# Patient Record
Sex: FEMALE | Race: WHITE | NOT HISPANIC OR LATINO | Employment: FULL TIME | URBAN - METROPOLITAN AREA
[De-identification: names, ages, dates, MRNs, and addresses within clinical notes are randomized per-mention and may not be internally consistent; named-entity substitution may affect disease eponyms.]

---

## 2017-04-12 ENCOUNTER — ALLSCRIPTS OFFICE VISIT (OUTPATIENT)
Dept: OTHER | Facility: OTHER | Age: 37
End: 2017-04-12

## 2017-04-12 DIAGNOSIS — Z20.828 CONTACT WITH AND (SUSPECTED) EXPOSURE TO OTHER VIRAL COMMUNICABLE DISEASES: ICD-10-CM

## 2017-04-13 ENCOUNTER — LAB CONVERSION - ENCOUNTER (OUTPATIENT)
Dept: OTHER | Facility: OTHER | Age: 37
End: 2017-04-13

## 2017-04-13 LAB
HEPATITIS B CORE TOTAL ANTIBODY (HISTORICAL): NORMAL
HEPATITIS B SURFACE ANTIGEN (HISTORICAL): NORMAL
HEPATITIS C ANTIBODY (HISTORICAL): NORMAL
HERPES SIMPLEX VIRUS 1 IGG (HISTORICAL): <0.9 INDEX
HERPES SIMPLEX VIRUS 2 IGG (HISTORICAL): <0.9 INDEX
HIV AG/AB, 4TH GEN (HISTORICAL): NORMAL
RPR SCREEN (HISTORICAL): NORMAL
SIGNAL TO CUT-OFF (HISTORICAL): 0.01

## 2017-04-17 LAB — CLINICAL COMMENT (HISTORICAL): NORMAL

## 2017-04-18 ENCOUNTER — GENERIC CONVERSION - ENCOUNTER (OUTPATIENT)
Dept: OTHER | Facility: OTHER | Age: 37
End: 2017-04-18

## 2017-05-01 LAB
ADDITIONAL INFORMATION (HISTORICAL): NORMAL
INTERPRETATION (HISTORICAL): NORMAL
INTERPRETATION (HISTORICAL): NORMAL
Lab: NEGATIVE
Lab: NORMAL

## 2018-01-13 VITALS
SYSTOLIC BLOOD PRESSURE: 114 MMHG | BODY MASS INDEX: 30.66 KG/M2 | WEIGHT: 184 LBS | HEIGHT: 65 IN | DIASTOLIC BLOOD PRESSURE: 76 MMHG

## 2018-01-15 NOTE — RESULT NOTES
Verified Results  (Q) THINPREP TIS PAP AND HR HPV DNA, C  TRACHOMATIS AND N  GONORRHOEAE [id number] 12:00AM Arlena Aschoff     Test Name Result Flag Reference   CLINICAL INFORMATION:      P3 FOR SCREENING   LMP:      [id number]   PREV  PAP:      6 YRS AGO WNL PER PT   PREV  BX:      NONE GIVEN   SOURCE:      Cervix, Endocervix   STATEMENT OF ADEQUACY:      Satisfactory for evaluation  Endocervical/transformation zone component  present  INTERPRETATION/RESULT:      Negative for intraepithelial lesion or malignancy  COMMENT:      This Pap test has been evaluated with computer  assisted technology  CYTOTECHNOLOGIST:      YAMILE SEAY(ASCP)  CT screening location: [address] [place] [address]   HPV mRNA E6/E7 Not Detected  Not Detected   This test was performed using the APTIMA HPV Assay ([employer] )  This assay detects E6/E7 viral messenger RNA (mRNA) from 14  high-risk HPV types (16,18,31,33,35,39,45,51,52,56,58,59,66,68)  CHLAMYDIA TRACHOMATIS$RNA, TMA NOT DETECTED  NOT DETECTED   NEISSERIA GONORRHOEAE$RNA, TMA NOT DETECTED  NOT DETECTED   This test was performed using the APTIMA COMBO2 Assay  ([employer] )  The analytical performance characteristics of this   assay, when used to test [facility] specimens have  been determined by [facility]

## 2018-10-25 ENCOUNTER — OFFICE VISIT (OUTPATIENT)
Dept: FAMILY MEDICINE CLINIC | Facility: CLINIC | Age: 38
End: 2018-10-25
Payer: COMMERCIAL

## 2018-10-25 ENCOUNTER — TELEPHONE (OUTPATIENT)
Dept: NEUROLOGY | Facility: CLINIC | Age: 38
End: 2018-10-25

## 2018-10-25 ENCOUNTER — APPOINTMENT (OUTPATIENT)
Dept: LAB | Facility: CLINIC | Age: 38
End: 2018-10-25
Payer: COMMERCIAL

## 2018-10-25 VITALS
HEIGHT: 66 IN | WEIGHT: 204.4 LBS | DIASTOLIC BLOOD PRESSURE: 62 MMHG | HEART RATE: 68 BPM | BODY MASS INDEX: 32.85 KG/M2 | SYSTOLIC BLOOD PRESSURE: 118 MMHG

## 2018-10-25 DIAGNOSIS — F41.9 ANXIETY AND DEPRESSION: ICD-10-CM

## 2018-10-25 DIAGNOSIS — E55.9 VITAMIN D DEFICIENCY: ICD-10-CM

## 2018-10-25 DIAGNOSIS — G43.011 INTRACTABLE MIGRAINE WITHOUT AURA AND WITH STATUS MIGRAINOSUS: Primary | ICD-10-CM

## 2018-10-25 DIAGNOSIS — F32.A ANXIETY AND DEPRESSION: ICD-10-CM

## 2018-10-25 PROBLEM — E58 DIETARY CALCIUM DEFICIENCY: Status: ACTIVE | Noted: 2017-04-12

## 2018-10-25 LAB
25(OH)D3 SERPL-MCNC: 18.3 NG/ML (ref 30–100)
MAGNESIUM SERPL-MCNC: 2.4 MG/DL (ref 1.6–2.6)

## 2018-10-25 PROCEDURE — 83735 ASSAY OF MAGNESIUM: CPT

## 2018-10-25 PROCEDURE — 82306 VITAMIN D 25 HYDROXY: CPT

## 2018-10-25 PROCEDURE — 36415 COLL VENOUS BLD VENIPUNCTURE: CPT

## 2018-10-25 PROCEDURE — 99204 OFFICE O/P NEW MOD 45 MIN: CPT | Performed by: PHYSICIAN ASSISTANT

## 2018-10-25 RX ORDER — DULOXETIN HYDROCHLORIDE 20 MG/1
40 CAPSULE, DELAYED RELEASE ORAL DAILY
COMMUNITY
Start: 2018-10-04 | End: 2019-07-03

## 2018-10-25 RX ORDER — LORAZEPAM 1 MG/1
TABLET ORAL
COMMUNITY
End: 2019-12-30

## 2018-10-25 RX ORDER — CHOLECALCIFEROL (VITAMIN D3) 125 MCG
CAPSULE ORAL
COMMUNITY

## 2018-10-25 RX ORDER — ONDANSETRON 4 MG/1
TABLET, ORALLY DISINTEGRATING ORAL
COMMUNITY
Start: 2018-10-20 | End: 2019-06-25

## 2018-10-25 RX ORDER — SUMATRIPTAN 50 MG/1
TABLET, FILM COATED ORAL
COMMUNITY
Start: 2018-10-20 | End: 2018-11-12

## 2018-10-25 NOTE — PATIENT INSTRUCTIONS
Problem List Items Addressed This Visit        Cardiovascular and Mediastinum    Intractable migraine without aura and with status migrainosus - Primary     Check CT head, refer to  neuro  Pt instructed to try 100 mg Imetrex at h/a onset instead of 50 and may repeat in 2 hours if needed  May be a candidate for Topamax for headache prevention  Relevant Medications    DULoxetine (CYMBALTA) 20 mg capsule    SUMAtriptan (IMITREX) 50 mg tablet    Other Relevant Orders    Ambulatory referral to Neurology    CT head wo contrast       Other    Anxiety and depression    Relevant Orders    Magnesium    Vitamin D deficiency     Check D level           Relevant Orders    Vitamin D 25 hydroxy

## 2018-10-25 NOTE — PROGRESS NOTES
Assessment/Plan:    Intractable migraine without aura and with status migrainosus  Check CT head, refer to  neuro  Pt instructed to try 100 mg Imetrex at h/a onset instead of 50 and may repeat in 2 hours if needed  May be a candidate for Topamax for headache prevention  Vitamin D deficiency  Check D level  Diagnoses and all orders for this visit:    Intractable migraine without aura and with status migrainosus  -     Ambulatory referral to Neurology; Future  -     CT head wo contrast; Future    Anxiety and depression  -     Magnesium; Future    Vitamin D deficiency  -     Vitamin D 25 hydroxy; Future    Other orders  -     LORazepam (ATIVAN) 1 mg tablet; Take by mouth  -     DULoxetine (CYMBALTA) 20 mg capsule; 40 mg daily  -     Magnesium Oxide -Mg Supplement 400 MG CAPS; Take by mouth  -     ondansetron (ZOFRAN-ODT) 4 mg disintegrating tablet;   -     SUMAtriptan (IMITREX) 50 mg tablet;   -     Cholecalciferol (VITAMIN D3) 2000 units TABS; Take by mouth          Subjective:     CC: Pt  Here to establish care with practice  C/o ongoing headaches and migraines  Pt  saw urgent care last week for migraine that went on for several days, Pt  Was told to f/u with a pcp and neurologist     Patient ID: Arely Hensley is a 44 y o  female  New patient to the practice here to get established  In the past 3 years has devolped headaches and migraines  In May was given Rx for imetrex 50 mg which were working fine  This past Tues developed typical migraine with pain of the R side of head with nausea and photophobia and sound sensitivity which lasted until the following Monday  Went to [facility] and was given Toradol and refill of Imitrex  Did not help much  Told she needs to see neuro  Never had imaging  No FH of headaches  Also getting h/a of some kind almost daily  No meds since Monday  Menses worse around menses  All getting worse since getting older  Has zofran for the nausea  Has a 15 y/o daughter   Lost a son age 10 y/o 10 years ago from neuroblastoma  Patient states that she often gets over anxiety about a usually benign physical finding and tends to use her provider for this to make sure that everything is okay  The following portions of the patient's history were reviewed and updated as appropriate: allergies, current medications, past family history, past medical history, past social history, past surgical history and problem list     Review of Systems   Constitutional: Negative  HENT: Negative  Eyes: Negative  Respiratory: Negative  Cardiovascular: Negative  Gastrointestinal: Positive for nausea  Endocrine: Negative  Genitourinary: Negative  Musculoskeletal: Negative  Skin: Negative  Allergic/Immunologic: Negative  Neurological: Positive for headaches  Hematological: Negative  Psychiatric/Behavioral: The patient is nervous/anxious  Objective:      Vitals:    10/25/18 1003   BP: 118/62   BP Location: Left arm   Patient Position: Sitting   Pulse: 68   Weight: 92 7 kg (204 lb 6 4 oz)   Height: 5' 6" (1 676 m)            Physical Exam   Constitutional: She is oriented to person, place, and time  She appears well-developed and well-nourished  No distress  HENT:   Head: Normocephalic and atraumatic  Eyes: Conjunctivae are normal  Right eye exhibits no discharge  Left eye exhibits no discharge  Neck: Neck supple  Carotid bruit is not present  Cardiovascular: Normal rate, regular rhythm and normal heart sounds  Exam reveals no gallop and no friction rub  No murmur heard  Pulmonary/Chest: Effort normal and breath sounds normal  No respiratory distress  She has no wheezes  She has no rales  Neurological: She is alert and oriented to person, place, and time  Skin: Skin is warm and dry  She is not diaphoretic  Psychiatric: She has a normal mood and affect  Judgment normal    Nursing note and vitals reviewed

## 2018-10-25 NOTE — ASSESSMENT & PLAN NOTE
Check CT head, refer to [facility]  Pt instructed to try 100 mg Imetrex at h/a onset instead of 50 and may repeat in 2 hours if needed  May be a candidate for Topamax for headache prevention

## 2018-10-28 DIAGNOSIS — E55.9 VITAMIN D DEFICIENCY: Primary | ICD-10-CM

## 2018-10-29 ENCOUNTER — HOSPITAL ENCOUNTER (OUTPATIENT)
Dept: CT IMAGING | Facility: HOSPITAL | Age: 38
Discharge: HOME | End: 2018-10-29
Payer: COMMERCIAL

## 2018-10-29 DIAGNOSIS — G43.011 INTRACTABLE MIGRAINE WITHOUT AURA AND WITH STATUS MIGRAINOSUS: ICD-10-CM

## 2018-10-29 PROCEDURE — 70450 CT HEAD/BRAIN W/O DYE: CPT

## 2018-11-12 ENCOUNTER — OFFICE VISIT (OUTPATIENT)
Dept: NEUROLOGY | Facility: CLINIC | Age: 38
End: 2018-11-12
Payer: COMMERCIAL

## 2018-11-12 VITALS
SYSTOLIC BLOOD PRESSURE: 100 MMHG | HEART RATE: 64 BPM | WEIGHT: 203 LBS | HEIGHT: 65 IN | BODY MASS INDEX: 33.82 KG/M2 | DIASTOLIC BLOOD PRESSURE: 80 MMHG

## 2018-11-12 DIAGNOSIS — R51.9 CHRONIC DAILY HEADACHE: ICD-10-CM

## 2018-11-12 DIAGNOSIS — E55.9 VITAMIN D DEFICIENCY: ICD-10-CM

## 2018-11-12 DIAGNOSIS — F32.A ANXIETY AND DEPRESSION: ICD-10-CM

## 2018-11-12 DIAGNOSIS — G43.709 CHRONIC MIGRAINE WITHOUT AURA WITHOUT STATUS MIGRAINOSUS, NOT INTRACTABLE: Primary | ICD-10-CM

## 2018-11-12 DIAGNOSIS — F41.9 ANXIETY AND DEPRESSION: ICD-10-CM

## 2018-11-12 PROCEDURE — 99244 OFF/OP CNSLTJ NEW/EST MOD 40: CPT | Performed by: PSYCHIATRY & NEUROLOGY

## 2018-11-12 RX ORDER — SUCRALFATE 1 G/1
TABLET ORAL
Qty: 9 TABLET | Refills: 0 | Status: SHIPPED | OUTPATIENT
Start: 2018-11-12 | End: 2018-12-03

## 2018-11-12 RX ORDER — TOPIRAMATE 25 MG/1
TABLET ORAL
Qty: 120 TABLET | Refills: 1 | Status: SHIPPED | OUTPATIENT
Start: 2018-11-12 | End: 2019-01-21 | Stop reason: SDUPTHER

## 2018-11-12 RX ORDER — RIZATRIPTAN BENZOATE 10 MG/1
10 TABLET ORAL ONCE AS NEEDED
Qty: 12 TABLET | Refills: 0 | Status: SHIPPED | OUTPATIENT
Start: 2018-11-12 | End: 2019-01-21 | Stop reason: SDUPTHER

## 2018-11-12 RX ORDER — INDOMETHACIN 50 MG/1
50 CAPSULE ORAL
Qty: 9 CAPSULE | Refills: 0 | Status: SHIPPED | OUTPATIENT
Start: 2018-11-12 | End: 2018-12-03

## 2018-11-12 NOTE — PATIENT INSTRUCTIONS
Diagnoses and all orders for this visit:  Chronic daily headache  Chronic migraine without aura without status migrainosus, not intractable    Additional Testing:   -    Labs:  Vitamin B12; TSH, Comprehensive metabolic panel; Future  -     MRI brain w wo contrast; Future    Headache/migraine treatment:   Abortive medications (for immediate treatment of a headache): It is ok to take ibuprofen, acetaminophen or naproxen (Advil, Tylenol,  Aleve, Excedrin) if they help your headaches you should limit these to No more than 3 times a week to avoid medication overuse/rebound headaches  To try and stop this current daily headache:   -     indomethacin (INDOCIN) 50 mg capsule; Take 1 capsule (50 mg total) by mouth 3 (three) times a day with meals for 3 days  -     sucralfate (CARAFATE) 1 g tablet; 1 tab PO TID 30 mins prior to the indomethacin to protect your stomach    For your more moderate to severe migraines take this medication early   Maxalt (rizatriptan) 10mg tabs - take one at the onset of headache  May repeat one time after 1-2 hours if pain has not resolved  Over the counter preventive supplements for headaches/migraines   (to take every day to help prevent headaches - not to take at the time of headache):  [x] Magnesium 400- 500mg daily - Does not have to be all at once    [x] Riboflavin (Vitamin B2)  400mg daily (adults)     (FYI B2 may make your urine bright/neon yellow)    Prescription preventive medications for headaches/migraines   (to take every day to help prevent headaches - not to take at the time of headache):  [x] topiramate (TOPAMAX) 25 mg tablet; 1 tab PO QHS for 1 week, increase as tolerated to 1 tab BID for 1 week, then 1 tab QAM and 2 tabs QHS for 1 week and finish at 2 tabs BID    *Typically these types of medications take time untill you see the benefit, although some may see improvement in days, often it may take weeks, especially if the medication is being titrated up to a beneficial level  Please contact us if there are any concerns or questions regarding the medication  Sleep:   [x] Melatonin - you may take 3 mg nightly for sleep  You should take this 1 hour prior to bedtime consistently every night for it to work  It works by gradually helping to adjust your sleep time over days to weeks, rather than immediately making you feel sleepy  Self-Monitoring:  [x] Headache calendar  Each day ray a number from 0-10 indicating if there was a headache and how bad it was  This can be used to monitor gradual improvement and is helpful to make medication adjustments  You can do this on paper or there is an WES for a smart phone called "Migraine e Diary"  Lifestyle Recommendations:  [x] SLEEP - Maintain a regular sleep schedule: Adults need at least 7-8 hours of uninterrupted a night  Maintain good sleep hygiene:  Going to bed and waking up at consistent times, avoiding excessive daytime naps, avoiding caffeinated beverages in the evening, avoid excessive stimulation in the evening and generally using bed primarily for sleeping  One hour before bedtime would recommend turning lights down lower, decreasing your activity (may read quietly, listen to music at a low volume)  When you get into bed, should eliminate all technology (no texting, emailing, playing with your phone, iPad or tablet in bed)  [x] HYDRATION - Maintain good hydration  Drink  2L of fluid a day (4 typical small water bottles)  [x] DIET - Maintain good nutrition  In particular don't skip meals and try and eat healthy balanced meals regularly  _Myfitnesspal is a great help for weight loss - try it for a week  [x] TRIGGERS - Look for other triggers and avoid them: Limit caffeine to 1-2 cups a day or less  Avoid dietary triggers that you have noticed bring on your headaches (this could include aged cheese, peanuts, MSG, aspartame and nitrates)  Education and Follow-up  [x] Please call with any questions or concerns     [x] 4-5 weeks, sometime after MRI Brain

## 2018-11-12 NOTE — PROGRESS NOTES
Patient ID: Marilynn Hardwick is a 44 y o  female  Assessment/Plan:    No problem-specific Assessment & Plan notes found for this encounter  {Assess/PlanSmartLinks:98423}       Subjective:    HPI    {[facility] HPI texts:68946}    {Common ambulatory SmartLinks:09236}         Objective:    Blood pressure 100/80, pulse 64, height 5' 5" (1 651 m), weight 92 1 kg (203 lb)  Physical Exam    Neurological Exam      ROS:    Review of Systems   Constitutional: Negative  HENT: Negative  Eyes: Negative  Respiratory: Negative  Gastrointestinal: Positive for nausea  Endocrine: Negative  Genitourinary: Negative  Allergic/Immunologic: Negative  Neurological: Positive for headaches (dull)  Psychiatric/Behavioral: The patient is nervous/anxious

## 2018-11-12 NOTE — LETTER
2018     Lucita Tesfaye Jonnathanruss [address]  [address] [employer]    Patient: Marc Gorman   YOB: 1980   Date of Visit: 2018       Dear Dr Carbajal Unafinance: Thank you for referring Marc Gorman to me for evaluation  Below are my notes for this consultation  In short, MRI brain and labs ordered for further evaluation  She was started on topiramate for headache prevention and trial of rizatriptan instead of Imitrex for her migraine abortive  Three days of indomethacin to see if we can abort this chronic daily headache  If you have questions, please do not hesitate to call me  I look forward to following your patient along with you  Sincerely,        Ariana Myrick MD        CC: No Recipients  Ariana Myrick MD  2018  4:53 PM  Sign at close encounter  [facility] Consult  PATIENT:  Marc Gorman  MRN:  [id number]  :  1980  DATE OF SERVICE:  2018  REFERRED BY: Alan Velázquez PA-C  PMD: Dell Redmond PA-C    Assessment/Plan:     Marc Gorman is a 44 y o  female referred here for evaluation of headache  She reports throughout her life she really only had headaches about once a month until 3 years ago at age 27 she started developing daily headaches and migraines  Over the past few months migraines have been more frequent prior to this there typically for a few days prior to menses now shows 69 migraine days a month  Otherwise chronic daily headache that is all day every day  Typically her headaches and migraines are right frontal/temporal with associated symptoms of nausea, phonophobia, Osmophobia, lightheadedness and problems with concentration, movement makes them worse  She has tried over-the-counter pain medications in the past however these did not work and she has not used these regularly in a while    Sumatriptan is worked a few times however most the time this makes her too sleepy to continue with her day   She reports she has never been on preventative for headache specifically  She has been on other antidepressants for history of anxiety depression as listed below  Neurologic assessment reveals normal neurological exam     We have discussed her chronic daily headaches and her chronic migraines without aura and we will work up further with MRI brain with and without contrast as well as laboratory workup low  Her primary care doctor is already replete her vitamin-D which was recently found to be low at 18 5  She already is taking magnesium 125 mg, we recommended she increase this to 400-500 mg a day for headache prevention along with vitamin B2 and melatonin which may help with sleep and headache prevention  We discussed headache hygiene and lifestyle factors which may include improve her headaches including hydration, exercise, diet and weight loss  We discussed starting an antidepressant which could help with her mood and headache prevention however she has been on multiple antidepressants in the past with side effects therefore we have decided to start topiramate with titration up to 50 mg b i d  For headache prevention  She will discuss this with her psychiatrist tomorrow to make sure there are no interactions with his plans  We will try rizatriptan as an abortive to see if this works better than amitriptyline for her  We also trial of indomethacin 50 mg t i d  For 3 days to see if we can abort this chronic daily headache        Other options to consider in the future for abortive include Toradol, steroid Dosepak, alternative Triptan,   For prevention could consider alternative medications such as amitriptyline, Depakote, propranolol, Botox, or other newer migraine injectables    Diagnoses and all orders for this visit:  Chronic daily headache  Chronic migraine without aura without status migrainosus, not intractable    Additional Testing:   -    Labs:  Vitamin B12; TSH, Comprehensive metabolic panel; Future  -     MRI brain w wo contrast; Future    Headache/migraine treatment:   Abortive medications (for immediate treatment of a headache): It is ok to take ibuprofen, acetaminophen or naproxen (Advil, Tylenol,  Aleve, Excedrin) if they help your headaches you should limit these to No more than 3 times a week to avoid medication overuse/rebound headaches  To try and stop this current daily headache:   -     indomethacin (INDOCIN) 50 mg capsule; Take 1 capsule (50 mg total) by mouth 3 (three) times a day with meals for 3 days  -     sucralfate (CARAFATE) 1 g tablet; 1 tab PO TID 30 mins prior to the indomethacin to protect your stomach    For your more moderate to severe migraines take this medication early   Maxalt (rizatriptan) 10mg tabs - take one at the onset of headache  May repeat one time after 1-2 hours if pain has not resolved  Over the counter preventive supplements for headaches/migraines   (to take every day to help prevent headaches - not to take at the time of headache):  [x] Magnesium 500mg daily   [x] Riboflavin (Vitamin B2) 200 mg (kids) to 400mg daily (adults)   (FYI B2 may make your urine bright/neon yellow)    Prescription preventive medications for headaches/migraines   (to take every day to help prevent headaches - not to take at the time of headache):  [x] topiramate (TOPAMAX) 25 mg tablet; 1 tab PO QHS for 1 week, increase as tolerated to 1 tab BID for 1 week, then 1 tab QAM and 2 tabs QHS for 1 week and finish at 2 tabs BID  This may help with:  [x] Headache prevention   []   *Typically these types of medications take time untill you see the benefit, although some may see improvement in days, often it may take weeks, especially if the medication is being titrated up to a beneficial level  Please contact us if there are any concerns or questions regarding the medication  Sleep:   [x] Melatonin - you may take 3 mg nightly for sleep   You should take this 1 hour prior to bedtime consistently every night for it to work  It works by gradually helping to adjust your sleep time over days to weeks, rather than immediately making you feel sleepy  Self-Monitoring:  [x] Headache calendar  Each day ray a number from 0-10 indicating if there was a headache and how bad it was  This can be used to monitor gradual improvement and is helpful to make medication adjustments  You can do this on paper or there is an WES for a smart phone called "Migraine e Diary"  Lifestyle Recommendations:  [x] SLEEP - Maintain a regular sleep schedule: Adults need at least 7-8 hours of uninterrupted a night  Maintain good sleep hygiene:  Going to bed and waking up at consistent times, avoiding excessive daytime naps, avoiding caffeinated beverages in the evening, avoid excessive stimulation in the evening and generally using bed primarily for sleeping  One hour before bedtime would recommend turning lights down lower, decreasing your activity (may read quietly, listen to music at a low volume)  When you get into bed, should eliminate all technology (no texting, emailing, playing with your phone, iPad or tablet in bed)  [x] HYDRATION - Maintain good hydration  Drink  2L of fluid a day (4 typical small water bottles)  [x] DIET - Maintain good nutrition  In particular don't skip meals and try and eat healthy balanced meals regularly  _Myfitnesspal is a great help for weight loss - try it for a week  [x] TRIGGERS - Look for other triggers and avoid them: Limit caffeine to 1-2 cups a day or less  Avoid dietary triggers that you have noticed bring on your headaches (this could include aged cheese, peanuts, MSG, aspartame and nitrates)  Education and Follow-up  [x] Please call with any questions or concerns  [x] 4-5 weeks, sometime after MRI Brain         CC:   We had the pleasure of evaluating Baifidencio Funk in neurological consultation today   she is a 44 y o    right handed female who presents today for evaluation of headaches  History of Present Illness:   What is your current pain level - 3    Headaches started at what age? Started regularly at 27years old  Before that once a month   Past 3 years daily headache and migraines  How often do the headaches occur? Chronic daily headaches, 6-9 migraine days a month  What time of the day do the headaches start? Typically wakes up with headache, migraines can happen any time of day   How long do the headaches last? Migraines 3-4 days a week   Are you ever headache free? No     Aura? without aura  Has a prodrome where she can tell it is going to be a migraine by the intensity of which it comes on     Describe your usual headache pain quality? dull, stabbing, throbbing and pressure - pounding the most  Where is your headache located? Usually on the right frontal/temporal, when starting to go away goes to left frontal - these are every day headaches  Migraines - usually just on the right frontal/temporal, sometimes right occipital     - for the past 3 years, was getting migraines the week before her period for two days of the week  rececently in the past several months - been getting them 3-4 days before period but also any time of the month    Does the pain Radiate? no radiation of pain  What is the intensity of pain?  Normal 5, migraines 7   Associated symptoms:   [x] Nausea       [] Vomiting        [] Diarrhea         [] Insomnia           [] Stiff or sore neck          [x] Problems with concentration  [] Photophobia     [x]Phonophobia      [x] Osmophobia  [] Blurred vision   [x] Prefer quiet, dark room        [x] Light-headed or dizzy    [] Tinnitus     [] Red ear      [] Ptosis      [] Facial droop  [] Lacrimation  [] Nasal congestion/rhinorrhea   [] Flushing of face         [] Change in pupil size  [] Hands or feet tingle or feel numb/paresthesias      Number of days missed per month because of headaches:  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 Movement - with migraine, coughing, sneezing, bending over,     Headache triggers:  stress, change in eating pattern, menses, weather changes   What time of the year do headaches occur more frequently?   do not seem to be related to any time of the year    Have you seen someone else for headaches or pain? No - pain doctor in the past for herniated disc in back - had cortisone injection and did not work, surgery helped   Have you had trigger point injection performed and how often? No  Have you had Botox injection performed and how often? No   Have you had epidural injections or transforaminal injections performed? No  Have you used CBD or THC for your headaches and how often? No  Are you current pregnant or planning on getting pregnant? No, husbands is fixed   Have you ever had any Brain imaging? yes [facility] normal     What medications do you take or have you taken for your headaches? ABORTIVE:   - OTC pain meds did not help - last used once last week   - sumatriptan 50 - prescribed in May - used for migraines - put her to sleep - filled 8 tabs once   - took it away completely, last episode did not     PREVENTIVE:   - no    Magnesium - 125 mg - will increase   repleting Vit D     Alternative therapies used in the past for headaches? no other headache interventions have been tried    Neck pain?: No    LIFESTYLE  Sleep - averages 7 hours    Is your sleep restful? Yes, 50/50  How often do you get up at night? Wakes up about once half the nights   Do you wake up with headaches? Yes  Do you snore while asleep? No  Have you been told that you stop breathing during sleeping? No  Do you wake up tired in the morning? Yes  Do you take frequent naps during the day?  Yes, used to every day, now cant because of work schedule     Physical activity: none -      Mood: stable  History of Bad anxiety and depression  - psychiatrist [facility] tomorrow   Takes:   Ativan 0 5 mg once in the am   cymbalta 40 mg daily - started about a year ago     In the past:  - lexapro, buspar for the longest - helped depression  prazosin the past for PTSD/nightmares  Vistaril 25 mg in past  Gabapentin in past   lyrica in past made her have SI   wellbutrin made her anxiety worse    The following portions of the patient's history were reviewed and updated as appropriate: allergies, current medications, past family history, past medical history, past social history, past surgical history and problem list     Past Medical History:     Past Medical History:   Diagnosis Date    Chickenpox        Patient Active Problem List   Diagnosis    Anxiety and depression    Dietary calcium deficiency    Lumbar herniated disc    Vitamin D deficiency    Intractable migraine without aura and with status migrainosus       Medications:      Current Outpatient Prescriptions   Medication Sig Dispense Refill    Cholecalciferol (VITAMIN D3) 2000 units TABS Take by mouth      DULoxetine (CYMBALTA) 20 mg capsule 40 mg daily      LORazepam (ATIVAN) 1 mg tablet Take by mouth      Magnesium Oxide -Mg Supplement 400 MG CAPS Take by mouth      ondansetron (ZOFRAN-ODT) 4 mg disintegrating tablet       SUMAtriptan (IMITREX) 50 mg tablet        No current facility-administered medications for this visit  Allergies:       Allergies   Allergen Reactions    Pregabalin      Other reaction(s): Suicidal ideation       Family History:   Family history of headaches:  no known family members with significant headaches  Any family history of aneurysms  No    21 years brother passed away and she was put on an antidepressant - first time    Father alive with hypertension  Mother alive and well  Paternal grandmother  in her 62s from breast cancer  Paternal grandfather  in his 47R  from complications of car accident    Maternal grandmother  in her 76s stroke  Maternal grandfather  in his 76s heart attacks next I and sibling 44 and well  Sibling 27 with alcoholism, former drug addict  Sibling  age 16 from a car accident    Child 13 with ADHD  Child  age 9 with neuroblastoma    Social History:   Work: Education:  Instructional school assistant  Education: Associates degree  Instructional school assistant  Lives with  marlon, son Brianda Galicia, daughter [de-identified]   13year old boy and 15year old daughter   Lost other son to cancer 10 years ago - neuroblastoma    Illicit Drugs: denies  Alcohol/tobacco: Denies alcohol use, Denies tobacco use      Objective:   Physical Exam:                                                                 Vitals:            Constitutional:    /80 (BP Location: Right arm, Patient Position: Sitting, Cuff Size: Large)   Pulse 64   Ht 5' 5" (1 651 m)   Wt 92 1 kg (203 lb)   BMI 33 78 kg/m²    BP Readings from Last 3 Encounters:   18 100/80   10/25/18 118/62   17 114/76     Pulse Readings from Last 3 Encounters:   18 64   10/25/18 68         Well developed, well nourished, well groomed  No dysmorphic features  HEENT:  Normocephalic atraumatic  No meningismus  Oropharynx is clear and moist  No oral mucosal lesions  Chest:  Respirations regular and unlabored  Cardiovascular:  Regular rate, intact distal pulses  Distal extremities warm without palpable edema or tenderness, no observed significant swelling  Musculoskeletal:  Full range of motion  (see below under neurologic exam for evaluation of motor function and gait)   Skin:  warm and dry, not diaphoretic  No apparent birthmarks or stigmata of neurocutaneous disease  Psychiatric:  Normal behavior and appropriate affect       Neurological Examination:     Mental status/cognitive function:   Orientated to time, place and person  Recent and remote memory intact  Attention span and concentration as well as fund of knowledge are appropriate for age  Normal language and spontaneous speech  Cranial Nerves:  II-visual fields full  Fundi appear normal bilaterally  III, IV, VI-Pupils were equal, round, and reactive to light and accomodation  Extraocular movements were full and conjugate without nystagmus  conjugate gaze, normal smooth pursuits, normal saccades   V-facial sensation symmetric  VII-facial expression symmetric, intact forehead wrinkle, strong eye closure, symmetric smile    VIII-hearing grossly intact bilaterally   IX, X-palate elevation symmetric, no dysarthria  XI-shoulder shrug strength intact    XII-tongue protrusion midline  Motor Exam: symmetric bulk and tone throughout, no pronator drift  Power/strength 5/5 bilateral upper and lower extremities, no atrophy, fasciculations or abnormal movements noted  Sensory: grossly intact light touch in all extremities  Reflexes: brachioradialis 2+, biceps 2+, knee 2+, ankle 2+ bilaterally  No ankle clonus  Coordination: Finger nose finger intact bilaterally, no apparent dysmetria, ataxia or tremor noted  Gait: steady casual and tandem gait  Romberg Negative  Pertinent lab results:   10/2018 Vit D 18 3 - replacement started     Imaging:   NCT 10/29/18:  Personally reviewed and unremarkable agree with radiology read of normal          Review of Systems:   ROS Personally reviewed  Review of Systems   Constitutional: Negative  HENT: Negative  Eyes: Negative  Respiratory: Negative  Gastrointestinal: Positive for nausea  Endocrine: Negative  Genitourinary: Negative  Allergic/Immunologic: Negative  Neurological: Positive for headaches (dull)     Psychiatric/Behavioral: The patient is nervous/anxious            I have spent 60 minutes with Patient  today in which greater than 50% of this time was spent in counseling/coordination of care regarding Diagnostic results, Prognosis, Risks and benefits of tx options, Intructions for management, Importance of tx compliance, Risk factor reductions and Impressions        Author:  Kristyn Ledesma MD 11/12/2018 3:22 PM

## 2018-11-12 NOTE — PROGRESS NOTES
[address] [facility] Consult  PATIENT:  Memo Dunlap  MRN:  [id number]  :  1980  DATE OF SERVICE:  2018  REFERRED BY: Pastor Vashti PA-C  PMD: Celeste Epley, PA-C    Assessment/Plan:     Memo Dunlap is a 44 y o  female referred here for evaluation of headache  She reports throughout her life she really only had headaches about once a month until 3 years ago at age 27 she started developing daily headaches and migraines  Over the past few months migraines have been more frequent prior to this there typically for a few days prior to menses now shows 6-9 migraine days a month  Otherwise chronic daily headache that is all day every day  Typically her headaches and migraines are right frontal/temporal with associated symptoms of nausea, phonophobia, Osmophobia, lightheadedness and problems with concentration, movement makes them worse  She has tried over-the-counter pain medications in the past however these did not work and she has not used these regularly in a while  Sumatriptan has worked a few times however most the time this makes her too sleepy to continue with her day  She reports she has never been on preventative for headache specifically  She has been on other antidepressants for history of anxiety depression as listed below  Neurologic assessment reveals normal neurological exam     We have discussed her chronic daily headaches and her chronic migraines without aura and we will work up further with MRI brain with and without contrast as well as laboratory workup low  Her primary care doctor is already repleting her vitamin-D which was recently found to be low at 18 5  She already is taking magnesium 125 mg, we recommended she increase this to 400-500 mg a day for headache prevention along with vitamin B2 and melatonin which may help with sleep and headache prevention    We discussed headache hygiene and lifestyle factors which may include improve her headaches including hydration, exercise, diet and weight loss  We discussed starting an antidepressant which could help with her mood and headache prevention however she has been on multiple antidepressants in the past with side effects therefore we have decided to start topiramate with titration up to 50 mg b i d  For headache prevention  She will discuss this with her psychiatrist tomorrow to make sure there are no interactions with his plans  We will try rizatriptan as an abortive to see if this works better than sumatriptan for her  We also trial of indomethacin 50 mg t i d  For 3 days to see if we can abort this chronic daily headache  Other options to consider in the future for abortive include Toradol, steroid Dosepak, alternative Triptan,   For prevention could consider alternative medications such as amitriptyline, Depakote, propranolol, Botox, or other newer migraine injectables    Diagnoses and all orders for this visit:  Chronic daily headache  Chronic migraine without aura without status migrainosus, not intractable    Additional Testing:   -    Labs:  Vitamin B12; TSH, Comprehensive metabolic panel; Future  -     MRI brain w wo contrast; Future    Headache/migraine treatment:   Abortive medications (for immediate treatment of a headache): It is ok to take ibuprofen, acetaminophen or naproxen (Advil, Tylenol,  Aleve, Excedrin) if they help your headaches you should limit these to No more than 3 times a week to avoid medication overuse/rebound headaches  To try and stop this current daily headache:   -     indomethacin (INDOCIN) 50 mg capsule; Take 1 capsule (50 mg total) by mouth 3 (three) times a day with meals for 3 days  -     sucralfate (CARAFATE) 1 g tablet; 1 tab PO TID 30 mins prior to the indomethacin to protect your stomach    For your more moderate to severe migraines take this medication early   Maxalt (rizatriptan) 10mg tabs - take one at the onset of headache   May repeat one time after 1-2 hours if pain has not resolved  Over the counter preventive supplements for headaches/migraines   (to take every day to help prevent headaches - not to take at the time of headache):  [x] Magnesium 500mg daily   [x] Riboflavin (Vitamin B2) 200 mg (kids) to 400mg daily (adults)   (FYI B2 may make your urine bright/neon yellow)    Prescription preventive medications for headaches/migraines   (to take every day to help prevent headaches - not to take at the time of headache):  [x] topiramate (TOPAMAX) 25 mg tablet; 1 tab PO QHS for 1 week, increase as tolerated to 1 tab BID for 1 week, then 1 tab QAM and 2 tabs QHS for 1 week and finish at 2 tabs BID  This may help with:  [x] Headache prevention   []   *Typically these types of medications take time untill you see the benefit, although some may see improvement in days, often it may take weeks, especially if the medication is being titrated up to a beneficial level  Please contact us if there are any concerns or questions regarding the medication  Sleep:   [x] Melatonin - you may take 3 mg nightly for sleep  You should take this 1 hour prior to bedtime consistently every night for it to work  It works by gradually helping to adjust your sleep time over days to weeks, rather than immediately making you feel sleepy  Self-Monitoring:  [x] Headache calendar  Each day ray a number from 0-10 indicating if there was a headache and how bad it was  This can be used to monitor gradual improvement and is helpful to make medication adjustments  You can do this on paper or there is an WES for a smart phone called "Migraine e Diary"  Lifestyle Recommendations:  [x] SLEEP - Maintain a regular sleep schedule: Adults need at least 7-8 hours of uninterrupted a night   Maintain good sleep hygiene:  Going to bed and waking up at consistent times, avoiding excessive daytime naps, avoiding caffeinated beverages in the evening, avoid excessive stimulation in the evening and generally using bed primarily for sleeping  One hour before bedtime would recommend turning lights down lower, decreasing your activity (may read quietly, listen to music at a low volume)  When you get into bed, should eliminate all technology (no texting, emailing, playing with your phone, iPad or tablet in bed)  [x] HYDRATION - Maintain good hydration  Drink  2L of fluid a day (4 typical small water bottles)  [x] DIET - Maintain good nutrition  In particular don't skip meals and try and eat healthy balanced meals regularly  _Myfitnesspal is a great help for weight loss - try it for a week  [x] TRIGGERS - Look for other triggers and avoid them: Limit caffeine to 1-2 cups a day or less  Avoid dietary triggers that you have noticed bring on your headaches (this could include aged cheese, peanuts, MSG, aspartame and nitrates)  Education and Follow-up  [x] Please call with any questions or concerns  [x] 4-5 weeks, sometime after MRI Brain         CC:   We had the pleasure of evaluating Jennine Favre in neurological consultation today  she is a 44 y o    right handed female who presents today for evaluation of headaches  History of Present Illness:   What is your current pain level - 3    Headaches started at what age? Started regularly at 27years old  Before that once a month   Past 3 years daily headache and migraines  How often do the headaches occur? Chronic daily headaches, 6-9 migraine days a month  What time of the day do the headaches start? Typically wakes up with headache, migraines can happen any time of day   How long do the headaches last? Migraines 3-4 days a week   Are you ever headache free? No     Aura? without aura  Has a prodrome where she can tell it is going to be a migraine by the intensity of which it comes on     Describe your usual headache pain quality? dull, stabbing, throbbing and pressure - pounding the most  Where is your headache located?  Usually on the right frontal/temporal, when starting to go away goes to left frontal - these are every day headaches  Migraines - usually just on the right frontal/temporal, sometimes right occipital     - for the past 3 years, was getting migraines the week before her period for two days of the week  rececently in the past several months - been getting them 3-4 days before period but also any time of the month    Does the pain Radiate? no radiation of pain  What is the intensity of pain? Normal 5, migraines 7   Associated symptoms:   [x] Nausea       [] Vomiting        [] Diarrhea         [] Insomnia           [] Stiff or sore neck          [x] Problems with concentration  [] Photophobia     [x]Phonophobia      [x] Osmophobia  [] Blurred vision   [x] Prefer quiet, dark room        [x] Light-headed or dizzy    [] Tinnitus     [] Red ear      [] Ptosis      [] Facial droop  [] Lacrimation  [] Nasal congestion/rhinorrhea   [] Flushing of face         [] Change in pupil size  [] Hands or feet tingle or feel numb/paresthesias      Number of days missed per month because of headaches:  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 Movement - with migraine, coughing, sneezing, bending over,     Headache triggers:  stress, change in eating pattern, menses, weather changes   What time of the year do headaches occur more frequently?   do not seem to be related to any time of the year    Have you seen someone else for headaches or pain? No - pain doctor in the past for herniated disc in back - had cortisone injection and did not work, surgery helped   Have you had trigger point injection performed and how often? No  Have you had Botox injection performed and how often? No   Have you had epidural injections or transforaminal injections performed? No  Have you used CBD or THC for your headaches and how often? No  Are you current pregnant or planning on getting pregnant?  No, husbands is fixed   Have you ever had any Brain imaging? yes [facility] normal     What medications do you take or have you taken for your headaches? ABORTIVE:   - OTC pain meds did not help - last used once last week   - sumatriptan 50 - prescribed in May - used for migraines - put her to sleep - filled 8 tabs once   - took it away completely, last episode did not     PREVENTIVE:   - no    Magnesium - 125 mg - will increase   repleting Vit D     Alternative therapies used in the past for headaches? no other headache interventions have been tried    Neck pain?: No    LIFESTYLE  Sleep - averages 7 hours    Is your sleep restful? Yes, 50/50  How often do you get up at night? Wakes up about once half the nights   Do you wake up with headaches? Yes  Do you snore while asleep? No  Have you been told that you stop breathing during sleeping? No  Do you wake up tired in the morning? Yes  Do you take frequent naps during the day?  Yes, used to every day, now cant because of work schedule     Physical activity: none -      Mood: stable  History of Bad anxiety and depression  - psychiatrist [facility] tomorrow   Takes:   Ativan 0 5 mg once in the am   cymbalta 40 mg daily - started about a year ago     In the past:  - lexapro, buspar for the longest - helped depression  prazosin the past for PTSD/nightmares  Vistaril 25 mg in past  Gabapentin in past   lyrica in past made her have SI   wellbutrin made her anxiety worse    The following portions of the patient's history were reviewed and updated as appropriate: allergies, current medications, past family history, past medical history, past social history, past surgical history and problem list     Past Medical History:     Past Medical History:   Diagnosis Date    Chickenpox        Patient Active Problem List   Diagnosis    Anxiety and depression    Dietary calcium deficiency    Lumbar herniated disc    Vitamin D deficiency    Intractable migraine without aura and with status migrainosus Medications:      Current Outpatient Prescriptions   Medication Sig Dispense Refill    Cholecalciferol (VITAMIN D3) 2000 units TABS Take by mouth      DULoxetine (CYMBALTA) 20 mg capsule 40 mg daily      LORazepam (ATIVAN) 1 mg tablet Take by mouth      Magnesium Oxide -Mg Supplement 400 MG CAPS Take by mouth      ondansetron (ZOFRAN-ODT) 4 mg disintegrating tablet       SUMAtriptan (IMITREX) 50 mg tablet        No current facility-administered medications for this visit  Allergies: Allergies   Allergen Reactions    Pregabalin      Other reaction(s): Suicidal ideation       Family History:   Family history of headaches:  no known family members with significant headaches  Any family history of aneurysms  No    21 years brother passed away and she was put on an antidepressant - first time    Father alive with hypertension  Mother alive and well  Paternal grandmother  in her 62s from breast cancer  Paternal grandfather  in his 84C  from complications of car accident    Maternal grandmother  in her 76s stroke  Maternal grandfather  in his 76s heart attacks next I and sibling 44 and well  Sibling 27 with alcoholism, former drug addict  Sibling  age 16 from a car accident    Child 13 with ADHD  Child  age 9 with neuroblastoma    Social History:   Work: Education:  Instructional school assistant  Education: Associates degree   Instructional school assistant  Lives with  marlon, son Rossana Zuniga, daughter [de-identified]   13year old boy and 15year old daughter   Lost other son to cancer 10 years ago - neuroblastoma    Illicit Drugs: denies  Alcohol/tobacco: Denies alcohol use, Denies tobacco use      Objective:   Physical Exam:                                                                 Vitals:            Constitutional:    /80 (BP Location: Right arm, Patient Position: Sitting, Cuff Size: Large)   Pulse 64   Ht 5' 5" (1 651 m)   Wt 92 1 kg (203 lb) BMI 33 78 kg/m²   BP Readings from Last 3 Encounters:   11/12/18 100/80   10/25/18 118/62   04/12/17 114/76     Pulse Readings from Last 3 Encounters:   11/12/18 64   10/25/18 68         Well developed, well nourished, well groomed  No dysmorphic features  HEENT:  Normocephalic atraumatic  No meningismus  Oropharynx is clear and moist  No oral mucosal lesions  Chest:  Respirations regular and unlabored  Cardiovascular:  Regular rate, intact distal pulses  Distal extremities warm without palpable edema or tenderness, no observed significant swelling  Musculoskeletal:  Full range of motion  (see below under neurologic exam for evaluation of motor function and gait)   Skin:  warm and dry, not diaphoretic  No apparent birthmarks or stigmata of neurocutaneous disease  Psychiatric:  Normal behavior and appropriate affect       Neurological Examination:     Mental status/cognitive function:   Orientated to time, place and person  Recent and remote memory intact  Attention span and concentration as well as fund of knowledge are appropriate for age  Normal language and spontaneous speech  Cranial Nerves:  II-visual fields full  Fundi appear normal bilaterally  III, IV, VI-Pupils were equal, round, and reactive to light and accomodation  Extraocular movements were full and conjugate without nystagmus  conjugate gaze, normal smooth pursuits, normal saccades   V-facial sensation symmetric  VII-facial expression symmetric, intact forehead wrinkle, strong eye closure, symmetric smile    VIII-hearing grossly intact bilaterally   IX, X-palate elevation symmetric, no dysarthria  XI-shoulder shrug strength intact    XII-tongue protrusion midline  Motor Exam: symmetric bulk and tone throughout, no pronator drift  Power/strength 5/5 bilateral upper and lower extremities, no atrophy, fasciculations or abnormal movements noted  Sensory: grossly intact light touch in all extremities     Reflexes: brachioradialis 2+, biceps 2+, knee 2+, ankle 2+ bilaterally  No ankle clonus  Coordination: Finger nose finger intact bilaterally, no apparent dysmetria, ataxia or tremor noted  Gait: steady casual and tandem gait  Romberg Negative  Pertinent lab results:   10/2018 Vit D 18 3 - replacement started     Imaging:   [facility] 10/29/18:  Personally reviewed and unremarkable agree with radiology read of normal          Review of Systems:   ROS Personally reviewed  Review of Systems   Constitutional: Negative  HENT: Negative  Eyes: Negative  Respiratory: Negative  Gastrointestinal: Positive for nausea  Endocrine: Negative  Genitourinary: Negative  Allergic/Immunologic: Negative  Neurological: Positive for headaches (dull)  Psychiatric/Behavioral: The patient is nervous/anxious            I have spent 60 minutes with Patient  today in which greater than 50% of this time was spent in counseling/coordination of care regarding Diagnostic results, Prognosis, Risks and benefits of tx options, Intructions for management, Importance of tx compliance, Risk factor reductions and Impressions        Author:  Joan Lua MD 11/12/2018 3:22 PM

## 2018-11-13 ENCOUNTER — TRANSCRIBE ORDERS (OUTPATIENT)
Dept: LAB | Facility: CLINIC | Age: 38
End: 2018-11-13

## 2018-11-13 ENCOUNTER — APPOINTMENT (OUTPATIENT)
Dept: LAB | Facility: CLINIC | Age: 38
End: 2018-11-13
Payer: COMMERCIAL

## 2018-11-13 DIAGNOSIS — G43.709 CHRONIC MIGRAINE WITHOUT AURA WITHOUT STATUS MIGRAINOSUS, NOT INTRACTABLE: ICD-10-CM

## 2018-11-13 DIAGNOSIS — F41.9 ANXIETY AND DEPRESSION: ICD-10-CM

## 2018-11-13 DIAGNOSIS — F32.A ANXIETY AND DEPRESSION: ICD-10-CM

## 2018-11-13 LAB
ALBUMIN SERPL BCP-MCNC: 3.8 G/DL (ref 3.5–5)
ALP SERPL-CCNC: 55 U/L (ref 46–116)
ALT SERPL W P-5'-P-CCNC: 19 U/L (ref 12–78)
ANION GAP SERPL CALCULATED.3IONS-SCNC: 6 MMOL/L (ref 4–13)
AST SERPL W P-5'-P-CCNC: 12 U/L (ref 5–45)
BILIRUB SERPL-MCNC: 0.37 MG/DL (ref 0.2–1)
BUN SERPL-MCNC: 11 MG/DL (ref 5–25)
CALCIUM SERPL-MCNC: 8.8 MG/DL (ref 8.3–10.1)
CHLORIDE SERPL-SCNC: 105 MMOL/L (ref 100–108)
CO2 SERPL-SCNC: 28 MMOL/L (ref 21–32)
CREAT SERPL-MCNC: 0.7 MG/DL (ref 0.6–1.3)
GFR SERPL CREATININE-BSD FRML MDRD: 110 ML/MIN/1.73SQ M
GLUCOSE P FAST SERPL-MCNC: 90 MG/DL (ref 65–99)
POTASSIUM SERPL-SCNC: 4.2 MMOL/L (ref 3.5–5.3)
PROT SERPL-MCNC: 6.9 G/DL (ref 6.4–8.2)
SODIUM SERPL-SCNC: 139 MMOL/L (ref 136–145)
TSH SERPL DL<=0.05 MIU/L-ACNC: 0.95 UIU/ML (ref 0.36–3.74)
VIT B12 SERPL-MCNC: 393 PG/ML (ref 100–900)

## 2018-11-13 PROCEDURE — 36415 COLL VENOUS BLD VENIPUNCTURE: CPT

## 2018-11-13 PROCEDURE — 84443 ASSAY THYROID STIM HORMONE: CPT

## 2018-11-13 PROCEDURE — 80053 COMPREHEN METABOLIC PANEL: CPT

## 2018-11-13 PROCEDURE — 82607 VITAMIN B-12: CPT

## 2018-11-14 DIAGNOSIS — G43.709 CHRONIC MIGRAINE WITHOUT AURA WITHOUT STATUS MIGRAINOSUS, NOT INTRACTABLE: Primary | ICD-10-CM

## 2018-11-19 ENCOUNTER — TELEPHONE (OUTPATIENT)
Dept: NEUROLOGY | Facility: CLINIC | Age: 38
End: 2018-11-19

## 2018-11-19 NOTE — TELEPHONE ENCOUNTER
Pt called in for lab results I made her aware of results, she was questioning if you felt her labs (sodium specifically) indicated why she may be having headaches? She was made aware Na wnl, but she still felt maybe because it was on the lower end of normal that this could indicate something  Please advise

## 2018-11-24 ENCOUNTER — HOSPITAL ENCOUNTER (OUTPATIENT)
Dept: MRI IMAGING | Facility: HOSPITAL | Age: 38
Discharge: HOME | End: 2018-11-24
Attending: PSYCHIATRY & NEUROLOGY
Payer: COMMERCIAL

## 2018-11-24 DIAGNOSIS — G43.709 CHRONIC MIGRAINE WITHOUT AURA WITHOUT STATUS MIGRAINOSUS, NOT INTRACTABLE: ICD-10-CM

## 2018-11-24 PROCEDURE — 70553 MRI BRAIN STEM W/O & W/DYE: CPT

## 2018-11-24 PROCEDURE — A9585 GADOBUTROL INJECTION: HCPCS | Performed by: PSYCHIATRY & NEUROLOGY

## 2018-11-24 RX ADMIN — GADOBUTROL 9 ML: 604.72 INJECTION INTRAVENOUS at 09:15

## 2018-12-03 ENCOUNTER — TELEPHONE (OUTPATIENT)
Dept: NEUROLOGY | Facility: CLINIC | Age: 38
End: 2018-12-03

## 2018-12-03 DIAGNOSIS — G43.011 INTRACTABLE MIGRAINE WITHOUT AURA AND WITH STATUS MIGRAINOSUS: Primary | ICD-10-CM

## 2018-12-03 RX ORDER — DEXAMETHASONE 1 MG
1 TABLET ORAL
Qty: 3 TABLET | Refills: 0 | Status: SHIPPED | OUTPATIENT
Start: 2018-12-03 | End: 2018-12-06

## 2018-12-03 NOTE — TELEPHONE ENCOUNTER
I recommend Dexamethasone 1 mg in am with breakfast for 3 days to try and abort this migraine  Prescription sent in  Please discuss the main possible side effects

## 2018-12-03 NOTE — TELEPHONE ENCOUNTER
pt called and states that she has had a migraine for the last 3 days and feels that rizatriptan is ineffective  she states that rizatriptan does take it away for about 10 hrs but then comes back  she states that it also makes her drowsy so she is not able to take during the day while she is working  she does   feels topamax  is helping her everyday headaches but feels migraines are getting worse and more frequent  currently 8/10   +nausea/sound sensitivity  has never taken depakote or decadron in the past     mag 800mg daily  b2 1 tab daily  topiramate 25mg 2 tabs bid-starting today  indomethacin was effective that you prescribed at last office visit   took rizatriptan once for the last 3 days  i did review max dose of 3 times per week and over use headaches     [phone]

## 2018-12-04 NOTE — TELEPHONE ENCOUNTER
Pt did  script yesterday, started last night  Feels so much better  Will finish out script, she is not 100% better yet  thanks

## 2018-12-16 NOTE — PROGRESS NOTES
[facility] Consult - Follow up   PATIENT:  Melecio Johnston  MRN:  [id number]  :  1980  DATE OF SERVICE:  2018  REFERRED BY: No ref  provider found  PMD: Hansel Bustillos PA-C    Assessment/Plan:    Melecio Johnston is a 43 y o  female referred here for evaluation of headache  She reports throughout her life she really only had headaches about once a month until 3 years ago at age 27 she started developing daily headaches and migraines  Chronic daily headache  Chronic migraine without aura without status migrainosus, not intractable  - Over the past few months migraines have been more frequent prior to this there typically for a few days prior to menses now has 6-9 migraine days a month  Eight migraines in the past 5 weeks    - chronic daily headaches have now been cut in half  - Typically her headaches and migraines are right frontal/temporal with associated symptoms of nausea, phonophobia, Osmophobia, lightheadedness and problems with concentration, movement makes them worse  Preventative:  - continue magnesium 400 mg a day, vitamin B2 400 daily and melatonin   - We discussed headache hygiene and lifestyle factors which may include improve her headaches including hydration, exercise, diet and weight loss  - We discussed starting an antidepressant which could help with her mood and headache prevention however she has been on multiple antidepressants in the past with side effects   - started topiramate 50 mg b i d  With improvement in chronic daily headaches    Abortive:  - trial of rizatriptan 10 mg - too sleepy to work, helped headache though  - indomethacin 50 mg t i d  For 3 days - she thinks this knocked out/decreased in half daily headaches   - dexamethasone 1 mg for three days that helped get rid of a migraine, could use again in future  - Sumatriptan has worked a few times however most the time this makes her too sleepy to continue with her day       - She has tried over-the-counter pain medications in the past however these did not work and she has not used these regularly in a while  Other options to consider in the future for abortive include Toradol, alternative Triptan,   For prevention could consider alternative medications such as amitriptyline, Depakote, propranolol, Botox, or other newer migraine injectables       Vitamin-D deficiency  - Her primary care doctor is already repleting her vitamin-D which was recently found to be low at 18  5              Patient instructions:    Migraine without aura and without status migrainosus, not intractable  Headache/migraine treatment:   Abortive medications (for immediate treatment of a headache): It is ok to take ibuprofen, acetaminophen or naproxen (Advil, Tylenol,  Aleve, Excedrin) if they help your headaches you should limit these to No more than 3 times a week to avoid medication overuse/rebound headaches  OK to take this as needed for the more moderate to severe migraines, do not take it with any ibuprofen or naproxen as the combination would be hard on your stomach  -     ketorolac (TORADOL) 10 mg tablet; Take 1 tablet (10 mg total) by mouth daily as needed for severe pain Max 1/day, 3/week, 10/month  Do not use with other OTC pain meds    For your more moderate to severe migraines take this medication early   - Maxalt (rizatriptan) 10mg tabs - take one at the onset of headache   May repeat one time after 1-2 hours if pain has not resolved      Over the counter preventive supplements for headaches/migraines   (to take every day to help prevent headaches - not to take at the time of headache):  [x] Magnesium 400mg daily   [x] Riboflavin (Vitamin B2)  400mg daily   (FYI B2 may make your urine bright/neon yellow)     Prescription preventive medications for headaches/migraines   (to take every day to help prevent headaches - not to take at the time of headache):  [x] topiramate (TOPAMAX) continue 50 mg twice a day  This may help with:  [x] Headache prevention     *Typically these types of medications take time untill you see the benefit, although some may see improvement in days, often it may take weeks, especially if the medication is being titrated up to a beneficial level  Please contact us if there are any concerns or questions regarding the medication       Sleep/headache prevention:   [x] Melatonin - you may take 3 mg nightly for sleep  You should take this 1 hour prior to bedtime consistently every night for it to work  It works by gradually helping to adjust your sleep time over days to weeks, rather than immediately making you feel sleepy        Self-Monitoring:  [x] Headache calendar  Each day ray a number from 0-10 indicating if there was a headache and how bad it was  This can be used to monitor gradual improvement and is helpful to make medication adjustments  You can do this on paper or there is an WES for a smart phone called "Migraine e Diary"       Lifestyle Recommendations:  [x] SLEEP - Maintain a regular sleep schedule: Adults need at least 7-8 hours of uninterrupted a night  Maintain good sleep hygiene:  Going to bed and waking up at consistent times, avoiding excessive daytime naps, avoiding caffeinated beverages in the evening, avoid excessive stimulation in the evening and generally using bed primarily for sleeping  One hour before bedtime would recommend turning lights down lower, decreasing your activity (may read quietly, listen to music at a low volume)  When you get into bed, should eliminate all technology (no texting, emailing, playing with your phone, iPad or tablet in bed)  [x] HYDRATION - Maintain good hydration  Drink  2L of fluid a day (4 typical small water bottles)  [x] DIET - Maintain good nutrition  In particular don't skip meals and try and eat healthy balanced meals regularly    _Myfitnesspal is a great help for weight loss - try it for a week  [x] TRIGGERS - Look for other triggers and avoid them: Limit caffeine to 1-2 cups a day or less  Avoid dietary triggers that you have noticed bring on your headaches (this could include aged cheese, peanuts, MSG, aspartame and nitrates)      Education and Follow-up  [x] Please call with any questions or concerns  [x] Follow up 3 months             CC: We had the pleasure of evaluating Chqiui Alfaro in neurological consultation today  she is a 43 y o    right handed female who presents today for evaluation of headaches       History of Present Illness:   Interval events:   - has decreased daily OTC pain meds use, to once or twice in past 5 weeks   - topiramate has cut daily headaches in half  12/3/18: migraine treated with dexamethasone 1 mg daily for 3 days  - bad news yesterday, they get insurance through the state, topiramate will not be covered     What is your current pain level - 2      Headaches started at what age? Started regularly at 27years old  Before that once a month   Past 3 years daily headache and migraines  How often do the headaches occur? Chronic daily headaches - have been cut in half  6-9 migraine days a month - in past 5 weeks has had 8 (11/20, 26, 12/1,2,3,4,15,16)  What time of the day do the headaches start? Typically wakes up with headache, migraines can happen any time of day   How long do the headaches last?   Migraines 3-4 days a week   Are you ever headache free? No     Aura?  without aura    Has a prodrome where she can tell it is going to be a migraine by the intensity of which it comes on      Describe your usual headache pain quality?   dull, stabbing, throbbing and pressure - pounding the most  Where is your headache located?   Usually on the right frontal/temporal, when starting to go away goes to left frontal - these are every day headaches    Migraines - usually just on the right frontal/temporal, sometimes right occipital      - for the past 3 years, was getting migraines the week before her period for two days of the week  rececently in the past several months - been getting them 3-4 days before period but also any time of the month     Does the pain Radiate?  no radiation of pain  What is the intensity of pain? Normal 5, migraines 7   Associated symptoms:   [x] Nausea       [] Vomiting        [] Diarrhea         [] Insomnia           [] Stiff or sore neck          [x] Problems with concentration  [] Photophobia      [x]Phonophobia      [x] Osmophobia  [] Blurred vision   [x] Prefer quiet, dark room        [x] Light-headed or dizzy    [] Tinnitus      [] Red ear      [] Ptosis      [] Facial droop  [] Lacrimation  [] Nasal congestion/rhinorrhea   [] Flushing of face         [] Change in pupil size  [] Hands or feet tingle or feel numb/paresthesias       Number of days missed per month because of headaches:  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 Movement - with migraine, coughing, sneezing, bending over,      Headache triggers:   stress, change in eating pattern, menses, weather changes   What time of the year do headaches occur more frequently?   do not seem to be related to any time of the year      Have you seen someone else for headaches or pain? No - pain doctor in the past for herniated disc in back - had cortisone injection and did not work, surgery helped   Have you had trigger point injection performed and how often? No  Have you had Botox injection performed and how often? No   Have you had epidural injections or transforaminal injections performed? No  Have you used CBD or THC for your headaches and how often? No  Are you current pregnant or planning on getting pregnant? No, husbands is fixed   Have you ever had any Brain imaging? Yes, MRI Brain      What medications do you take or have you taken for your headaches?    ABORTIVE:   - OTC pain meds did not help - last used once last week     - rizatriptan too tired   - toradol started 12/17/18      Decadron 1 mg for 3 days helped migraine  Indomethacin 50 mg t i d  For 3 days decreased chronic daily headache     PREVENTIVE:   - started topiramate      Mg, b2  repleting Vit D     Past meds:  - amitriptyline - does not remember clearly, went off when pregnant   - Gabapentin in past  - increased anxiety      Alternative therapies used in the past for headaches? no other headache interventions have been tried     Neck pain?: No     LIFESTYLE  Sleep - averages 7-8 hours     Is your sleep restful? Yes, 50/50  How often do you get up at night? Wakes up about once half the nights   Do you wake up with headaches? Yes  Do you snore while asleep? No  Have you been told that you stop breathing during sleeping? No  Do you wake up tired in the morning? Yes  Do you take frequent naps during the day?  Yes, used to every day, now cant because of work schedule      Physical activity: none -    Water: 100 ounzes        Mood: stable  History of Bad anxiety and depression  - following with psychiatrist   Takes:   Ativan 0 5 mg once in the am   cymbalta 40 mg daily - started about a year ago, weaning down to 30 mg      In the past:  - lexapro, buspar for the longest - helped depression  prazosin the past for PTSD/nightmares  Vistaril 25 mg in past  Gabapentin in past   lyrica in past made her have SI   wellbutrin made her anxiety worse     The following portions of the patient's history were reviewed and updated as appropriate: allergies, current medications, past family history, past medical history, past social history, past surgical history and problem list                Past Medical History:     Past Medical History:   Diagnosis Date    Chickenpox        Patient Active Problem List   Diagnosis    Anxiety and depression    Dietary calcium deficiency    Lumbar herniated disc    Vitamin D deficiency    Intractable migraine without aura and with status migrainosus    Chronic daily headache    Chronic migraine without aura without status migrainosus, not intractable       Medications:      Current Outpatient Prescriptions   Medication Sig Dispense Refill    Cholecalciferol (VITAMIN D3) 2000 units TABS Take by mouth      DULoxetine (CYMBALTA) 20 mg capsule 40 mg daily      LORazepam (ATIVAN) 1 mg tablet Take by mouth      Magnesium Oxide -Mg Supplement 400 MG CAPS Take by mouth      Melatonin 3 MG CAPS Take 3 mg by mouth      ondansetron (ZOFRAN-ODT) 4 mg disintegrating tablet       Riboflavin (B2 PO) Take by mouth      rizatriptan (MAXALT) 10 MG tablet Take 1 tablet (10 mg total) by mouth once as needed for migraine for up to 1 dose May repeat in 2 hours if needed 12 tablet 0    topiramate (TOPAMAX) 25 mg tablet 1 tab PO QHS for 1 week, increase as tolerated to 1 tab BID for 1 week, then 1 tab QAM and 2 tabs QHS for 1 week and finish at 2 tabs BID  120 tablet 1     No current facility-administered medications for this visit  Allergies: Allergies   Allergen Reactions    Pregabalin      Other reaction(s): Suicidal ideation       Family History:     Family History   Problem Relation Age of Onset    Hypertension Father     Breast cancer Maternal Grandmother     Stroke Maternal Grandmother     Cancer Maternal Grandfather         with unknown primary sit     Breast cancer Paternal Grandmother     Other Son         Neuroblastoma of abdomen    Breast cancer Maternal Aunt     Ovarian cancer Maternal Aunt         at age 28    Breast cancer Paternal Aunt         dx at age 52       Social History:   Work: Education:  Instructional school assistant  Education: Associates degree   Instructional school assistant  Lives with  marlon, son Princess Carter, daughter [de-identified]   13year old boy and 15year old daughter   Lost other son to cancer 10 years ago - neuroblastoma    Social History     Social History    Marital status: /Civil Union     Spouse name: N/A    Number of children: N/A    Years of education: associates degree     Occupational History    Patent processor      Social History Main Topics    Smoking status: Never Smoker    Smokeless tobacco: Never Used    Alcohol use No    Drug use: No    Sexual activity: Not on file     Other Topics Concern    Not on file     Social History Narrative    Lack of exercise     Rastafari affiliation - non Religion Latter day    Hobbies: reading, bike rides, volunteering    Employed:              Objective:   Physical Exam:                                                                   Vitals:            Constitutional:    /72 (BP Location: Right arm, Patient Position: Sitting, Cuff Size: Large)   Pulse 73   Ht 5' 4" (1 626 m)   Wt 90 7 kg (200 lb)   BMI 34 33 kg/m²   BP Readings from Last 3 Encounters:   12/17/18 104/72   11/12/18 100/80   10/25/18 118/62     Pulse Readings from Last 3 Encounters:   12/17/18 73   11/12/18 64   10/25/18 68         Well developed, well nourished, well groomed  No dysmorphic features  HEENT:  Normocephalic atraumatic  No meningismus  Oropharynx is clear and moist  No oral mucosal lesions  Chest:  Respirations regular and unlabored  Cardiovascular:  Regular rate, intact distal pulses  Distal extremities warm without palpable edema or tenderness, no observed significant swelling  Musculoskeletal:  Full range of motion  (see below under neurologic exam for evaluation of motor function and gait)   Skin:  warm and dry, not diaphoretic  No apparent birthmarks or stigmata of neurocutaneous disease  Psychiatric:  Normal behavior and appropriate affect        Neurological Examination:     Mental status/cognitive function:   Orientated to time, place and person  Recent and remote memory intact  Attention span and concentration as well as fund of knowledge are appropriate for age  Normal language and spontaneous speech    Cranial Nerves:  III, IV, VI-Pupils were equal, round, and reactive to light  Extraocular movements were full and conjugate without nystagmus  conjugate gaze, normal smooth pursuits, normal saccades   VII-facial expression symmetric, intact forehead wrinkle, strong eye closure, symmetric smile    VIII-hearing grossly intact bilaterally   Motor Exam: symmetric bulk throughout  no atrophy, fasciculations or abnormal movements noted  Coordination:  no apparent dysmetria, ataxia or tremor noted  Gait: steady casual gait     Pertinent lab results:   10/2018 Vit D 18 3 - replacement started    11/13/2018:  CMP within normal limits  B12 393  TSH 0 954     Imaging:   [facility] 10/29/18:  Personally reviewed and unremarkable agree with radiology read of normal      MRI Brain with and without contrast 11/24/2018  Personally reviewed and showed to patient    Several small white matter hyperintensities are seen on T2 and FLAIR imaging within the frontal lobes without mass effect, diffusion abnormality or abnormal enhancement  These are nonspecific in a patient of this age and white matter lesions have been described within the frontal lobes in patients with chronic migraine headaches  Review of Systems:   ZEUS Personally reviewed         Review of Systems   Constitutional: Negative  HENT: Negative  Eyes: Negative  Respiratory: Negative  Cardiovascular: Negative  Gastrointestinal: Negative  Endocrine: Negative  Genitourinary: Negative  Musculoskeletal: Negative  Skin: Negative  Allergic/Immunologic: Negative  Neurological: Positive for headaches (not as often)  Hematological: Negative  Psychiatric/Behavioral: Negative  I have spent 45 minutes with Patient  today in which greater than 50% of this time was spent in counseling/coordination of care regarding Diagnostic results, Prognosis, Risks and benefits of tx options, Intructions for management, Importance of tx compliance, Risk factor reductions and Impressions        Author: Lesli Ortiz MD 12/17/2018 4:12 PM

## 2018-12-17 ENCOUNTER — OFFICE VISIT (OUTPATIENT)
Dept: NEUROLOGY | Facility: CLINIC | Age: 38
End: 2018-12-17
Payer: COMMERCIAL

## 2018-12-17 VITALS
WEIGHT: 200 LBS | HEART RATE: 73 BPM | SYSTOLIC BLOOD PRESSURE: 104 MMHG | DIASTOLIC BLOOD PRESSURE: 72 MMHG | BODY MASS INDEX: 34.15 KG/M2 | HEIGHT: 64 IN

## 2018-12-17 DIAGNOSIS — F32.A ANXIETY AND DEPRESSION: ICD-10-CM

## 2018-12-17 DIAGNOSIS — G43.009 MIGRAINE WITHOUT AURA AND WITHOUT STATUS MIGRAINOSUS, NOT INTRACTABLE: Primary | ICD-10-CM

## 2018-12-17 DIAGNOSIS — R51.9 CHRONIC DAILY HEADACHE: ICD-10-CM

## 2018-12-17 DIAGNOSIS — E55.9 VITAMIN D DEFICIENCY: ICD-10-CM

## 2018-12-17 DIAGNOSIS — F41.9 ANXIETY AND DEPRESSION: ICD-10-CM

## 2018-12-17 PROCEDURE — 99215 OFFICE O/P EST HI 40 MIN: CPT | Performed by: PSYCHIATRY & NEUROLOGY

## 2018-12-17 RX ORDER — KETOROLAC TROMETHAMINE 10 MG/1
10 TABLET, FILM COATED ORAL DAILY PRN
Qty: 10 TABLET | Refills: 0 | Status: SHIPPED | OUTPATIENT
Start: 2018-12-17 | End: 2019-01-21 | Stop reason: SDUPTHER

## 2018-12-17 NOTE — PROGRESS NOTES
Patient ID: Wendy Noe is a 43 y o  female  Assessment/Plan:    No problem-specific Assessment & Plan notes found for this encounter  {Assess/PlanSmartLinks:51057}       Subjective:    HPI    {[facility] HPI texts:82521}    {Common ambulatory SmartLinks:99099}         Objective:    Blood pressure 104/72, pulse 73, height 5' 4" (1 626 m), weight 90 7 kg (200 lb)  Physical Exam    Neurological Exam      ROS:    Review of Systems   Constitutional: Negative  HENT: Negative  Eyes: Negative  Respiratory: Negative  Cardiovascular: Negative  Gastrointestinal: Negative  Endocrine: Negative  Genitourinary: Negative  Musculoskeletal: Negative  Skin: Negative  Allergic/Immunologic: Negative  Neurological: Positive for headaches (not as often)  Hematological: Negative  Psychiatric/Behavioral: Negative

## 2018-12-17 NOTE — PATIENT INSTRUCTIONS
Migraine without aura and without status migrainosus, not intractable    Headache/migraine treatment:   Abortive medications (for immediate treatment of a headache): It is ok to take ibuprofen, acetaminophen or naproxen (Advil, Tylenol,  Aleve, Excedrin) if they help your headaches you should limit these to No more than 3 times a week to avoid medication overuse/rebound headaches  OK to take this as needed for the more moderate to severe migraines, do not take it with any ibuprofen or naproxen as the combination would be hard on your stomach  -     ketorolac (TORADOL) 10 mg tablet; Take 1 tablet (10 mg total) by mouth daily as needed for severe pain Max 1/day, 3/week, 10/month  Do not use with other OTC pain meds    For your more moderate to severe migraines take this medication early   - Maxalt (rizatriptan) 10mg tabs - take one at the onset of headache  May repeat one time after 1-2 hours if pain has not resolved      Over the counter preventive supplements for headaches/migraines   (to take every day to help prevent headaches - not to take at the time of headache):  [x] Magnesium 400mg daily   [x] Riboflavin (Vitamin B2)  400mg daily   (FYI B2 may make your urine bright/neon yellow)     Prescription preventive medications for headaches/migraines   (to take every day to help prevent headaches - not to take at the time of headache):  [x] topiramate (TOPAMAX) continue 50 mg twice a day  This may help with:  [x] Headache prevention     *Typically these types of medications take time untill you see the benefit, although some may see improvement in days, often it may take weeks, especially if the medication is being titrated up to a beneficial level  Please contact us if there are any concerns or questions regarding the medication       Sleep/headache prevention:   [x] Melatonin - you may take 3 mg nightly for sleep  You should take this 1 hour prior to bedtime consistently every night for it to work   It works by gradually helping to adjust your sleep time over days to weeks, rather than immediately making you feel sleepy        Self-Monitoring:  [x] Headache calendar  Each day ray a number from 0-10 indicating if there was a headache and how bad it was  This can be used to monitor gradual improvement and is helpful to make medication adjustments  You can do this on paper or there is an WES for a smart phone called "Migraine e Diary"       Lifestyle Recommendations:  [x] SLEEP - Maintain a regular sleep schedule: Adults need at least 7-8 hours of uninterrupted a night  Maintain good sleep hygiene:  Going to bed and waking up at consistent times, avoiding excessive daytime naps, avoiding caffeinated beverages in the evening, avoid excessive stimulation in the evening and generally using bed primarily for sleeping  One hour before bedtime would recommend turning lights down lower, decreasing your activity (may read quietly, listen to music at a low volume)  When you get into bed, should eliminate all technology (no texting, emailing, playing with your phone, iPad or tablet in bed)  [x] HYDRATION - Maintain good hydration  Drink  2L of fluid a day (4 typical small water bottles)  [x] DIET - Maintain good nutrition  In particular don't skip meals and try and eat healthy balanced meals regularly  _Myfitnesspal is a great help for weight loss - try it for a week  [x] TRIGGERS - Look for other triggers and avoid them: Limit caffeine to 1-2 cups a day or less  Avoid dietary triggers that you have noticed bring on your headaches (this could include aged cheese, peanuts, MSG, aspartame and nitrates)      Education and Follow-up  [x] Please call with any questions or concerns     [x] Follow up 3 months

## 2018-12-19 ENCOUNTER — TELEPHONE (OUTPATIENT)
Dept: NEUROLOGY | Facility: CLINIC | Age: 38
End: 2018-12-19

## 2018-12-19 NOTE — TELEPHONE ENCOUNTER
LMOM for pt to return call  I wanted to see how much medication she had left and advise her that we can attempt a PA after 1/1/19 as long as she has enough medication, if we submit it early it will come back as product already covered

## 2018-12-19 NOTE — TELEPHONE ENCOUNTER
----- Message from [facility], RN sent at 12/18/2018 11:25 AM EST -----      ----- Message -----  From: Radha Tyler  Sent: 12/17/2018   4:33 PM  To: Dionne Anderson RN        ----- Message -----  From: Vincent Nathan MD  Sent: 12/17/2018   4:06 PM  To: Rosy Miller reports that she is switching insurances and her new insurance company in January declared they are not going to cover topiramate, which seems odd to me and is upsetting since it is helping with her headaches/migraines  I asked Kaye Hoffmann and she suggested contacting you to see if you had any further information or advice about what we should do next

## 2018-12-20 NOTE — TELEPHONE ENCOUNTER
Patient states she has one refill left and will get it filled, and it should last her until the second week in January  Informed her we can initiate a PA once her new insurance is active after Jan 1 2019

## 2018-12-29 ENCOUNTER — OFFICE VISIT (OUTPATIENT)
Dept: URGENT CARE | Facility: CLINIC | Age: 38
End: 2018-12-29
Payer: COMMERCIAL

## 2018-12-29 VITALS
DIASTOLIC BLOOD PRESSURE: 79 MMHG | RESPIRATION RATE: 16 BRPM | TEMPERATURE: 97.1 F | HEIGHT: 65 IN | HEART RATE: 67 BPM | BODY MASS INDEX: 34.16 KG/M2 | SYSTOLIC BLOOD PRESSURE: 115 MMHG | OXYGEN SATURATION: 98 % | WEIGHT: 205 LBS

## 2018-12-29 DIAGNOSIS — J01.90 ACUTE NON-RECURRENT SINUSITIS, UNSPECIFIED LOCATION: Primary | ICD-10-CM

## 2018-12-29 PROCEDURE — S9088 SERVICES PROVIDED IN URGENT: HCPCS | Performed by: PHYSICIAN ASSISTANT

## 2018-12-29 PROCEDURE — 99213 OFFICE O/P EST LOW 20 MIN: CPT | Performed by: PHYSICIAN ASSISTANT

## 2018-12-29 RX ORDER — METHYLPREDNISOLONE 4 MG/1
TABLET ORAL
Qty: 21 TABLET | Refills: 0 | Status: SHIPPED | OUTPATIENT
Start: 2018-12-29 | End: 2019-01-13

## 2018-12-29 RX ORDER — FLUTICASONE PROPIONATE 50 MCG
1 SPRAY, SUSPENSION (ML) NASAL DAILY
Qty: 1 BOTTLE | Refills: 0 | Status: SHIPPED | OUTPATIENT
Start: 2018-12-29 | End: 2019-12-30

## 2018-12-29 RX ORDER — AMOXICILLIN AND CLAVULANATE POTASSIUM 875; 125 MG/1; MG/1
1 TABLET, FILM COATED ORAL EVERY 12 HOURS SCHEDULED
Qty: 14 TABLET | Refills: 0 | Status: SHIPPED | OUTPATIENT
Start: 2018-12-29 | End: 2019-01-05

## 2018-12-29 NOTE — PROGRESS NOTES
[facility]        NAME: Wendy Noe is a 43 y o  female  : 1980    MRN: [id number]  DATE: 2018  TIME: 10:33 AM    Assessment and Plan   Acute non-recurrent sinusitis, unspecified location [J01 90]  1  Acute non-recurrent sinusitis, unspecified location  amoxicillin-clavulanate (AUGMENTIN) 875-125 mg per tablet    fluticasone (FLONASE) 50 mcg/act nasal spray    Methylprednisolone 4 MG TBPK         Patient Instructions     Patient Instructions   You are being treated for a sinus infection  Take medication as directed  Follow up with your family doctor next week if not improved        Proceed to  ER if symptoms worsen  Chief Complaint     Chief Complaint   Patient presents with    Cold Like Symptoms     Pt reports sinus pressure, ear pain, and headaches x 4 days  Reports taking aspirin and doing sinus washes with no relief  History of Present Illness       Pt presents with severe sinus congestion for the past 4 days  She c/o sinus pain and sinus headache  BL ear discomfort, post nasal drainage,fatigue  She has been using a nasal wash and taking sudafed without relief  No fever        Review of Systems   Review of Systems   Constitutional: Negative  HENT: Positive for congestion, ear pain, postnasal drip, sinus pain and sinus pressure  Respiratory: Negative  Skin: Negative  Neurological: Positive for headaches           Current Medications       Current Outpatient Prescriptions:     amoxicillin-clavulanate (AUGMENTIN) 875-125 mg per tablet, Take 1 tablet by mouth every 12 (twelve) hours for 7 days, Disp: 14 tablet, Rfl: 0    Cholecalciferol (VITAMIN D3) 2000 units TABS, Take by mouth, Disp: , Rfl:     DULoxetine (CYMBALTA) 20 mg capsule, 40 mg daily, Disp: , Rfl:     fluticasone (FLONASE) 50 mcg/act nasal spray, 1 spray into each nostril daily, Disp: 1 Bottle, Rfl: 0    ketorolac (TORADOL) 10 mg tablet, Take 1 tablet (10 mg total) by mouth daily as needed for severe pain Max 1/day, 3/week, 10/month   Do not use with other OTC pain meds, Disp: 10 tablet, Rfl: 0    LORazepam (ATIVAN) 1 mg tablet, Take by mouth, Disp: , Rfl:     Magnesium Oxide -Mg Supplement 400 MG CAPS, Take by mouth, Disp: , Rfl:     Melatonin 3 MG CAPS, Take 3 mg by mouth, Disp: , Rfl:     Methylprednisolone 4 MG TBPK, Use as directed on package, Disp: 21 tablet, Rfl: 0    ondansetron (ZOFRAN-ODT) 4 mg disintegrating tablet, , Disp: , Rfl:     Riboflavin (B2 PO), Take by mouth, Disp: , Rfl:     rizatriptan (MAXALT) 10 MG tablet, Take 1 tablet (10 mg total) by mouth once as needed for migraine for up to 1 dose May repeat in 2 hours if needed, Disp: 12 tablet, Rfl: 0    topiramate (TOPAMAX) 25 mg tablet, 1 tab PO QHS for 1 week, increase as tolerated to 1 tab BID for 1 week, then 1 tab QAM and 2 tabs QHS for 1 week and finish at 2 tabs BID , Disp: 120 tablet, Rfl: 1    Current Allergies     Allergies as of 12/29/2018 - Reviewed 12/29/2018   Allergen Reaction Noted    Pregabalin  04/12/2017            The following portions of the patient's history were reviewed and updated as appropriate: allergies, current medications, past family history, past medical history, past social history, past surgical history and problem list      Past Medical History:   Diagnosis Date    Chickenpox        Past Surgical History:   Procedure Laterality Date    BACK SURGERY      LOWER Description: LS-S1 disc surgery    ORTHOPEDIC SURGERY      WTE    WISDOM TOOTH EXTRACTION  08/1999       Family History   Problem Relation Age of Onset    Hypertension Father     Breast cancer Maternal Grandmother     Stroke Maternal Grandmother     Cancer Maternal Grandfather         with unknown primary sit     Breast cancer Paternal Grandmother     Other Son         Neuroblastoma of abdomen    Breast cancer Maternal Aunt     Ovarian cancer Maternal Aunt         at age 28    Breast cancer Paternal Aunt         dx at age 52         Medications have been verified  Objective   /79 (BP Location: Left arm)   Pulse 67   Temp (!) 97 1 °F (36 2 °C) (Tympanic)   Resp 16   Ht 5' 5" (1 651 m)   Wt 93 kg (205 lb)   SpO2 98%   BMI 34 11 kg/m²        Physical Exam     Physical Exam   Constitutional: She appears well-developed  No distress  HENT:   Right Ear: Tympanic membrane, external ear and ear canal normal    Left Ear: Tympanic membrane, external ear and ear canal normal    Nose: Mucosal edema and rhinorrhea present  Right sinus exhibits maxillary sinus tenderness  Right sinus exhibits no frontal sinus tenderness  Left sinus exhibits no maxillary sinus tenderness and no frontal sinus tenderness  Mouth/Throat: Mucous membranes are normal  No oropharyngeal exudate, posterior oropharyngeal edema or posterior oropharyngeal erythema  Post nasal drip   Neck: Normal range of motion  Cardiovascular: Normal rate and regular rhythm  Pulmonary/Chest: Effort normal and breath sounds normal    Lymphadenopathy:     She has no cervical adenopathy  Skin: Skin is warm and dry  Nursing note and vitals reviewed

## 2018-12-29 NOTE — PATIENT INSTRUCTIONS
You are being treated for a sinus infection  Take medication as directed  Follow up with your family doctor next week if not improved

## 2019-01-13 ENCOUNTER — TELEPHONE (OUTPATIENT)
Dept: NEUROLOGY | Facility: CLINIC | Age: 39
End: 2019-01-13

## 2019-01-13 DIAGNOSIS — G43.009 MIGRAINE WITHOUT AURA AND WITHOUT STATUS MIGRAINOSUS, NOT INTRACTABLE: Primary | ICD-10-CM

## 2019-01-13 RX ORDER — DEXAMETHASONE 1 MG
TABLET ORAL
Qty: 3 TABLET | Refills: 0 | Status: SHIPPED | OUTPATIENT
Start: 2019-01-13 | End: 2019-03-07 | Stop reason: SDUPTHER

## 2019-01-21 DIAGNOSIS — G43.009 MIGRAINE WITHOUT AURA AND WITHOUT STATUS MIGRAINOSUS, NOT INTRACTABLE: ICD-10-CM

## 2019-01-21 DIAGNOSIS — G43.709 CHRONIC MIGRAINE WITHOUT AURA WITHOUT STATUS MIGRAINOSUS, NOT INTRACTABLE: ICD-10-CM

## 2019-01-21 RX ORDER — TOPIRAMATE 25 MG/1
TABLET ORAL
Qty: 120 TABLET | Refills: 0 | Status: SHIPPED | OUTPATIENT
Start: 2019-01-21 | End: 2019-02-21 | Stop reason: SDUPTHER

## 2019-01-21 RX ORDER — KETOROLAC TROMETHAMINE 10 MG/1
10 TABLET, FILM COATED ORAL DAILY PRN
Qty: 10 TABLET | Refills: 0 | Status: SHIPPED | OUTPATIENT
Start: 2019-01-21 | End: 2019-10-14

## 2019-01-21 RX ORDER — RIZATRIPTAN BENZOATE 10 MG/1
10 TABLET ORAL ONCE AS NEEDED
Qty: 12 TABLET | Refills: 0 | Status: SHIPPED | OUTPATIENT
Start: 2019-01-21 | End: 2019-02-21 | Stop reason: SDUPTHER

## 2019-01-21 NOTE — TELEPHONE ENCOUNTER
From: Jennine Favre  Sent: 1/21/2019 8:05 AM EST  Subject: Medication Renewal Request    Jennine Favre would like a refill of the following medications:     rizatriptan (MAXALT) 10 MG tablet Jami Gutierrez MD]     topiramate (TOPAMAX) 25 mg tablet Jami Gutierrez MD]     ketorolac (TORADOL) 10 mg tablet Jami Gutierrez MD]    Preferred pharmacy: [place] [id number]    Comment:

## 2019-02-21 DIAGNOSIS — G43.709 CHRONIC MIGRAINE WITHOUT AURA WITHOUT STATUS MIGRAINOSUS, NOT INTRACTABLE: ICD-10-CM

## 2019-02-21 RX ORDER — RIZATRIPTAN BENZOATE 10 MG/1
10 TABLET ORAL ONCE AS NEEDED
Qty: 12 TABLET | Refills: 3 | Status: SHIPPED | OUTPATIENT
Start: 2019-02-21 | End: 2019-03-27

## 2019-02-21 RX ORDER — RIZATRIPTAN BENZOATE 10 MG/1
10 TABLET ORAL ONCE AS NEEDED
Qty: 12 TABLET | Refills: 0 | OUTPATIENT
Start: 2019-02-21

## 2019-02-21 RX ORDER — TOPIRAMATE 25 MG/1
TABLET ORAL
Qty: 120 TABLET | Refills: 3 | Status: SHIPPED | OUTPATIENT
Start: 2019-02-21 | End: 2019-03-13 | Stop reason: SDUPTHER

## 2019-02-21 RX ORDER — TOPIRAMATE 25 MG/1
TABLET ORAL
Qty: 120 TABLET | Refills: 0 | OUTPATIENT
Start: 2019-02-21

## 2019-03-07 DIAGNOSIS — G43.009 MIGRAINE WITHOUT AURA AND WITHOUT STATUS MIGRAINOSUS, NOT INTRACTABLE: ICD-10-CM

## 2019-03-07 RX ORDER — DEXAMETHASONE 1 MG
2 TABLET ORAL
Qty: 10 TABLET | Refills: 0 | Status: SHIPPED | OUTPATIENT
Start: 2019-03-07 | End: 2019-03-12

## 2019-03-07 NOTE — TELEPHONE ENCOUNTER
Pt called to report migraine "on and off" for past week  has tried rizatriptan and toradol  States decadron typically aborts migraine  Rx entered for auth

## 2019-03-13 DIAGNOSIS — G43.709 CHRONIC MIGRAINE WITHOUT AURA WITHOUT STATUS MIGRAINOSUS, NOT INTRACTABLE: ICD-10-CM

## 2019-03-18 RX ORDER — TOPIRAMATE 25 MG/1
TABLET ORAL
Qty: 120 TABLET | Refills: 3 | Status: SHIPPED | OUTPATIENT
Start: 2019-03-18 | End: 2019-07-03 | Stop reason: SDUPTHER

## 2019-03-20 ENCOUNTER — TELEPHONE (OUTPATIENT)
Dept: NEUROLOGY | Facility: CLINIC | Age: 39
End: 2019-03-20

## 2019-03-20 NOTE — TELEPHONE ENCOUNTER
Received fax for refill of topiramate 25mg pt just canceled an appt and does not have one schedule  Contacted pt to schedule appt before refilling rx  lmom stating she does have to schedule an appt before refill can be done

## 2019-03-26 ENCOUNTER — TELEPHONE (OUTPATIENT)
Dept: NEUROLOGY | Facility: CLINIC | Age: 39
End: 2019-03-26

## 2019-03-26 NOTE — PROGRESS NOTES
[facility] Consult - Follow up   PATIENT:  Mrac Gorman  MRN:  [id number]  :  1980  DATE OF SERVICE:  3/27/2019  REFERRED BY: No ref  provider found  PMD: Dell Redmond PA-C    Assessment/Plan:      Lisandra Phelps a 43 y o  female with a past medical history of anxiety, depression, vitamin-D deficiency, herniated lumbar disc referred here for evaluation of headache/migraine  Initial visit 2018  Follow-up 2018, 2019    Chronic daily headache  Chronic migraine without aura without status migrainosus, not intractable   She reports throughout her life she really only had headaches about once a month until 3 years ago at age 27 she started developing daily headaches and migraines   Migraines typically a few days prior to menses  Typically her headaches and migraines are right frontal/temporal with associated symptoms of nausea, phonophobia, Osmophobia, lightheadedness and problems with concentration, movement makes them worse     - as of 18: 6-9 migraine days a month, chronic daily headache  - as of 2018:   Topiramate has cut  chronic daily headaches in half  - As of 3/27/19: migraines 3-4 in a month, daily headaches are gone    Workup:  - MRI Brain with and without contrast 2018: Several small white matter hyperintensities are seen on T2 and FLAIR imaging within the frontal lobes without mass effect, diffusion abnormality or abnormal enhancement   These are nonspecific in a patient of this age and white matter lesions have been described within the frontal lobes in patients with chronic migraine headaches       Preventative:  - continue magnesium 400 mg a day, vitamin B2 400 daily and melatonin   - We discussed headache hygiene and lifestyle factors which may include improve her headaches including hydration, exercise, diet and weight loss     - We discussed starting an antidepressant which could help with her mood and headache prevention however she has been on multiple antidepressants in the past with side effects   - continue topiramate 50 mg b i d  With improvement in chronic daily headaches  - For prevention could consider alternative medications such as amitriptyline, Depakote, propranolol, Botox, or other newer migraine injectables      Abortive:  - continue Toradol 10 mg - although sometimes helps, sometimes doesn't  Discussed side effects and risks  - since intolerant to rizatriptan and sumatriptan, will try Axert 6 25 mg for abortive  Discussed side effects and risks  - past: Sumatriptan has worked a few times however most the time this makes her too sleepy to continue with her day   rizatriptan 10 mg - too sleepy to work, helped headache though   - She has tried over-the-counter pain medications in the past however these did not work and she has not used these regularly in a while     -  future options:  indomethacin 50 mg t i d  For 3 days - she thinks this knocked out/decreased in half daily headaches, dexamethasone 3 mg for 3-5 days that helped get rid of a migraine, could use again in future   - Other options to consider in the future for abortive include Depakote       Vitamin-D deficiency  - Her primary care doctor is already repleting her vitamin-D which was recently found to be low at 17  4          Patient instructions   - consider talking to OB about birth control that can better regulate your periods     Headache/migraine treatment:   Abortive medications (for immediate treatment of a headache): It is ok to take ibuprofen, acetaminophen or naproxen (Advil, Tylenol,  Aleve, Excedrin) if they help your headaches you should limit these to No more than 3 times a week to avoid medication overuse/rebound headaches       OK to take this as needed for the more moderate to severe migraines, do not take it with any ibuprofen or naproxen as the combination would be hard on your stomach  - ketorolac (TORADOL) 10 mg tablet;  Take 1 tablet (10 mg total) by mouth daily as needed for severe pain Max 1/day, 3/week, 10/month  Do not use with other OTC pain meds - take with food      For your more moderate to severe migraines take this medication early   -   almotriptan (AXERT) 6 25 MG tablet; Take 1 tablet (6 25 mg total) by mouth once as needed for migraine for up to 1 dose may repeat in 2 hours if needed  (she was intolerant/had side effects from sumatriptan and rizatriptan, therefore trial of non formulary medication)     Over the counter preventive supplements for headaches/migraines   (to take every day to help prevent headaches - not to take at the time of headache):  [x] Magnesium 400mg daily   [x] Riboflavin (Vitamin B2)  400mg daily   (FYI B2 may make your urine bright/neon yellow)     Prescription preventive medications for headaches/migraines   (to take every day to help prevent headaches - not to take at the time of headache):  [x] topiramate (TOPAMAX) continue 50 mg twice a day  This may help with:  [x] Headache prevention     *Typically these types of medications take time untill you see the benefit, although some may see improvement in days, often it may take weeks, especially if the medication is being titrated up to a beneficial level  Please contact us if there are any concerns or questions regarding the medication       Sleep/headache prevention:   [x] Melatonin - you may take 3 mg nightly for sleep  You should take this 1 hour prior to bedtime consistently every night for it to work  It works by gradually helping to adjust your sleep time over days to weeks, rather than immediately making you feel sleepy        Self-Monitoring:  [x] Headache calendar  Each day ray a number from 0-10 indicating if there was a headache and how bad it was   This can be used to monitor gradual improvement and is helpful to make medication adjustments   You can do this on paper or there is an WES for a smart phone called "Migraine e Diary"       Lifestyle Recommendations:  [x] SLEEP - Maintain a regular sleep schedule: Adults need at least 7-8 hours of uninterrupted a night  Maintain good sleep hygiene:  Going to bed and waking up at consistent times, avoiding excessive daytime naps, avoiding caffeinated beverages in the evening, avoid excessive stimulation in the evening and generally using bed primarily for sleeping   One hour before bedtime would recommend turning lights down lower, decreasing your activity (may read quietly, listen to music at a low volume)  When you get into bed, should eliminate all technology (no texting, emailing, playing with your phone, iPad or tablet in bed)  [x] HYDRATION - Maintain good hydration   Drink  2L of fluid a day (4 typical small water bottles)  [x] DIET - Maintain good nutrition  In particular don't skip meals and try and eat healthy balanced meals regularly  _Myfitnesspal is a great help for weight loss  [x] TRIGGERS - Look for other triggers and avoid them: Limit caffeine to 1-2 cups a day or less  Avoid dietary triggers that you have noticed bring on your headaches (this could include aged cheese, peanuts, MSG, aspartame and nitrates)      Education and Follow-up  [x] Please call with any questions or concerns  [x] Follow up 4 months, but if no issues push back to 6 months            CC: We had the pleasure of evaluating Joss Ruvalcaba neurological consultation today   she is a 38 y o    right handed female who presents today for evaluation of headaches       History of Present Illness:   Interval history as of 03/27/2019  - 01/21/2019 refilled rizatriptan 10 mg, topiramate, Toradol 10 mg  - called in 03/07/2019 reporting migraine not responding to rizatriptan or Toradol - 2 mg dexamethasone worked  - has cut out diet soda which she used to take daily  - Daily headaches are gone  - rizatriptan does not seem to be working most of the time and knocks her out and migraine comes back the next day   - toradol does not help too much, but has been hesitant to take it   - migraines 3-4 times a month  - denies SE     Interval events as of 12/17/2018:   - has decreased daily OTC pain meds use, to once or twice in past 5 weeks   - topiramate has cut daily headaches in half  12/3/18: migraine treated with dexamethasone 1 mg daily for 3 days  - bad news yesterday, they get insurance through the state, topiramate will not be covered      What is your current pain level - 0  Headaches started at what age? Started regularly at 29 years old  How often do the headaches occur?   - as of 11/2018 - Past 3 years daily headache and migraines (Before that once a month)  - As of 12/17/18: Chronic daily headaches - have been cut in half  6-9 migraine days a month - in past 5 weeks has had 8 (11/20, 26, 12/1,2,3,4,15,16)  - As of 3/27/19: migraines 3-4 in a month, daily headaches are gone  What time of the day do the headaches start?   no particular time for migraines, headaches may wake up with them   How long do the headaches last?    3-4 days  Are you ever headache free? No     Aura?  without aura    Has a prodrome where she can tell it is going to be a migraine by the intensity of which it comes on      Describe your usual headache pain quality?   dull, stabbing, throbbing and pressure - pounding the most  Where is your headache located?  Usually on the right frontal/temporal, when starting to go away goes to left frontal - these are every day headaches    Migraines - usually just on the right frontal/temporal, sometimes right occipital      Does the pain Radiate?  no radiation of pain  What is the intensity of pain?   Normal 5, migraines 7   Associated symptoms:   [x] Nausea       [] Vomiting        [] Diarrhea         [] Insomnia           [] Stiff or sore neck          [x] Problems with concentration  [] Photophobia      [x]Phonophobia      [x] Osmophobia  [] Blurred vision   [x] Prefer quiet, dark room        [x] Light-headed or dizzy    [] Tinnitus      [] Red ear      [] Ptosis      [] Facial droop  [] Lacrimation  [] Nasal congestion/rhinorrhea   [] Flushing of face         [] Change in pupil size  [] Hands or feet tingle or feel numb/paresthesias       Number of days missed per month because of headaches:  Work days: has to push through and  work  Social or Family activities:  frequently       Things that make the headache worse?  Movement - with migraine, coughing, sneezing, bending over,      Headache triggers:   stress, change in eating pattern, menses, weather changes   What time of the year do headaches occur more frequently?   do not seem to be related to any time of the year      Have you seen someone else for headaches or pain? No - pain doctor in the past for herniated disc in back - had cortisone injection and did not work, surgery helped   Have you had trigger point injection performed and how often? No  Have you had Botox injection performed and how often? No   Have you had epidural injections or transforaminal injections performed? No  Have you used CBD or THC for your headaches and how often?  No  Are you current pregnant or planning on getting pregnant?  No, husbands is fixed   Have you ever had any Brain imaging? Yes, MRI Brain      What medications do you take or have you taken for your headaches? ABORTIVE:   - OTC pain meds did not help - does not use more than 3 days per week    - rizatriptan too tired   - toradol 10 mg PO  started 12/17/18        Decadron 1 mg for 3 days has helped migraine  Indomethacin 50 mg t i d   For 3 days decreased chronic daily headache     PREVENTIVE:   -topiramate 50 mg b i d    Mg, b2   Vit D      Past meds:  - amitriptyline - does not remember clearly, went off when pregnant   - Gabapentin in past  - increased anxiety      Alternative therapies used in the past for headaches? no other headache interventions have been tried     Neck pain?: No     LIFESTYLE  Sleep - averages 7-8 hours     Is your sleep restful?  Yes, 50/50  How often do you get up at night? Wakes up about once half the nights   Do you wake up with headaches? Yes  Do you snore while asleep? No  Have you been told that you stop breathing during sleeping? No  Do you wake up tired in the morning? Yes  Do you take frequent naps during the day? Yes, used to every day, now cant because of work schedule      Physical activity: none -  Water: 100 ounzes        Mood: stable  History of Bad anxiety and depression  - following with psychiatrist   Takes:   Ativan 0 5 mg once in the am   cymbalta 40 mg daily - started about a year ago, weaning down to 30 mg      In the past:  - lexapro, buspar for the longest - helped depression  prazosin the past for PTSD/nightmares  Vistaril 25 mg in past  Gabapentin in past   lyrica in past made her have SI   wellbutrin made her anxiety worse    The following portions of the patient's history were reviewed and updated as appropriate: allergies, current medications, past family history, past medical history, past social history, past surgical history and problem list     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  Education: Associates degree   Instructional school assistant  Lives with  marlon, son Iraida Fall, daughter [de-identified]   13year old boy and 15year old daughter   Lost other son to cancer 10 years ago - neuroblastoma      Past Medical History:     Past Medical History:   Diagnosis Date    Chickenpox        Patient Active Problem List   Diagnosis    Anxiety and depression    Dietary calcium deficiency    Lumbar herniated disc    Vitamin D deficiency    Intractable migraine without aura and with status migrainosus    Chronic daily headache    Chronic migraine without aura without status migrainosus, not intractable       Medications:      Current Outpatient Medications   Medication Sig Dispense Refill    Cholecalciferol (VITAMIN D3) 2000 units TABS Take by mouth      DULoxetine (CYMBALTA) 20 mg capsule 40 mg daily      fluticasone (FLONASE) 50 mcg/act nasal spray 1 spray into each nostril daily 1 Bottle 0    ketorolac (TORADOL) 10 mg tablet Take 1 tablet (10 mg total) by mouth daily as needed for severe pain Max 1/day, 3/week, 10/month  Do not use with other OTC pain meds 10 tablet 0    LORazepam (ATIVAN) 1 mg tablet Take by mouth      Magnesium Oxide -Mg Supplement 400 MG CAPS Take by mouth      Melatonin 3 MG CAPS Take 3 mg by mouth      Riboflavin (B2 PO) Take by mouth      topiramate (TOPAMAX) 25 mg tablet 1 tab PO QHS for 1 week, increase as tolerated to 1 tab BID for 1 week, then 1 tab QAM and 2 tabs QHS for 1 week and finish at 2 tabs BID  120 tablet 3    almotriptan (AXERT) 6 25 MG tablet Take 1 tablet (6 25 mg total) by mouth once as needed for migraine for up to 1 dose may repeat in 2 hours if needed 6 tablet 3    ondansetron (ZOFRAN-ODT) 4 mg disintegrating tablet        No current facility-administered medications for this visit  Allergies:       Allergies   Allergen Reactions    Pregabalin      Other reaction(s): Suicidal ideation       Family History:     Family History   Problem Relation Age of Onset    Hypertension Father     Breast cancer Maternal Grandmother     Stroke Maternal Grandmother     Cancer Maternal Grandfather         with unknown primary sit     Breast cancer Paternal Grandmother     Other Son         Neuroblastoma of abdomen    Breast cancer Maternal Aunt     Ovarian cancer Maternal Aunt         at age 28    Breast cancer Paternal Aunt         dx at age 52       Social History:     Social History     Socioeconomic History    Marital status: /Civil Union     Spouse name: Not on file    Number of children: Not on file    Years of education: associates degree    Highest education level: Not on file   Occupational History    Occupation: Patent processor   Social Needs    Financial resource strain: Not on file    Food insecurity:     Worry: Not on file     Inability: Not on file    Transportation needs:     Medical: Not on file     Non-medical: Not on file   Tobacco Use    Smoking status: Never Smoker    Smokeless tobacco: Never Used   Substance and Sexual Activity    Alcohol use: No    Drug use: No    Sexual activity: Not on file   Lifestyle    Physical activity:     Days per week: Not on file     Minutes per session: Not on file    Stress: Not on file   Relationships    Social connections:     Talks on phone: Not on file     Gets together: Not on file     Attends Advent service: Not on file     Active member of club or organization: Not on file     Attends meetings of clubs or organizations: Not on file     Relationship status: Not on file    Intimate partner violence:     Fear of current or ex partner: Not on file     Emotionally abused: Not on file     Physically abused: Not on file     Forced sexual activity: Not on file   Other Topics Concern    Not on file   Social History Narrative    Lack of exercise     Yazidism affiliation - non Mormon Buddhist    Hobbies: reading, bike rides, volunteering    Employed:          Objective:     Physical Exam:                                                                 Vitals:            Constitutional:    /72 (BP Location: Right arm, Patient Position: Sitting, Cuff Size: Standard)   Pulse (!) 20   Ht 5' 5" (1 651 m)   Wt 92 5 kg (204 lb)   BMI 33 95 kg/m²   BP Readings from Last 3 Encounters:   03/27/19 108/72   12/29/18 115/79   12/17/18 104/72     Pulse Readings from Last 3 Encounters:   03/27/19 (!) 20   12/29/18 67   12/17/18 73         Well developed, well nourished, well groomed  No dysmorphic features  HEENT:  Normocephalic atraumatic  No meningismus  Oropharynx is clear and moist  No oral mucosal lesions  Chest:  Respirations regular and unlabored  Cardiovascular:  Regular rate, intact distal pulses   Distal extremities warm without palpable edema or tenderness, no observed significant swelling  Musculoskeletal:  Full range of motion  (see below under neurologic exam for evaluation of motor function and gait)   Skin:  warm and dry, not diaphoretic  No apparent birthmarks or stigmata of neurocutaneous disease  Psychiatric:  Normal behavior and appropriate affect        Neurological Examination:     Mental status/cognitive function:   Orientated to time, place and person  Recent and remote memory intact  Attention span and concentration as well as fund of knowledge are appropriate for age  Normal language and spontaneous speech  Cranial Nerves:  III, IV, VI-Pupils were equal, round, and reactive to light  Extraocular movements were full and conjugate without nystagmus  conjugate gaze, normal smooth pursuits, normal saccades   VII-facial expression symmetric, intact forehead wrinkle, strong eye closure, symmetric smile    VIII-hearing grossly intact bilaterally   Motor Exam: symmetric bulk throughout  no atrophy, fasciculations or abnormal movements noted  Coordination:  no apparent dysmetria, ataxia or tremor noted  Gait: steady casual gait        Pertinent lab results:   10/2018 Vit D 18 3 - replacement started     11/13/2018:  CMP within normal limits  B12 393  TSH 0 954     Imaging:   [facility] 10/29/18:  Personally reviewed and unremarkable agree with radiology read of normal      MRI Brain with and without contrast 11/24/2018  Several small white matter hyperintensities are seen on T2 and FLAIR imaging within the frontal lobes without mass effect, diffusion abnormality or abnormal enhancement   These are nonspecific in a patient of this age and white matter lesions have been described within the frontal lobes in patients with chronic migraine headaches  Review of Systems:   ZEUS Personally reviewed          Review of Systems   Constitutional: Negative  HENT: Negative  Eyes: Negative  Respiratory: Negative  Cardiovascular: Negative  Gastrointestinal: Positive for nausea  Endocrine: Negative  Genitourinary: Negative  Musculoskeletal: Negative  Skin: Negative  Allergic/Immunologic: Negative  Neurological: Positive for headaches   Hematological: Negative  Psychiatric/Behavioral: negative   All other systems reviewed and are negative             I have spent 25 minutes with Patient  today in which greater than 50% of this time was spent in counseling/coordination of care regarding Prognosis, Risks and benefits of tx options, Intructions for management, Importance of tx compliance, Risk factor reductions and Impressions        Author:  Luis Haywood MD 3/27/2019 10:25 AM

## 2019-03-27 ENCOUNTER — TELEPHONE (OUTPATIENT)
Dept: NEUROLOGY | Facility: CLINIC | Age: 39
End: 2019-03-27

## 2019-03-27 ENCOUNTER — OFFICE VISIT (OUTPATIENT)
Dept: NEUROLOGY | Facility: CLINIC | Age: 39
End: 2019-03-27
Payer: COMMERCIAL

## 2019-03-27 VITALS
SYSTOLIC BLOOD PRESSURE: 108 MMHG | DIASTOLIC BLOOD PRESSURE: 72 MMHG | BODY MASS INDEX: 33.99 KG/M2 | HEIGHT: 65 IN | WEIGHT: 204 LBS | HEART RATE: 20 BPM

## 2019-03-27 DIAGNOSIS — R51.9 CHRONIC DAILY HEADACHE: ICD-10-CM

## 2019-03-27 DIAGNOSIS — G43.709 CHRONIC MIGRAINE WITHOUT AURA WITHOUT STATUS MIGRAINOSUS, NOT INTRACTABLE: Primary | ICD-10-CM

## 2019-03-27 PROCEDURE — 99214 OFFICE O/P EST MOD 30 MIN: CPT | Performed by: PSYCHIATRY & NEUROLOGY

## 2019-03-27 RX ORDER — ALMOTRIPTAN MALATE 6.25 MG/1
6.25 TABLET, COATED ORAL ONCE AS NEEDED
Qty: 6 TABLET | Refills: 3 | Status: SHIPPED | OUTPATIENT
Start: 2019-03-27 | End: 2019-07-03

## 2019-03-27 NOTE — TELEPHONE ENCOUNTER
Pt called and states that she received a voicemail message from [facility] asking her [employer] contact #  eliceo Kyle see below             [address]   Fax [phone]

## 2019-03-27 NOTE — PATIENT INSTRUCTIONS
- consider talking to OB about birth control that can better regulate your periods     Headache/migraine treatment:   Abortive medications (for immediate treatment of a headache): It is ok to take ibuprofen, acetaminophen or naproxen (Advil, Tylenol,  Aleve, Excedrin) if they help your headaches you should limit these to No more than 3 times a week to avoid medication overuse/rebound headaches       OK to take this as needed for the more moderate to severe migraines, do not take it with any ibuprofen or naproxen as the combination would be hard on your stomach  - ketorolac (TORADOL) 10 mg tablet; Take 1 tablet (10 mg total) by mouth daily as needed for severe pain Max 1/day, 3/week, 10/month  Do not use with other OTC pain meds - take with food      For your more moderate to severe migraines take this medication early   -   almotriptan (AXERT) 6 25 MG tablet; Take 1 tablet (6 25 mg total) by mouth once as needed for migraine for up to 1 dose may repeat in 2 hours if needed  (she was intolerant/had side effects from sumatriptan and rizatriptan, therefore trial of non formulary medication)     Over the counter preventive supplements for headaches/migraines   (to take every day to help prevent headaches - not to take at the time of headache):  [x] Magnesium 400mg daily   [x] Riboflavin (Vitamin B2)  400mg daily   (FYI B2 may make your urine bright/neon yellow)     Prescription preventive medications for headaches/migraines   (to take every day to help prevent headaches - not to take at the time of headache):  [x] topiramate (TOPAMAX) continue 50 mg twice a day  This may help with:  [x] Headache prevention     *Typically these types of medications take time untill you see the benefit, although some may see improvement in days, often it may take weeks, especially if the medication is being titrated up to a beneficial level  Please contact us if there are any concerns or questions regarding the medication     Sleep/headache prevention:   [x] Melatonin - you may take 3 mg nightly for sleep  You should take this 1 hour prior to bedtime consistently every night for it to work  It works by gradually helping to adjust your sleep time over days to weeks, rather than immediately making you feel sleepy        Self-Monitoring:  [x] Headache calendar  Each day ray a number from 0-10 indicating if there was a headache and how bad it was   This can be used to monitor gradual improvement and is helpful to make medication adjustments  You can do this on paper or there is an WES for a smart phone called "Migraine e Diary"       Lifestyle Recommendations:  [x] SLEEP - Maintain a regular sleep schedule: Adults need at least 7-8 hours of uninterrupted a night  Maintain good sleep hygiene:  Going to bed and waking up at consistent times, avoiding excessive daytime naps, avoiding caffeinated beverages in the evening, avoid excessive stimulation in the evening and generally using bed primarily for sleeping   One hour before bedtime would recommend turning lights down lower, decreasing your activity (may read quietly, listen to music at a low volume)  When you get into bed, should eliminate all technology (no texting, emailing, playing with your phone, iPad or tablet in bed)  [x] HYDRATION - Maintain good hydration   Drink  2L of fluid a day (4 typical small water bottles)  [x] DIET - Maintain good nutrition  In particular don't skip meals and try and eat healthy balanced meals regularly  _Myfitnesspal is a great help for weight loss  [x] TRIGGERS - Look for other triggers and avoid them: Limit caffeine to 1-2 cups a day or less  Avoid dietary triggers that you have noticed bring on your headaches (this could include aged cheese, peanuts, MSG, aspartame and nitrates)      Education and Follow-up  [x] Please call with any questions or concerns     [x] Follow up 4 months, but if no issues push back to 6 months

## 2019-03-27 NOTE — PROGRESS NOTES
Patient ID: Marisela Mabry is a 43 y o  female  Assessment/Plan:    No problem-specific Assessment & Plan notes found for this encounter  {Assess/PlanSmartLinks:12499}       Subjective:    HPI    {[facility] HPI texts:16745}    {Common ambulatory SmartLinks:57402}         Objective:    Blood pressure 108/72, pulse (!) 20, height 5' 5" (1 651 m), weight 92 5 kg (204 lb)  Physical Exam    Neurological Exam      ROS:    Review of Systems   Constitutional: Negative  HENT: Negative  Eyes: Negative  Respiratory: Negative  Cardiovascular: Negative  Gastrointestinal: Positive for nausea  Endocrine: Negative  Genitourinary: Negative  Musculoskeletal: Negative  Skin: Negative  Allergic/Immunologic: Negative  Neurological: Positive for headaches (on daily headaches)  Hematological: Negative  Psychiatric/Behavioral: Positive for sleep disturbance (same)  All other systems reviewed and are negative

## 2019-04-24 ENCOUNTER — OFFICE VISIT (OUTPATIENT)
Dept: URGENT CARE | Facility: CLINIC | Age: 39
End: 2019-04-24
Payer: COMMERCIAL

## 2019-04-24 VITALS
BODY MASS INDEX: 34.15 KG/M2 | WEIGHT: 200 LBS | HEART RATE: 62 BPM | RESPIRATION RATE: 16 BRPM | DIASTOLIC BLOOD PRESSURE: 75 MMHG | TEMPERATURE: 98.8 F | SYSTOLIC BLOOD PRESSURE: 119 MMHG | OXYGEN SATURATION: 99 % | HEIGHT: 64 IN

## 2019-04-24 DIAGNOSIS — L03.90 CELLULITIS, UNSPECIFIED CELLULITIS SITE: Primary | ICD-10-CM
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
## 2019-04-24 PROCEDURE — G0382 LEV 3 HOSP TYPE B ED VISIT: HCPCS | Performed by: NURSE PRACTITIONER

## 2019-04-24 PROCEDURE — S9083 URGENT CARE CENTER GLOBAL: HCPCS | Performed by: NURSE PRACTITIONER

## 2019-04-24 RX ORDER — DOXYCYCLINE 100 MG/1
100 CAPSULE ORAL 2 TIMES DAILY
Qty: 42 CAPSULE | Refills: 0 | Status: SHIPPED | OUTPATIENT
Start: 2019-04-24 | End: 2019-05-15

## 2019-05-10 ENCOUNTER — TELEPHONE (OUTPATIENT)
Dept: NEUROLOGY | Facility: CLINIC | Age: 39
End: 2019-05-10

## 2019-05-10 DIAGNOSIS — G43.011 INTRACTABLE MIGRAINE WITHOUT AURA AND WITH STATUS MIGRAINOSUS: Primary | ICD-10-CM

## 2019-05-10 RX ORDER — DIVALPROEX SODIUM 250 MG/1
TABLET, DELAYED RELEASE ORAL
Qty: 8 TABLET | Refills: 0 | Status: SHIPPED | OUTPATIENT
Start: 2019-05-10 | End: 2019-07-03

## 2019-05-12 ENCOUNTER — TELEPHONE (OUTPATIENT)
Dept: NEUROLOGY | Facility: CLINIC | Age: 39
End: 2019-05-12

## 2019-05-13 DIAGNOSIS — G43.011 INTRACTABLE MIGRAINE WITHOUT AURA AND WITH STATUS MIGRAINOSUS: Primary | ICD-10-CM

## 2019-05-13 RX ORDER — DEXAMETHASONE 2 MG/1
2 TABLET ORAL
Qty: 5 TABLET | Refills: 0 | Status: SHIPPED | OUTPATIENT
Start: 2019-05-13 | End: 2019-05-18

## 2019-06-24 ENCOUNTER — TELEPHONE (OUTPATIENT)
Dept: NEUROLOGY | Facility: CLINIC | Age: 39
End: 2019-06-24

## 2019-06-24 DIAGNOSIS — R11.0 NAUSEA: ICD-10-CM

## 2019-06-24 DIAGNOSIS — G43.011 INTRACTABLE MIGRAINE WITHOUT AURA AND WITH STATUS MIGRAINOSUS: Primary | ICD-10-CM

## 2019-06-24 RX ORDER — DEXAMETHASONE 2 MG/1
2 TABLET ORAL
Qty: 5 TABLET | Refills: 0 | Status: SHIPPED | OUTPATIENT
Start: 2019-06-24 | End: 2019-06-29

## 2019-06-24 RX ORDER — PROCHLORPERAZINE MALEATE 10 MG
10 TABLET ORAL EVERY 6 HOURS PRN
Qty: 12 TABLET | Refills: 3 | Status: SHIPPED | OUTPATIENT
Start: 2019-06-24 | End: 2019-07-03

## 2019-06-25 PROBLEM — R11.0 NAUSEA: Status: ACTIVE | Noted: 2019-06-25

## 2019-06-25 RX ORDER — ONDANSETRON 8 MG/1
8 TABLET, ORALLY DISINTEGRATING ORAL EVERY 8 HOURS PRN
Qty: 20 TABLET | Refills: 0 | Status: SHIPPED | OUTPATIENT
Start: 2019-06-25 | End: 2022-06-15 | Stop reason: SDUPTHER

## 2019-07-01 ENCOUNTER — TELEPHONE (OUTPATIENT)
Dept: NEUROLOGY | Facility: CLINIC | Age: 39
End: 2019-07-01

## 2019-07-01 NOTE — TELEPHONE ENCOUNTER
Pt called stated she took her steroid last week and her migraine resolved  Reports that she woke up this morning with her migraine back  States she has bad anxiety and depression, so today she is very anxious that her migraine has returned  She took her zofran as she was also have nausea  She didn't take the compazine because it lowers BP and she already has low BP, states she took it once without side effects, but she would prefer not to take this  She continues on cymbalta 40 mg daily, topamax 50 mg BID, ativan 1 mg daily  She moved up her appt to Wednesday

## 2019-07-01 NOTE — TELEPHONE ENCOUNTER
Sorry to hear  If she wants we could try depakote 500 mg PO qhs for 3 nights, followed by 250 mg p o  Q h s  For 2 nights      - addendum 07/02/2019:   After reviewing her records she was not responsive to Depakote the past therefore will discuss with her tomorrow at follow-up

## 2019-07-02 NOTE — PROGRESS NOTES
[facility] Consult - Follow up   PATIENT:  Adán Ybarra  MRN:  [id number]  :  1980  DATE OF SERVICE:  2019  REFERRED BY: No ref  provider found  PMD: Fara Councilman, PA-C    Assessment/Plan:     Liliya Brown a 38 y  o  female with a past medical history of anxiety, depression, vitamin-D deficiency, herniated lumbar disc referred here for evaluation of headache/migraine  Initial visit 2018  Follow-up 2018, 2019, 2019     Chronic migraine without aura without status migrainosus, not intractable  Indomethacin responsive headache   She reports throughout her life she really only had headaches about once a month until 3 years ago at age 27 she started developing daily headaches and migraines   Migraines typically a few days prior to menses  Typically her headaches and migraines are right frontal/temporal/retro-orbital and sometimes go down the neck with associated symptoms of nausea, phonophobia, Osmophobia, lightheadedness and problems with concentration   - as of 18: 6-9 migraine days a month, chronic daily headache  - as of 2018: Topiramate has cut  chronic daily headaches in half  - As of 3/27/19: migraines 3-4 in a month, daily headaches are gone  - as of 2019:   At least 1 migraine a month, but lasting up to 1-2 weeks     Workup:  - MRI Brain with and without contrast 2018: Several small white matter hyperintensities are seen on T2 and FLAIR imaging within the frontal lobes without mass effect, diffusion abnormality or abnormal enhancement   These are nonspecific in a patient of this age and white matter lesions have been described within the frontal lobes in patients with chronic migraine headaches    - routine labs today to rule out other causes of symptoms and eval for any side effects from medications     Preventative:  - continue magnesium 400 mg a day, vitamin B2 400 daily and melatonin   - We discussed headache hygiene and lifestyle factors which may include improve her headaches including hydration, exercise, diet and weight loss     - We discussed starting an antidepressant which could help with her mood and headache prevention however she has been on multiple antidepressants in the past with side effects   -  increased topiramate gradually to 75 mg b i d  Discussed possible side effects and risks  - past:  Depakote may have made it worse, amitriptyline, gabapentin increased anxiety, Lexapro abuse prior for mood without effect on migraine, prazosin for PTSD, Vistaril, Lyrica caused SI  - For prevention could consider alternative medications such as amitriptyline,  propranolol or verapamil but blood pressure would likely not tolerate, Botox, or other newer migraine injectables CGRP - discussed today 7/3/19     Abortive:  - discussed not taking more than 3 days a week any abortive medication to avoid medication overuse/rebound  - continue Toradol 10 mg - although sometimes helps, sometimes doesn't  Discussed side effects and risks  - continue rizatriptan 10 mg as needed for abortive  Discussed proper use, possible side effects and risks  - past: Sumatriptan has worked a few times however most the time this makes her too sleepy to continue with her day   Depakote for 5 nights reportedly made her migraine wears  -  future options:  indomethacin 50 mg t i d  For 3 days - she thinks this knocked out/decreased in half daily headaches, dexamethasone 2 mg for 3-5 days that helped get rid of a migraine, could use again in future   - Other options to consider in the future for abortive include  Olanzapine      Vitamin-D deficiency  - Her primary care doctor is already repleting her vitamin-D which was recently found to be low at 18  5      - recheck in level today with other labs        Patient instructions     Gets labs downstairs today       For this unrelenting migraine/headache  (with indomethacin do not take any other NSAIDs such as ibuprofen, ketorolac/Toradol, naproxen/Aleve)  First we are going to try indomethacin 50 milligrams 3 times a day with meals with Carafate of approximately 30 minutes prior to protect her stomach  - do this for 3 days and if headache goes away you can stop if headache improved a little bit but not completely do all 5 days  - if needed, if this helps, we can use this as abortive medication - ideally no more than 3 times a day, 3 days a week  - if you need refills of this medication usually I do not fill earlier than a month just protect her stomach- but just contact me    Second option if after 3 days this does not work whatsoever, you can switch to dexamethasone 2 milligrams with breakfast for 3-5 days       Headache/migraine treatment:   Abortive medications (for immediate treatment of a headache): It is ok to take ibuprofen, acetaminophen or naproxen (Advil, Tylenol,  Aleve, Excedrin) if they help your headaches you should limit these to No more than 3 times a week to avoid medication overuse/rebound headaches       OK to take this as needed for the more moderate to severe migraines, do not take it with any ibuprofen or naproxen as the combination would be hard on your stomach  - ketorolac (TORADOL) 10 mg tablet; Take 1 tablet (10 mg total) by mouth daily as needed for severe pain Max 1/day, 3/week, 10/month  Do not use with other OTC pain meds - take with food      For your more moderate to severe migraines take this medication early   -   rizatriptan (AXERT) 10 MG tablet;  Take 1 tablet  by mouth once as needed for migraine for up to 1 dose may repeat in 2 hours if needed     Over the counter preventive supplements for headaches/migraines   (to take every day to help prevent headaches - not to take at the time of headache):  [x] Magnesium 400mg daily   [x] Riboflavin (Vitamin B2)  400mg daily   (FYI B2 may make your urine bright/neon yellow)     Prescription preventive medications for headaches/migraines   (to take every day to help prevent headaches - not to take at the time of headache):  [x] topiramate (TOPAMAX) increase to 50 mg in a m  and 75 mg in p m  For 1 week and then 75 mg in a m  And p m  Sleep/headache prevention:   [x] Melatonin - you may take 3 mg nightly for sleep  You should take this 1 hour prior to bedtime consistently every night for it to work  It works by gradually helping to adjust your sleep time over days to weeks, rather than immediately making you feel sleepy        Self-Monitoring:  [x] Headache calendar  Each day ray a number from 0-10 indicating if there was a headache and how bad it was   This can be used to monitor gradual improvement and is helpful to make medication adjustments  You can do this on paper or there is an WES for a smart phone called "Migraine e Diary"       Lifestyle Recommendations:  [x] SLEEP - Maintain a regular sleep schedule: Adults need at least 7-8 hours of uninterrupted a night  Maintain good sleep hygiene:  Going to bed and waking up at consistent times, avoiding excessive daytime naps, avoiding caffeinated beverages in the evening, avoid excessive stimulation in the evening and generally using bed primarily for sleeping   One hour before bedtime would recommend turning lights down lower, decreasing your activity (may read quietly, listen to music at a low volume)  When you get into bed, should eliminate all technology (no texting, emailing, playing with your phone, iPad or tablet in bed)  [x] HYDRATION - Maintain good hydration   Drink  2L of fluid a day (4 typical small water bottles)  [x] DIET - Maintain good nutrition  In particular don't skip meals and try and eat healthy balanced meals regularly  _Myfitnesspal is a great help for weight loss  [x] TRIGGERS - Look for other triggers and avoid them: Limit caffeine to 1-2 cups a day or less   Avoid dietary triggers that you have noticed bring on your headaches (this could include aged cheese, peanuts, MSG, aspartame and nitrates)      Education and Follow-up  [x] Please call with any questions or concerns  [x] Follow up 2-3 months with Dr Juhi Dobson and with Dr Piper Garces in the future            CC: We had the pleasure of evaluating Christi Sunshine neurological consultation today  she is a 38 y  o    right handed female who presents today for evaluation of headaches       History of Present Illness:   Interval history as of 07/03/2019:  - 05/10/2019:  Patient called in reporting migraines since 05/07  Throbbing dull right temporal, phonophobia  Nonresponsive to naproxen, Triptan x2, ketorolac/Toradol - sent Depakote for 5 to evenings to pharmacy  -05/12/2019 patient called in reporting on day 3 of Depakote without improvement  - 05/13/2019 migraine on relieved by Depakote, possibly worse - therefore sent dexamethasone 2 mg p o  Daily with breakfast for 5 days helped in the past and in this case  (had been on antibiotics then)    -06/24/2019:  Patient called with migraine starting 6/17 - throbbing right temple/retro-orbital and go down neck  nausea-nonresponsive to rizatriptan, ketorolac/Toradol, requesting med for nausea  Prescribe prochlorperazine - which she did not take as she was concerned about it lowering her blood pressure, then prescribed ondansetron  - called 07/01/2019 reporting dexamethasone resolved migraine but came back soon after worsening chronic anxiety and depression    - in the past 3 months migraines worsened,   One major one in may lasting a week, better with steroids  6/17 to now started with period, mild improvement with rizatriptan a little, reports took 1 compazine and did not help much  - indomethacin has worked in the past    - continues topiramate 50 mg b i d   Cymbalta/duloxetine 40 mg daily, Ativan 1 mg daily, taking the melatonin, rb, mg  She is wondering if this stopped     Interval history as of 03/27/2019  - 01/21/2019 refilled rizatriptan 10 mg, topiramate, Toradol 10 mg  - called in 03/07/2019 reporting migraine not responding to rizatriptan or Toradol - 2 mg dexamethasone worked  - has cut out diet soda which she used to take daily  - Daily headaches are gone  - rizatriptan does not seem to be working most of the time and knocks her out and migraine comes back the next day   - toradol does not help too much, but has been hesitant to take it   - migraines 3-4 times a month  - denies SE      Interval events as of 12/17/2018:   - has decreased daily OTC pain meds use, to once or twice in past 5 weeks   - topiramate has cut daily headaches in half  12/3/18: migraine treated with dexamethasone 1 mg daily for 3 days  - bad news yesterday, they get insurance through the state, topiramate will not be covered      What is your current pain level - 0  Headaches started at what age? Started regularly at 29 years old  How often do the headaches occur?   - as of 11/2018 - Past 3 years daily headache and migraines (Before that once a month)  - As of 12/17/18: Chronic daily headaches - have been cut in half  6-9 migraine days a month - in past 5 weeks has had 8 (11/20, 26, 12/1,2,3,4,15,16)  - As of 3/27/19: migraines 3-4 in a month, daily headaches are gone  - as of 07/03/2019:   At least 1 migraine a month, but lasting up to 1-2 weeks   What time of the day do the headaches start?   no particular time for migraines, headaches may wake up with them   How long do the headaches last?    3-4 days, as of 7/3/19 up to 1-2 weeks   Are you ever headache free?   No     Aura?  without aura    Has a prodrome where she can tell it is going to be a migraine by the intensity of which it comes on      Describe your usual headache pain quality?   dull, stabbing, throbbing and pressure - pounding the most  Where is your headache located?  Usually on the right frontal/temporal, when starting to go away goes to left frontal - these are every day headaches    Migraines - usually just on the right frontal/temporal, sometimes right occipital    - throbbing right temple/retro-orbital and go down neck  Does the pain Radiate?  no radiation of pain  What is the intensity of pain?  Normal 5, migraines 7   Associated symptoms:   [x] Nausea       [] Vomiting        [] Diarrhea         [] Insomnia           [] Stiff or sore neck          [x] Problems with concentration  [] Photophobia      [x]Phonophobia      [x] Osmophobia  [] Blurred vision   [x] Prefer quiet, dark room        [x] Light-headed or dizzy    [] Tinnitus      [] Red ear      [] Ptosis      [] Facial droop  [] Lacrimation  [] Nasal congestion/rhinorrhea   [] Flushing of face         [] Change in pupil size  [] Hands or feet tingle or feel numb/paresthesias       Number of days missed per month because of headaches:  Work days: has to push through and  work  Social or Family activities:  frequently       Things that make the headache worse?  Movement - with migraine, coughing, sneezing, bending over,      Headache triggers:   stress, change in eating pattern, menses, weather changes   What time of the year do headaches occur more frequently?   do not seem to be related to any time of the year      Have you seen someone else for headaches or pain? No - pain doctor in the past for herniated disc in back - had cortisone injection and did not work, surgery helped   Have you had trigger point injection performed and how often? No  Have you had Botox injection performed and how often? No   Have you had epidural injections or transforaminal injections performed? No  Have you used CBD or THC for your headaches and how often?  No  Are you current pregnant or planning on getting pregnant?  No, husbands is fixed   Have you ever had any Brain imaging? Yes, MRI Brain      What medications do you take or have you taken for your headaches?    ABORTIVE:   - OTC pain meds did not help - does not use more than 3 days per week  - rizatriptan helps a little, but too tired   - toradol 10 mg PO  started 12/17/18 - not helping much     Decadron 2 mg for 3-5 days has helped migraine  Indomethacin 50 mg t i d  For 3 days decreased chronic daily headache     Past:  Toradol IM did not help  Sumatriptan - put her to sleep    PREVENTIVE:   -topiramate 50 mg b i d  - increasing today to 75 mg BID    Mg, b2   Vit D      Past meds:  - amitriptyline - does not remember clearly, went off when pregnant   - Gabapentin in past  - increased anxiety   - depakote for 5 days worsened migraine she thinks  - lexapro and buspar for mood - no side effects, stopped after a few years  - prazosin the past for PTSD/nightmares  - Vistaril 25 mg in past  - Gabapentin in past   - lyrica in past made her have SI      Alternative therapies used in the past for headaches? no other headache interventions have been tried     Neck pain?: No     LIFESTYLE  Sleep - averages 7-8 hours     Is your sleep restful?  Yes, 50/50  How often do you get up at night?  Wakes up about once half the nights   Do you wake up with headaches? Yes  Do you snore while asleep? No  Have you been told that you stop breathing during sleeping? No  Do you wake up tired in the morning? Yes  Do you take frequent naps during the day? Yes, used to every day, now cant because of work schedule      Physical activity: none   Water: 100 ounzes        Mood: stable  History of Bad anxiety and depression  - following with psychiatrist     Takes:   Ativan 0 5 mg once in the am   cymbalta 20 mg daily - started about a year ago, weaning down     In the past:  - lexapro buspar for the longest - helped depression  prazosin the past for PTSD/nightmares  Vistaril 25 mg in past  Gabapentin in past   lyrica in past made her have SI   wellbutrin made her anxiety worse     The following portions of the patient's history were reviewed and updated as appropriate: allergies, current medications, past family history, past medical history, past social history, past surgical history and problem list      Work: Education:  Instructional school assistant  Education: Associates degree  Instructional school assistant  Lives with  marlon, son Esdras Castellano, daughter [de-identified]   13year old boy and 15year old daughter   Lost other son to cancer 10 years ago - neuroblastoma      Past Medical History:     Past Medical History:   Diagnosis Date    Chickenpox        Patient Active Problem List   Diagnosis    Anxiety and depression    Dietary calcium deficiency    Lumbar herniated disc    Vitamin D deficiency    Intractable migraine without aura and with status migrainosus    Chronic daily headache    Chronic migraine without aura without status migrainosus, not intractable    Nausea       Medications:      Current Outpatient Medications   Medication Sig Dispense Refill    almotriptan (AXERT) 6 25 MG tablet Take 1 tablet (6 25 mg total) by mouth once as needed for migraine for up to 1 dose may repeat in 2 hours if needed 6 tablet 3    Cholecalciferol (VITAMIN D3) 2000 units TABS Take by mouth      divalproex sodium (DEPAKOTE) 250 mg EC tablet Depakote 500 mg PO QHS for 3 days then 250 mg QHS for 2 days then stop  8 tablet 0    DULoxetine (CYMBALTA) 20 mg capsule 40 mg daily      fluticasone (FLONASE) 50 mcg/act nasal spray 1 spray into each nostril daily 1 Bottle 0    ketorolac (TORADOL) 10 mg tablet Take 1 tablet (10 mg total) by mouth daily as needed for severe pain Max 1/day, 3/week, 10/month   Do not use with other OTC pain meds 10 tablet 0    LORazepam (ATIVAN) 1 mg tablet Take by mouth      Magnesium Oxide -Mg Supplement 400 MG CAPS Take by mouth      Melatonin 3 MG CAPS Take 3 mg by mouth      ondansetron (ZOFRAN-ODT) 8 mg disintegrating tablet Take 1 tablet (8 mg total) by mouth every 8 (eight) hours as needed for nausea or vomiting 20 tablet 0    prochlorperazine (COMPAZINE) 10 mg tablet Take 1 tablet (10 mg total) by mouth every 6 (six) hours as needed (migraine with or without nausea) 12 tablet 3    Riboflavin (B2 PO) Take by mouth      topiramate (TOPAMAX) 25 mg tablet 1 tab PO QHS for 1 week, increase as tolerated to 1 tab BID for 1 week, then 1 tab QAM and 2 tabs QHS for 1 week and finish at 2 tabs BID  120 tablet 3     No current facility-administered medications for this visit  Allergies:       Allergies   Allergen Reactions    Lyrica [Pregabalin]      Other reaction(s): Suicidal ideation       Family History:     Family History   Problem Relation Age of Onset    Hypertension Father     Breast cancer Maternal Grandmother     Stroke Maternal Grandmother     Cancer Maternal Grandfather         with unknown primary sit     Breast cancer Paternal Grandmother     Other Son         Neuroblastoma of abdomen    Breast cancer Maternal Aunt     Ovarian cancer Maternal Aunt         at age 28    Breast cancer Paternal Aunt         dx at age 52       Social History:     Social History     Socioeconomic History    Marital status: /Civil Union     Spouse name: Not on file    Number of children: Not on file    Years of education: associates degree    Highest education level: Not on file   Occupational History    Occupation: Patent processor   Social Needs    Financial resource strain: Not on file    Food insecurity:     Worry: Not on file     Inability: Not on file    Transportation needs:     Medical: Not on file     Non-medical: Not on file   Tobacco Use    Smoking status: Never Smoker    Smokeless tobacco: Never Used   Substance and Sexual Activity    Alcohol use: No    Drug use: No    Sexual activity: Not on file   Lifestyle    Physical activity:     Days per week: Not on file     Minutes per session: Not on file    Stress: Not on file   Relationships    Social connections:     Talks on phone: Not on file     Gets together: Not on file     Attends Christian service: Not on file     Active member of club or organization: Not on file Attends meetings of clubs or organizations: Not on file     Relationship status: Not on file    Intimate partner violence:     Fear of current or ex partner: Not on file     Emotionally abused: Not on file     Physically abused: Not on file     Forced sexual activity: Not on file   Other Topics Concern    Not on file   Social History Narrative    Lack of exercise     Hinduism affiliation - non Hindu [employer]    Hobbies: reading, bike rides, volunteering    Employed:          Objective:     Physical Exam:                                                                 Vitals:            Constitutional:    /70 (BP Location: Left arm, Patient Position: Sitting, Cuff Size: Adult)   Pulse 70   Resp 14   Ht 5' 4" (1 626 m)   Wt 92 9 kg (204 lb 11 2 oz)   BMI 35 14 kg/m²   BP Readings from Last 3 Encounters:   07/03/19 100/70   04/24/19 119/75   03/27/19 108/72     Pulse Readings from Last 3 Encounters:   07/03/19 70   04/24/19 62   03/27/19 (!) 20         Well developed, well nourished, well groomed  No dysmorphic features  HEENT:  Normocephalic atraumatic  No meningismus  Oropharynx is clear and moist  No oral mucosal lesions  Chest:  Respirations regular and unlabored  Cardiovascular:  Regular rate, intact distal pulses  Distal extremities warm without palpable edema or tenderness, no observed significant swelling  Musculoskeletal:  Full range of motion  (see below under neurologic exam for evaluation of motor function and gait)   Skin:  warm and dry, not diaphoretic  No apparent birthmarks or stigmata of neurocutaneous disease  Psychiatric:  Normal behavior and appropriate affect, a little down due to being in pain       Neurological Examination:     Mental status/cognitive function:   Orientated to time, place and person  Recent and remote memory intact  Attention span and concentration as well as fund of knowledge are appropriate for age   Normal language and spontaneous speech  Cranial Nerves:  III, IV, VI-Pupils were equal, round, and reactive to light  Extraocular movements were full and conjugate without nystagmus  VII-facial expression symmetric, intact forehead wrinkle, strong eye closure, symmetric smile    VIII-hearing grossly intact bilaterally   Motor Exam: symmetric bulk throughout  no atrophy, fasciculations or abnormal movements noted  Coordination:  no apparent dysmetria, ataxia or tremor noted  Gait: steady casual gait         Pertinent lab results:   10/2018 Vit D 18 3 - replacement started     11/13/2018:  CMP within normal limits  B12 393  TSH 0 954     Imaging:   [facility] 10/29/18:  Personally reviewed and unremarkable agree with radiology read of normal      MRI Brain with and without contrast 11/24/2018  Several small white matter hyperintensities are seen on T2 and FLAIR imaging within the frontal lobes without mass effect, diffusion abnormality or abnormal enhancement   These are nonspecific in a patient of this age and white matter lesions have been described within the frontal lobes in patients with chronic migraine headaches            Review of Systems:   ZEUS Personally reviewed  Review of Systems   Constitutional: Negative  Negative for appetite change and fever  HENT: Negative  Negative for hearing loss, tinnitus, trouble swallowing and voice change  Eyes: Negative  Negative for photophobia and pain  Respiratory: Negative  Negative for shortness of breath  Cardiovascular: Negative  Negative for palpitations  Gastrointestinal: Negative  Negative for nausea and vomiting  Endocrine: Negative  Negative for cold intolerance and heat intolerance  Genitourinary: Negative  Negative for dysuria, frequency and urgency  Musculoskeletal: Negative  Negative for myalgias and neck pain  Skin: Negative  Negative for rash  Allergic/Immunologic: Negative  Neurological: Positive for headaches   Negative for dizziness, tremors, seizures, syncope, facial asymmetry, speech difficulty, weakness, light-headedness and numbness  Concentration issues with migraines   Hematological: Negative  Does not bruise/bleed easily  Psychiatric/Behavioral: Negative  Negative for confusion, hallucinations and sleep disturbance  I have spent 40 minutes with Patient  today in which greater than 50% of this time was spent in counseling/coordination of care regarding Prognosis, Risks and benefits of tx options, Intructions for management, Importance of tx compliance, Risk factor reductions and Impressions        Author:  Yojana Jimenez MD 7/2/2019 10:59 AM

## 2019-07-03 ENCOUNTER — OFFICE VISIT (OUTPATIENT)
Dept: NEUROLOGY | Facility: CLINIC | Age: 39
End: 2019-07-03
Payer: COMMERCIAL

## 2019-07-03 VITALS
BODY MASS INDEX: 34.95 KG/M2 | RESPIRATION RATE: 14 BRPM | DIASTOLIC BLOOD PRESSURE: 70 MMHG | SYSTOLIC BLOOD PRESSURE: 100 MMHG | WEIGHT: 204.7 LBS | HEART RATE: 70 BPM | HEIGHT: 64 IN

## 2019-07-03 DIAGNOSIS — E55.9 VITAMIN D DEFICIENCY: ICD-10-CM

## 2019-07-03 DIAGNOSIS — G43.709 CHRONIC MIGRAINE WITHOUT AURA WITHOUT STATUS MIGRAINOSUS, NOT INTRACTABLE: Primary | ICD-10-CM

## 2019-07-03 DIAGNOSIS — G43.011 INTRACTABLE MIGRAINE WITHOUT AURA AND WITH STATUS MIGRAINOSUS: ICD-10-CM

## 2019-07-03 PROCEDURE — 99215 OFFICE O/P EST HI 40 MIN: CPT | Performed by: PSYCHIATRY & NEUROLOGY

## 2019-07-03 RX ORDER — TOPIRAMATE 25 MG/1
TABLET ORAL
Qty: 180 TABLET | Refills: 3 | Status: SHIPPED | OUTPATIENT
Start: 2019-07-03 | End: 2019-10-24 | Stop reason: SDUPTHER

## 2019-07-03 RX ORDER — DEXAMETHASONE 2 MG/1
2 TABLET ORAL
Qty: 5 TABLET | Refills: 0 | Status: SHIPPED | OUTPATIENT
Start: 2019-07-03 | End: 2019-07-08

## 2019-07-03 RX ORDER — PREGABALIN 75 MG/1
CAPSULE ORAL
COMMUNITY
Start: 2015-01-29 | End: 2019-07-03

## 2019-07-03 RX ORDER — RIZATRIPTAN BENZOATE 10 MG/1
TABLET ORAL
COMMUNITY
Start: 2019-06-16 | End: 2022-03-24

## 2019-07-03 RX ORDER — SUCRALFATE 1 G/1
TABLET ORAL
Qty: 20 TABLET | Refills: 0 | Status: SHIPPED | OUTPATIENT
Start: 2019-07-03 | End: 2019-08-16 | Stop reason: SDUPTHER

## 2019-07-03 RX ORDER — INDOMETHACIN 50 MG/1
50 CAPSULE ORAL 3 TIMES DAILY PRN
Qty: 20 CAPSULE | Refills: 0 | Status: SHIPPED | OUTPATIENT
Start: 2019-07-03 | End: 2019-08-16 | Stop reason: SDUPTHER

## 2019-07-03 RX ORDER — DULOXETIN HYDROCHLORIDE 30 MG/1
CAPSULE, DELAYED RELEASE ORAL
COMMUNITY
End: 2019-12-30

## 2019-07-03 RX ORDER — SUMATRIPTAN 50 MG/1
TABLET, FILM COATED ORAL EVERY 12 HOURS
COMMUNITY
End: 2019-07-03

## 2019-07-03 NOTE — PROGRESS NOTES
Review of Systems   Constitutional: Negative  Negative for appetite change and fever  HENT: Negative  Negative for hearing loss, tinnitus, trouble swallowing and voice change  Eyes: Negative  Negative for photophobia and pain  Respiratory: Negative  Negative for shortness of breath  Cardiovascular: Negative  Negative for palpitations  Gastrointestinal: Negative  Negative for nausea and vomiting  Endocrine: Negative  Negative for cold intolerance and heat intolerance  Genitourinary: Negative  Negative for dysuria, frequency and urgency  Musculoskeletal: Negative  Negative for myalgias and neck pain  Skin: Negative  Negative for rash  Allergic/Immunologic: Negative  Neurological: Positive for headaches  Negative for dizziness, tremors, seizures, syncope, facial asymmetry, speech difficulty, weakness, light-headedness and numbness  Concentration issues   Hematological: Negative  Does not bruise/bleed easily  Psychiatric/Behavioral: Negative  Negative for confusion, hallucinations and sleep disturbance

## 2019-07-03 NOTE — PATIENT INSTRUCTIONS
Gets labs downstairs today       For this unrelenting migraine/headache  (with indomethacin do not take any other NSAIDs such as ibuprofen, ketorolac/Toradol, naproxen/Aleve)  First we are going to try indomethacin 50 milligrams 3 times a day with meals with Carafate of approximately 30 minutes prior to protect her stomach  - do this for 3 days and if headache goes away you can stop if headache improved a little bit but not completely do all 5 days  - if needed, if this helps, we can use this as abortive medication - ideally no more than 3 times a day, 3 days a week  - if you need refills of this medication usually I do not fill earlier than a month just protect her stomach- but just contact me    Second option if after 3 days this does not work whatsoever, you can switch to dexamethasone 2 milligrams with breakfast for 3-5 days       Headache/migraine treatment:   Abortive medications (for immediate treatment of a headache): It is ok to take ibuprofen, acetaminophen or naproxen (Advil, Tylenol,  Aleve, Excedrin) if they help your headaches you should limit these to No more than 3 times a week to avoid medication overuse/rebound headaches       OK to take this as needed for the more moderate to severe migraines, do not take it with any ibuprofen or naproxen as the combination would be hard on your stomach  - ketorolac (TORADOL) 10 mg tablet; Take 1 tablet (10 mg total) by mouth daily as needed for severe pain Max 1/day, 3/week, 10/month  Do not use with other OTC pain meds - take with food      For your more moderate to severe migraines take this medication early   -   rizatriptan (AXERT) 10 MG tablet;  Take 1 tablet  by mouth once as needed for migraine for up to 1 dose may repeat in 2 hours if needed     Over the counter preventive supplements for headaches/migraines   (to take every day to help prevent headaches - not to take at the time of headache):  [x] Magnesium 400mg daily   [x] Riboflavin (Vitamin B2)  400mg daily   (FYI B2 may make your urine bright/neon yellow)     Prescription preventive medications for headaches/migraines   (to take every day to help prevent headaches - not to take at the time of headache):  [x] topiramate (TOPAMAX) increase to 50 mg in a m  and 75 mg in p m  For 1 week and then 75 mg in a m  And p m  Sleep/headache prevention:   [x] Melatonin - you may take 3 mg nightly for sleep  You should take this 1 hour prior to bedtime consistently every night for it to work  It works by gradually helping to adjust your sleep time over days to weeks, rather than immediately making you feel sleepy        Self-Monitoring:  [x] Headache calendar  Each day ray a number from 0-10 indicating if there was a headache and how bad it was   This can be used to monitor gradual improvement and is helpful to make medication adjustments  You can do this on paper or there is an WES for a smart phone called "Migraine e Diary"       Lifestyle Recommendations:  [x] SLEEP - Maintain a regular sleep schedule: Adults need at least 7-8 hours of uninterrupted a night  Maintain good sleep hygiene:  Going to bed and waking up at consistent times, avoiding excessive daytime naps, avoiding caffeinated beverages in the evening, avoid excessive stimulation in the evening and generally using bed primarily for sleeping   One hour before bedtime would recommend turning lights down lower, decreasing your activity (may read quietly, listen to music at a low volume)  When you get into bed, should eliminate all technology (no texting, emailing, playing with your phone, iPad or tablet in bed)  [x] HYDRATION - Maintain good hydration   Drink  2L of fluid a day (4 typical small water bottles)  [x] DIET - Maintain good nutrition  In particular don't skip meals and try and eat healthy balanced meals regularly    _Myfitnesspal is a great help for weight loss  [x] TRIGGERS - Look for other triggers and avoid them: Limit caffeine to 1-2 cups a day or less  Avoid dietary triggers that you have noticed bring on your headaches (this could include aged cheese, peanuts, MSG, aspartame and nitrates)      Education and Follow-up  [x] Please call with any questions or concerns     [x] Follow up 2-3 months with Dr Deana Portillo and with Dr Karthikeyan Do in the future

## 2019-07-30 ENCOUNTER — LAB (OUTPATIENT)
Dept: LAB | Facility: HOSPITAL | Age: 39
End: 2019-07-30
Attending: PSYCHIATRY & NEUROLOGY
Payer: COMMERCIAL

## 2019-07-30 DIAGNOSIS — E55.9 VITAMIN D DEFICIENCY: ICD-10-CM

## 2019-07-30 DIAGNOSIS — G43.709 CHRONIC MIGRAINE WITHOUT AURA WITHOUT STATUS MIGRAINOSUS, NOT INTRACTABLE: ICD-10-CM

## 2019-07-30 LAB
25(OH)D3 SERPL-MCNC: 45.5 NG/ML (ref 30–100)
ALBUMIN SERPL BCP-MCNC: 3.9 G/DL (ref 3.5–5)
ALP SERPL-CCNC: 55 U/L (ref 46–116)
ALT SERPL W P-5'-P-CCNC: 25 U/L (ref 12–78)
ANION GAP SERPL CALCULATED.3IONS-SCNC: 7 MMOL/L (ref 4–13)
AST SERPL W P-5'-P-CCNC: 32 U/L (ref 5–45)
BASOPHILS # BLD AUTO: 0.09 THOUSANDS/ΜL (ref 0–0.1)
BASOPHILS NFR BLD AUTO: 2 % (ref 0–1)
BILIRUB SERPL-MCNC: 0.29 MG/DL (ref 0.2–1)
BUN SERPL-MCNC: 8 MG/DL (ref 5–25)
CALCIUM SERPL-MCNC: 8.8 MG/DL (ref 8.3–10.1)
CHLORIDE SERPL-SCNC: 111 MMOL/L (ref 100–108)
CO2 SERPL-SCNC: 21 MMOL/L (ref 21–32)
CREAT SERPL-MCNC: 0.78 MG/DL (ref 0.6–1.3)
EOSINOPHIL # BLD AUTO: 0.21 THOUSAND/ΜL (ref 0–0.61)
EOSINOPHIL NFR BLD AUTO: 5 % (ref 0–6)
ERYTHROCYTE [DISTWIDTH] IN BLOOD BY AUTOMATED COUNT: 12.6 % (ref 11.6–15.1)
GFR SERPL CREATININE-BSD FRML MDRD: 96 ML/MIN/1.73SQ M
GLUCOSE P FAST SERPL-MCNC: 83 MG/DL (ref 65–99)
HCT VFR BLD AUTO: 43.7 % (ref 34.8–46.1)
HGB BLD-MCNC: 14.3 G/DL (ref 11.5–15.4)
IMM GRANULOCYTES # BLD AUTO: 0.01 THOUSAND/UL (ref 0–0.2)
IMM GRANULOCYTES NFR BLD AUTO: 0 % (ref 0–2)
LYMPHOCYTES # BLD AUTO: 1.78 THOUSANDS/ΜL (ref 0.6–4.47)
LYMPHOCYTES NFR BLD AUTO: 38 % (ref 14–44)
MCH RBC QN AUTO: 28.4 PG (ref 26.8–34.3)
MCHC RBC AUTO-ENTMCNC: 32.7 G/DL (ref 31.4–37.4)
MCV RBC AUTO: 87 FL (ref 82–98)
MONOCYTES # BLD AUTO: 0.48 THOUSAND/ΜL (ref 0.17–1.22)
MONOCYTES NFR BLD AUTO: 10 % (ref 4–12)
NEUTROPHILS # BLD AUTO: 2.13 THOUSANDS/ΜL (ref 1.85–7.62)
NEUTS SEG NFR BLD AUTO: 45 % (ref 43–75)
NRBC BLD AUTO-RTO: 0 /100 WBCS
PLATELET # BLD AUTO: 221 THOUSANDS/UL (ref 149–390)
PMV BLD AUTO: 11.6 FL (ref 8.9–12.7)
POTASSIUM SERPL-SCNC: 4.3 MMOL/L (ref 3.5–5.3)
PROT SERPL-MCNC: 7.3 G/DL (ref 6.4–8.2)
RBC # BLD AUTO: 5.04 MILLION/UL (ref 3.81–5.12)
SODIUM SERPL-SCNC: 139 MMOL/L (ref 136–145)
TSH SERPL DL<=0.05 MIU/L-ACNC: 1.44 UIU/ML (ref 0.36–3.74)
VIT B12 SERPL-MCNC: 453 PG/ML (ref 100–900)
WBC # BLD AUTO: 4.7 THOUSAND/UL (ref 4.31–10.16)

## 2019-07-30 PROCEDURE — 82607 VITAMIN B-12: CPT

## 2019-07-30 PROCEDURE — 85025 COMPLETE CBC W/AUTO DIFF WBC: CPT

## 2019-07-30 PROCEDURE — 36415 COLL VENOUS BLD VENIPUNCTURE: CPT

## 2019-07-30 PROCEDURE — 80053 COMPREHEN METABOLIC PANEL: CPT

## 2019-07-30 PROCEDURE — 84443 ASSAY THYROID STIM HORMONE: CPT

## 2019-07-30 PROCEDURE — 82306 VITAMIN D 25 HYDROXY: CPT

## 2019-07-30 NOTE — RESULT ENCOUNTER NOTE
Raheel Faye,     Labs look great    Normal B12, thyroid function, vitamin-D  The slightly high chloride is a common finding likely of no significance, but ok to discuss with your primary provider if you would like

## 2019-08-16 DIAGNOSIS — G43.709 CHRONIC MIGRAINE WITHOUT AURA WITHOUT STATUS MIGRAINOSUS, NOT INTRACTABLE: ICD-10-CM

## 2019-08-16 RX ORDER — SUCRALFATE 1 G/1
TABLET ORAL
Qty: 20 TABLET | Refills: 0 | OUTPATIENT
Start: 2019-08-16

## 2019-08-16 RX ORDER — INDOMETHACIN 50 MG/1
CAPSULE ORAL
Qty: 20 CAPSULE | Refills: 0 | Status: SHIPPED | OUTPATIENT
Start: 2019-08-16 | End: 2019-10-14

## 2019-08-16 RX ORDER — INDOMETHACIN 50 MG/1
50 CAPSULE ORAL 3 TIMES DAILY PRN
Qty: 20 CAPSULE | Refills: 0 | OUTPATIENT
Start: 2019-08-16

## 2019-08-16 RX ORDER — SUCRALFATE 1 G/1
TABLET ORAL
Qty: 20 TABLET | Refills: 0 | Status: SHIPPED | OUTPATIENT
Start: 2019-08-16 | End: 2019-10-14 | Stop reason: SDUPTHER

## 2019-08-21 DIAGNOSIS — G43.709 CHRONIC MIGRAINE WITHOUT AURA WITHOUT STATUS MIGRAINOSUS, NOT INTRACTABLE: ICD-10-CM

## 2019-08-21 RX ORDER — TOPIRAMATE 25 MG/1
TABLET ORAL
Qty: 360 TABLET | Refills: 0 | OUTPATIENT
Start: 2019-08-21

## 2019-09-09 ENCOUNTER — TELEPHONE (OUTPATIENT)
Dept: NEUROLOGY | Facility: CLINIC | Age: 39
End: 2019-09-09

## 2019-10-11 NOTE — PROGRESS NOTES
[address] [facility] Consult - Follow up   PATIENT:  Zoe Loco  MRN:  [id number]  :  1980  DATE OF SERVICE:  10/14/2019  REFERRED BY: No ref  provider found  PMD: Lisette Pandey PA-C    Assessment/Plan:   Marcia Romberg a 38 y  o  female with a past medical history of anxiety, depression, vitamin-D deficiency, herniated lumbar disc referred here for evaluation of headache/migraine    Initial visit 2018  Follow-up 2018, 2019, 2019, 10/14/2019     Chronic migraine without aura without status migrainosus, not intractable  Indomethacin responsive headache   She reports throughout her life she really only had headaches about once a month until 3 years ago at age 27 she started developing daily headaches and migraines  Raúl Adam typically a few days prior to menses  Typically her headaches and migraines are right frontal/temporal/retro-orbital and sometimes go down the neck with associated symptoms of nausea, phonophobia, Osmophobia, lightheadedness and problems with concentration   - as of 18: 6-9 migraine days a month, chronic daily headache  - as of 2018:  Topiramate has cut  chronic daily headaches in half  - As of 3/27/19: migraines 3-4 in a month, daily headaches are gone  - as of 2019:   At least 1 migraine a month, but lasting up to 1-2 weeks  - as of 10/14/2019: headaches 15/30 days, 1 week migraine a month     Workup:  - MRI Brain with and without contrast 2018: Several small white matter hyperintensities are seen on T2 and FLAIR imaging within the frontal lobes without mass effect, diffusion abnormality or abnormal enhancement   These are nonspecific in a patient of this age and white matter lesions have been described within the frontal lobes in patients with chronic migraine headaches       Preventative:  - continue magnesium 400 mg a day, vitamin B2 400 daily and melatonin   - We discussed headache hygiene and lifestyle factors which may include improve her headaches including hydration, exercise, diet and weight loss     - We discussed starting an antidepressant which could help with her mood and headache prevention however she has been on multiple antidepressants in the past with side effects   - decrease topiramate gradually to 50 mg b i d  may decrease further depending on response to CGRP Discussed possible side effects and risks  - trial of emgality  Discussed proper use, possible side effects and risks  - past:  topiramate has not helped sufficiently, Depakote  made it worse, amitriptyline, gabapentin increased anxiety, Lexapro use prior for mood without effect on migraine, prazosin for PTSD, Vistaril, Lyrica caused SI, cymbalta helped mood but caused headaches, SI from lamotrigine, seroquel SE, Buspar, blood pressure is consistently low and would not tolerate BP med     Abortive:  - discussed not taking more than 3 days a week any abortive medication to avoid medication overuse/rebound  - indomethacin 50 mg with crafate prior  Discussed proper use, possible side effects and risks  - continue rizatriptan 10 mg as needed for abortive  (sometimes works)  Discussed proper use, possible side effects and risks  - past: toradol IM and PO did not help, Sumatriptan has worked a few times however most the time this makes her too sleepy to continue with her day   Depakote for 5 nights reportedly made her migraine worse  -  future options:  indomethacin 50 mg t i d   For 3 days - she thinks this knocked out/decreased in half daily headaches, dexamethasone 2 mg for 3-5 days that helped get rid of a migraine, could use again in future   - Other options to consider in the future for abortive include  Olanzapine      Vitamin-D deficiency  - Her primary care doctor is already repleting her vitamin-D         Patient instructions        Headache/migraine treatment:   Abortive medications (for immediate treatment of a headache): It is ok to take ibuprofen, acetaminophen or naproxen (Advil, Tylenol,  Aleve, Excedrin) if they help your headaches you should limit these to No more than 3 times a week to avoid medication overuse/rebound headaches      To try and stop this headache- dexamethasone 2 mg with breakfast for 3-5 days    (with indomethacin do not take any other NSAIDs such as ibuprofen, ketorolac/Toradol, naproxen/Aleve)  #  indomethacin 50 mg up to 3 times a day with meals or Carafate approximately 30 minutes prior to protect her stomach   - no more than 3 days per week   - if you need refills of this medication usually I do not fill earlier than a month just just contact me     For your more moderate to severe migraines take this medication early   -   rizatriptan (AXERT) 10 MG tablet; Take 1 tablet  by mouth once as needed for migraine for up to 1 dose may repeat in 2 hours if needed     Over the counter preventive supplements for headaches/migraines   (to take every day to help prevent headaches - not to take at the time of headache):  [x] Magnesium 400mg daily   [x] Riboflavin (Vitamin B2)  400mg daily   (FYI B2 may make your urine bright/neon yellow)     Prescription preventive medications for headaches/migraines   (to take every day to help prevent headaches - not to take at the time of headache):  [x] topiramate (TOPAMAX) to 50 mg in am and 75 mg in pm for 1 week, then 50 mg twice a day and continue  If you have significant improvement with the injectable medication, may decrease further     [x] trial of emgality - 2 pens the first month, 1 pen every month after     [x] when you discuss with your psychiatrist see if venlafaxine is an option (rather than lexapro)     Sleep/headache prevention:   [x] Melatonin - you may take 3 mg nightly for sleep  You should take this 1 hour prior to bedtime consistently every night for it to work   It works by gradually helping to adjust your sleep time over days to weeks, rather than immediately making you feel sleepy        Self-Monitoring:  [x] Headache calendar  Each day ray a number from 0-10 indicating if there was a headache and how bad it was   This can be used to monitor gradual improvement and is helpful to make medication adjustments  You can do this on paper or there is an WES for a smart phone called "Migraine e Diary"       Lifestyle Recommendations:  [x] SLEEP - Maintain a regular sleep schedule: Adults need at least 7-8 hours of uninterrupted a night  Maintain good sleep hygiene:  Going to bed and waking up at consistent times, avoiding excessive daytime naps, avoiding caffeinated beverages in the evening, avoid excessive stimulation in the evening and generally using bed primarily for sleeping   One hour before bedtime would recommend turning lights down lower, decreasing your activity (may read quietly, listen to music at a low volume)  When you get into bed, should eliminate all technology (no texting, emailing, playing with your phone, iPad or tablet in bed)  [x] HYDRATION - Maintain good hydration   Drink  2L of fluid a day (4 typical small water bottles)  [x] DIET - Maintain good nutrition  In particular don't skip meals and try and eat healthy balanced meals regularly  _Myfitnesspal is a great help for weight loss  [x] TRIGGERS - Look for other triggers and avoid them: Limit caffeine to 1-2 cups a day or less  Avoid dietary triggers that you have noticed bring on your headaches (this could include aged cheese, peanuts, MSG, aspartame and nitrates)      Education and Follow-up  [x] Please call with any questions or concerns  [x] Follow up 2 months with Dr Kriss Miller and with Dr Santiago Villa as scheduled 2/14/19           CC: We had the pleasure of evaluating Denver Adamaris neurological consultation today  she is a 38 y  o    right handed female who presents today for evaluation of headaches       History of Present Illness:   Interval history as of 10/14/2019  - 7/30/2019: CMP and CBC unremarkable  Vitamin-D 45 5, B12 453, TSH 1 4    Mood/Psych  - over the summer was on lamictal for 2 months since last visit through psychiatry  Was ok on 25 mg and then increased to 50 mg - had suicidal thoughts - no longer had those  - seroquel started - unknown dose - woke up in the middle of night and felt very dizzy (seroquel, topiramate, lexapro)  - has discussed with psychiatrist     Headaches/migraines  - last visit increased topiramate 50 mg b i d  Gradually to 75 mg b i d  - feels good on it  - now only getting migraines right before or during period   - indomethacin was working and  last too times had a panic attck  - rizatriptan does not seem to be working most of the time and knocks her out and migraine comes back the next day     - taking lexapro   - taking the melatonin, rb, mg      Interval history as of 07/03/2019:  - 05/10/2019:  Patient called in reporting migraines since 05/07  Throbbing dull right temporal, phonophobia  Nonresponsive to naproxen, Triptan x2, ketorolac/Toradol - sent Depakote for 5 to evenings to pharmacy  -05/12/2019 patient called in reporting on day 3 of Depakote without improvement  - 05/13/2019 migraine not relieved by Depakote, possibly worse - therefore sent dexamethasone 2 mg p o  Daily with breakfast for 5 days helped in the past and in this case  (had been on antibiotics then)     -06/24/2019:  Patient called with migraine starting 6/17 - throbbing right temple/retro-orbital and go down neck  nausea-nonresponsive to rizatriptan, ketorolac/Toradol, requesting med for nausea  Prescribe prochlorperazine - which she did not take as she was concerned about it lowering her blood pressure, then prescribed ondansetron  - called 07/01/2019 reporting dexamethasone resolved migraine but came back soon after worsening chronic anxiety and depression     - in the past 3 months migraines worsened,   One major one in may lasting a week, better with steroids   6/17 to now started with period, mild improvement with rizatriptan a little, reports took 1 compazine and did not help much  - indomethacin has worked in the past     - continues topiramate 50 mg b i d  Cymbalta/duloxetine 40 mg daily, Ativan 1 mg daily, taking the melatonin, rb, mg  She is wondering if this stopped working     Interval history as of 03/27/2019  - 01/21/2019 refilled rizatriptan 10 mg, topiramate, Toradol 10 mg  - called in 03/07/2019 reporting migraine not responding to rizatriptan or Toradol - 2 mg dexamethasone worked  - has cut out diet soda which she used to take daily  - Daily headaches are gone  - rizatriptan does not seem to be working most of the time and knocks her out and migraine comes back the next day   - toradol does not help too much, but has been hesitant to take it   - migraines 3-4 times a month  - denies SE      Interval events as of 12/17/2018:   - has decreased daily OTC pain meds use, to once or twice in past 5 weeks   - topiramate has cut daily headaches in half  12/3/18: migraine treated with dexamethasone 1 mg daily for 3 days  - bad news yesterday, they get insurance through the state, topiramate will not be covered        Headaches started at what age? Started regularly at 29 years old  How often do the headaches occur?   - as of 11/2018 - Past 3 years daily headache and migraines (Before that once a month)  - As of 12/17/18: Chronic daily headaches - have been cut in half  6-9 migraine days a month - in past 5 weeks has had 8 (11/20, 26, 12/1,2,3,4,15,16)  - As of 3/27/19: migraines 3-4 in a month, daily headaches are gone  - as of 07/03/2019: At least 1 migraine a month, but lasting up to 1-2 weeks  - as of 10/14/2019: headaches 15/30 days, migraine 1 week    What time of the day do the headaches start?   no particular time for migraines, headaches may wake up with them   How long do the headaches last?    3-4 days, as of 7/3/19 up to 1-2 weeks   Are you ever headache free?   No     Aura?  without aura    Has a prodrome where she can tell it is going to be a migraine by the intensity of which it comes on      Describe your usual headache pain quality?   dull, stabbing, throbbing and pressure - pounding the most  Where is your headache located?  Usually on the right frontal/temporal, when starting to go away goes to left frontal - these are every day headaches    Migraines - usually just on the right frontal/temporal, sometimes right occipital    - throbbing right temple/retro-orbital and go down neck      Does the pain Radiate?  no radiation of pain  What is the intensity of pain?  Normal 5, migraines 7   Associated symptoms:   [x] Nausea       [] Vomiting        [] Diarrhea         [] Insomnia           [] Stiff or sore neck          [x] Problems with concentration  [] Photophobia      [x]Phonophobia      [x] Osmophobia  [] Blurred vision   [x] Prefer quiet, dark room        [x] Light-headed or dizzy    [] Tinnitus      [] Red ear      [] Ptosis      [] Facial droop  [] Lacrimation  [] Nasal congestion/rhinorrhea   [] Flushing of face         [] Change in pupil size  [] Hands or feet tingle or feel numb/paresthesias       Number of days missed per month because of headaches:  Work days: has to [employer]  Social or Family activities:  frequently       Things that make the headache worse?  Movement - with migraine, coughing, sneezing, bending over,      Headache triggers:   stress, change in eating pattern, menses, weather changes   What time of the year do headaches occur more frequently?   do not seem to be related to any time of the year      Have you seen someone else for headaches or pain? No - pain doctor in the past for herniated disc in back - had cortisone injection and did not work, surgery helped   Have you had trigger point injection performed and how often? No  Have you had Botox injection performed and how often? No   Have you had epidural injections or transforaminal injections performed? No  Have you used CBD or THC for your headaches and how often?  No  Are you current pregnant or planning on getting pregnant?  No, husbands is fixed   Have you ever had any Brain imaging? Yes, MRI Brain      What medications do you take or have you taken for your headaches? ABORTIVE:   - OTC pain meds did not help - does not use more than 3 days per week  - rizatriptan helps a little, but too tired      Decadron 2 mg for 3-5 days has helped migraine  Indomethacin 50 mg t i d  For 3 days decreased chronic daily headache     Past:  Toradol IM and PO  did not help  Sumatriptan - put her to sleep     PREVENTIVE:   -topiramate   Mg, b2   Vit D      Past meds:  - amitriptyline - does not remember clearly, went off when pregnant   - Gabapentin in past  - increased anxiety   - depakote for 5 days worsened migraine she thinks  - lexapro and buspar for mood - no side effects, stopped after a few years  - prazosin the past for PTSD/nightmares  - Vistaril 25 mg in past  - Gabapentin in past   - lyrica in past made her have SI   - seroquel     Alternative therapies used in the past for headaches? no other headache interventions have been tried     Neck pain?: No     LIFESTYLE  Sleep - averages 7-8 hours     Is your sleep restful?  Yes, 50/50  How often do you get up at night?  Wakes up about once half the nights   Do you wake up with headaches? Yes  Do you snore while asleep? No  Have you been told that you stop breathing during sleeping? No  Do you wake up tired in the morning? Yes  Do you take frequent naps during the day? Yes, used to every day, now cant because of work schedule      Physical activity: none   Water: 100 ounzes        Mood: stable  History of Bad anxiety and depression  - following with psychiatrist      Takes:   Ativan 0 5 mg once in the am   cymbalta 20 mg daily - started about a year ago, weaning down     In the past:  - lexapro, buspar for the longest - helped depression  prazosin the past for PTSD/nightmares  Vistaril 25 mg in past  Gabapentin in past   lyrica in past made her have SI   wellbutrin made her anxiety worse     The following portions of the patient's history were reviewed and updated as appropriate: allergies, current medications, past family history, past medical history, past social history, past surgical history and problem list      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  Education: Associates degree   Instructional school assistant  Lives with  marlon, son Leon Carrasquillo, daughter [de-identified]   13year old boy and 15year old daughter   Lost other son to cancer 10 years ago - neuroblastoma         Past Medical History:     Past Medical History:   Diagnosis Date    Chickenpox        Patient Active Problem List   Diagnosis    Anxiety and depression    Dietary calcium deficiency    Lumbar herniated disc    Vitamin D deficiency    Intractable migraine without aura and with status migrainosus    Chronic daily headache    Chronic migraine without aura without status migrainosus, not intractable    Nausea       Medications:      Current Outpatient Medications   Medication Sig Dispense Refill    Cholecalciferol (VITAMIN D3) 2000 units TABS Take by mouth      escitalopram (LEXAPRO) 10 mg tablet Take 10 mg by mouth daily  1    LORazepam (ATIVAN) 1 mg tablet Take by mouth      Magnesium Oxide -Mg Supplement 400 MG CAPS Take by mouth      Melatonin 3 MG CAPS Take 3 mg by mouth      ondansetron (ZOFRAN-ODT) 8 mg disintegrating tablet Take 1 tablet (8 mg total) by mouth every 8 (eight) hours as needed for nausea or vomiting 20 tablet 0    prochlorperazine (COMPAZINE) 10 mg tablet TAKE ONE TABLET BY MOUTH EVERY 6 HOURS AS NEEDED FOR MIGRAINE WITH OR WITHOUT NAUSEA  3    Riboflavin (B2 PO) Take by mouth      rizatriptan (MAXALT) 10 MG tablet       topiramate (TOPAMAX) 25 mg tablet Increase to 50 mg in am and 75 mg in pm for 1 week and then increase to 75 mg bid 180 tablet 3    ARIPiprazole (ABILIFY) 2 mg tablet Take 2 mg by mouth daily  1    dexamethasone (DECADRON) 2 mg tablet Take 1 tablet (2 mg total) by mouth daily with breakfast for 5 days 5 tablet 0    DULoxetine (CYMBALTA) 20 mg capsule Take 20 mg by mouth daily  3    DULoxetine (CYMBALTA) 30 mg delayed release capsule       fluticasone (FLONASE) 50 mcg/act nasal spray 1 spray into each nostril daily (Patient not taking: Reported on 10/14/2019) 1 Bottle 0    fluticasone-salmeterol (ADVAIR DISKUS) 500-50 mcg/dose inhaler inhale 2 puff by inhalation route 2 times every day in the morning and evening approximately 12 hours apart      Galcanezumab-gnlm 120 MG/ML SOAJ Inject 240 mg under the skin once for 1 dose 2 pen 0    Galcanezumab-gnlm 120 MG/ML SOAJ Inject 120 mg under the skin every 30 (thirty) days 1 pen 11    indomethacin (INDOCIN) 25 mg capsule Take one capsule by mouth up to 3 times a day as needed for headache with meals or carafate prior 12 capsule 0    lamoTRIgine (LaMICtal) 25 mg tablet Take 50 mg by mouth daily  1    sucralfate (CARAFATE) 1 g tablet TAKE 1 TABLET BY MOUTH 3 TIMES A DAY 30 MINUTES PRIOR TO INDOMETHACIN TO PROTECT STOMACH 20 tablet 6     No current facility-administered medications for this visit  Allergies:       Allergies   Allergen Reactions    Lyrica [Pregabalin]      Other reaction(s): Suicidal ideation       Family History:     Family History   Problem Relation Age of Onset    Hypertension Father     Breast cancer Maternal Grandmother     Stroke Maternal Grandmother     Cancer Maternal Grandfather         with unknown primary sit     Breast cancer Paternal Grandmother     Other Son         Neuroblastoma of abdomen    Breast cancer Maternal Aunt     Ovarian cancer Maternal Aunt         at age 28    Breast cancer Paternal Aunt         dx at age 52       Social History:     Social History     Socioeconomic History    Marital status: /Civil Union     Spouse name: Not on file    Number of children: Not on file    Years of education: associates degree    Highest education level: Not on file   Occupational History    Occupation: Patent processor   Social Needs    Financial resource strain: Not on file    Food insecurity:     Worry: Not on file     Inability: Not on file    Transportation needs:     Medical: Not on file     Non-medical: Not on file   Tobacco Use    Smoking status: Never Smoker    Smokeless tobacco: Never Used   Substance and Sexual Activity    Alcohol use: No    Drug use: No    Sexual activity: Not on file   Lifestyle    Physical activity:     Days per week: Not on file     Minutes per session: Not on file    Stress: Not on file   Relationships    Social connections:     Talks on phone: Not on file     Gets together: Not on file     Attends Yarsani service: Not on file     Active member of club or organization: Not on file     Attends meetings of clubs or organizations: Not on file     Relationship status: Not on file    Intimate partner violence:     Fear of current or ex partner: Not on file     Emotionally abused: Not on file     Physically abused: Not on file     Forced sexual activity: Not on file   Other Topics Concern    Not on file   Social History Narrative    Lack of exercise     Restorationist affiliation - non Judaism Bahai    Hobbies: reading, bike rides, volunteering    Employed:           Objective:     Physical Exam:                                                                 Vitals:            Constitutional:    /69 (BP Location: Right arm, Patient Position: Sitting, Cuff Size: Adult)   Pulse 60   Resp 16   Ht 5' 4" (1 626 m)   Wt 90 4 kg (199 lb 4 8 oz)   BMI 34 21 kg/m²   BP Readings from Last 3 Encounters:   10/14/19 105/69   07/03/19 100/70   04/24/19 119/75     Pulse Readings from Last 3 Encounters:   10/14/19 60   07/03/19 70   04/24/19 62         Well developed, well nourished, well groomed  No dysmorphic features  HEENT:  Normocephalic atraumatic  No meningismus  Oropharynx is clear and moist  No oral mucosal lesions  Chest:  Respirations regular and unlabored  Cardiovascular:  Regular rate, intact distal pulses  Distal extremities warm without palpable edema or tenderness, no observed significant swelling  Musculoskeletal:  Full range of motion  (see below under neurologic exam for evaluation of motor function and gait)   Skin:  warm and dry, not diaphoretic  No apparent birthmarks or stigmata of neurocutaneous disease  Psychiatric:  Normal behavior and appropriate affect        Neurological Examination:     Mental status/cognitive function:   Orientated to time, place and person  Recent and remote memory intact  Attention span and concentration as well as fund of knowledge are appropriate for age  Normal language and spontaneous speech  Cranial Nerves:  III, IV, VI-Pupils were equal, round, and reactive to light  Extraocular movements were full and conjugate without nystagmus  VII-facial expression symmetric, intact forehead wrinkle, strong eye closure, symmetric smile    VIII-hearing grossly intact bilaterally   Motor Exam: symmetric bulk throughout  no atrophy, fasciculations or abnormal movements noted     Coordination:  no apparent dysmetria, ataxia or tremor noted  Gait: steady casual gait       Pertinent lab results:   07/30/2019:  CMP and CBC unremarkable  Vitamin-D 45 5, B12 453, TSH 1 4    10/2018 Vit D 18 3 - replacement started     11/13/2018:  CMP within normal limits  B12 393  TSH 0 954     [facility]  Personally reviewed and unremarkable agree with radiology read of normal      MRI Brain with and without contrast 11/24/2018  Several small white matter hyperintensities are seen on T2 and FLAIR imaging within the frontal lobes without mass effect, diffusion abnormality or abnormal enhancement   These are nonspecific in a patient of this age and white matter lesions have been described within the frontal lobes in patients with chronic migraine headaches          Review of Systems:   ROS Obtained by MA and Personally reviewed and corrected if needed  Review of Systems   Constitutional: Negative  Negative for appetite change and fever  HENT: Negative  Negative for hearing loss, tinnitus, trouble swallowing and voice change  Eyes: Negative  Negative for photophobia and pain  Respiratory: Negative  Negative for shortness of breath  Cardiovascular: Negative  Negative for palpitations  Gastrointestinal: Positive for nausea  Negative for vomiting  Endocrine: Negative  Negative for cold intolerance and heat intolerance  Genitourinary: Negative  Negative for dysuria, frequency and urgency  Musculoskeletal: Negative  Negative for myalgias and neck pain  Skin: Negative  Negative for rash  Allergic/Immunologic: Negative  Neurological: Positive for headaches  Negative for dizziness, tremors, seizures, syncope, facial asymmetry, speech difficulty, weakness, light-headedness and numbness  Hematological: Negative  Does not bruise/bleed easily  Psychiatric/Behavioral: Negative  Negative for confusion, hallucinations and sleep disturbance  I have spent 41 minutes with Patient  today in which greater than 50% of this time was spent in counseling/coordination of care regarding Diagnostic results, Prognosis, Risks and benefits of tx options, Intructions for management, Importance of tx compliance, Risk factor reductions and Impressions        Author:  Angelique Garces MD 10/14/2019 4:01 PM

## 2019-10-14 ENCOUNTER — OFFICE VISIT (OUTPATIENT)
Dept: NEUROLOGY | Facility: CLINIC | Age: 39
End: 2019-10-14
Payer: COMMERCIAL

## 2019-10-14 VITALS
HEIGHT: 64 IN | WEIGHT: 199.3 LBS | HEART RATE: 60 BPM | SYSTOLIC BLOOD PRESSURE: 105 MMHG | BODY MASS INDEX: 34.02 KG/M2 | RESPIRATION RATE: 16 BRPM | DIASTOLIC BLOOD PRESSURE: 69 MMHG

## 2019-10-14 DIAGNOSIS — G43.709 CHRONIC MIGRAINE WITHOUT AURA WITHOUT STATUS MIGRAINOSUS, NOT INTRACTABLE: Primary | ICD-10-CM

## 2019-10-14 DIAGNOSIS — G43.011 INTRACTABLE MIGRAINE WITHOUT AURA AND WITH STATUS MIGRAINOSUS: ICD-10-CM

## 2019-10-14 PROCEDURE — 99215 OFFICE O/P EST HI 40 MIN: CPT | Performed by: PSYCHIATRY & NEUROLOGY

## 2019-10-14 RX ORDER — SUCRALFATE 1 G/1
TABLET ORAL
Qty: 20 TABLET | Refills: 6 | Status: SHIPPED | OUTPATIENT
Start: 2019-10-14 | End: 2019-10-16 | Stop reason: SDUPTHER

## 2019-10-14 RX ORDER — DULOXETIN HYDROCHLORIDE 20 MG/1
20 CAPSULE, DELAYED RELEASE ORAL DAILY
Refills: 3 | COMMUNITY
Start: 2019-07-14 | End: 2019-12-30

## 2019-10-14 RX ORDER — INDOMETHACIN 25 MG/1
CAPSULE ORAL
Qty: 12 CAPSULE | Refills: 0 | Status: SHIPPED | OUTPATIENT
Start: 2019-10-14 | End: 2019-12-30

## 2019-10-14 RX ORDER — ESCITALOPRAM OXALATE 10 MG/1
10 TABLET ORAL DAILY
Refills: 1 | COMMUNITY
Start: 2019-09-18 | End: 2019-12-30

## 2019-10-14 RX ORDER — PROCHLORPERAZINE MALEATE 10 MG
TABLET ORAL
Refills: 3 | COMMUNITY
Start: 2019-08-20 | End: 2019-12-30

## 2019-10-14 RX ORDER — ARIPIPRAZOLE 2 MG/1
2 TABLET ORAL DAILY
Refills: 1 | COMMUNITY
Start: 2019-09-18 | End: 2019-12-30

## 2019-10-14 RX ORDER — LAMOTRIGINE 25 MG/1
50 TABLET ORAL DAILY
Refills: 1 | COMMUNITY
Start: 2019-08-19 | End: 2019-12-30

## 2019-10-14 RX ORDER — DEXAMETHASONE 2 MG/1
2 TABLET ORAL
Qty: 5 TABLET | Refills: 0 | Status: SHIPPED | OUTPATIENT
Start: 2019-10-14 | End: 2019-10-19

## 2019-10-14 NOTE — PATIENT INSTRUCTIONS
Headache/migraine treatment:   Abortive medications (for immediate treatment of a headache): It is ok to take ibuprofen, acetaminophen or naproxen (Advil, Tylenol,  Aleve, Excedrin) if they help your headaches you should limit these to No more than 3 times a week to avoid medication overuse/rebound headaches      To try and stop this headache- dexamethasone 2 mg with breakfast for 3-5 days    (with indomethacin do not take any other NSAIDs such as ibuprofen, ketorolac/Toradol, naproxen/Aleve)  #  indomethacin 50 mg up to 3 times a day with meals or Carafate approximately 30 minutes prior to protect her stomach   - no more than 3 days per week   - if you need refills of this medication usually I do not fill earlier than a month just just contact me     For your more moderate to severe migraines take this medication early   -   rizatriptan (AXERT) 10 MG tablet; Take 1 tablet  by mouth once as needed for migraine for up to 1 dose may repeat in 2 hours if needed     Over the counter preventive supplements for headaches/migraines   (to take every day to help prevent headaches - not to take at the time of headache):  [x] Magnesium 400mg daily   [x] Riboflavin (Vitamin B2)  400mg daily   (FYI B2 may make your urine bright/neon yellow)     Prescription preventive medications for headaches/migraines   (to take every day to help prevent headaches - not to take at the time of headache):  [x] topiramate (TOPAMAX) to 50 mg in am and 75 mg in pm for 1 week, then 50 mg twice a day and continue  If you have significant improvement with the injectable medication, may decrease further     [x] trial of emgality - 2 pens the first month, 1 pen every month after     [x] when you discuss with your psychiatrist see if venlafaxine is an option (rather than lexapro)     Sleep/headache prevention:   [x] Melatonin - you may take 3 mg nightly for sleep  You should take this 1 hour prior to bedtime consistently every night for it to work   It works by gradually helping to adjust your sleep time over days to weeks, rather than immediately making you feel sleepy        Self-Monitoring:  [x] Headache calendar  Each day ray a number from 0-10 indicating if there was a headache and how bad it was   This can be used to monitor gradual improvement and is helpful to make medication adjustments  You can do this on paper or there is an WES for a smart phone called "Migraine e Diary"       Lifestyle Recommendations:  [x] SLEEP - Maintain a regular sleep schedule: Adults need at least 7-8 hours of uninterrupted a night  Maintain good sleep hygiene:  Going to bed and waking up at consistent times, avoiding excessive daytime naps, avoiding caffeinated beverages in the evening, avoid excessive stimulation in the evening and generally using bed primarily for sleeping   One hour before bedtime would recommend turning lights down lower, decreasing your activity (may read quietly, listen to music at a low volume)  When you get into bed, should eliminate all technology (no texting, emailing, playing with your phone, iPad or tablet in bed)  [x] HYDRATION - Maintain good hydration   Drink  2L of fluid a day (4 typical small water bottles)  [x] DIET - Maintain good nutrition  In particular don't skip meals and try and eat healthy balanced meals regularly  _Myfitnesspal is a great help for weight loss  [x] TRIGGERS - Look for other triggers and avoid them: Limit caffeine to 1-2 cups a day or less  Avoid dietary triggers that you have noticed bring on your headaches (this could include aged cheese, peanuts, MSG, aspartame and nitrates)      Education and Follow-up  [x] Please call with any questions or concerns     [x] Follow up 2-3 months with Dr Ramses Dunne and with Dr Felicity Macdonald as scheduled 2/14/19

## 2019-10-14 NOTE — PROGRESS NOTES
Review of Systems   Constitutional: Negative  Negative for appetite change and fever  HENT: Negative  Negative for hearing loss, tinnitus, trouble swallowing and voice change  Eyes: Negative  Negative for photophobia and pain  Respiratory: Negative  Negative for shortness of breath  Cardiovascular: Negative  Negative for palpitations  Gastrointestinal: Positive for nausea  Negative for vomiting  Endocrine: Negative  Negative for cold intolerance and heat intolerance  Genitourinary: Negative  Negative for dysuria, frequency and urgency  Musculoskeletal: Negative  Negative for myalgias and neck pain  Skin: Negative  Negative for rash  Allergic/Immunologic: Negative  Neurological: Positive for headaches  Negative for dizziness, tremors, seizures, syncope, facial asymmetry, speech difficulty, weakness, light-headedness and numbness  Hematological: Negative  Does not bruise/bleed easily  Psychiatric/Behavioral: Negative  Negative for confusion, hallucinations and sleep disturbance

## 2019-10-15 ENCOUNTER — TELEPHONE (OUTPATIENT)
Dept: NEUROLOGY | Facility: CLINIC | Age: 39
End: 2019-10-15

## 2019-10-15 DIAGNOSIS — G43.709 CHRONIC MIGRAINE WITHOUT AURA WITHOUT STATUS MIGRAINOSUS, NOT INTRACTABLE: ICD-10-CM

## 2019-10-15 NOTE — TELEPHONE ENCOUNTER
Received a fax from [facility] regarding medication sucralfate 1gm tablet  They stated that insurance will only cover 90 day supply  Please review

## 2019-10-16 DIAGNOSIS — G43.709 CHRONIC MIGRAINE WITHOUT AURA WITHOUT STATUS MIGRAINOSUS, NOT INTRACTABLE: ICD-10-CM

## 2019-10-16 RX ORDER — SUCRALFATE 1 G/1
TABLET ORAL
Qty: 60 TABLET | Refills: 1 | Status: SHIPPED | OUTPATIENT
Start: 2019-10-16 | End: 2019-10-16 | Stop reason: SDUPTHER

## 2019-10-16 RX ORDER — SUCRALFATE 1 G/1
TABLET ORAL
Qty: 60 TABLET | Refills: 1 | Status: SHIPPED | OUTPATIENT
Start: 2019-10-16 | End: 2019-12-30

## 2019-10-16 NOTE — TELEPHONE ENCOUNTER
[facility] pharmacy faxed alternative request for patient sucralfate (CARAFATE) 1 g tablet as per [facility] pharmacy pt insurance will only cover a 90 day supply

## 2019-10-16 NOTE — TELEPHONE ENCOUNTER
Got this same message yesterday so I redid the order for 90 day supply this morning, not sure what they want differently, could you ask them?

## 2019-10-16 NOTE — TELEPHONE ENCOUNTER
The quantity on this patient medication is off  The original Rx says a qty of 60 the sig advise 1 tab PO 3x a day therefore it will be a qty of 270  A new Rx will have to be sent

## 2019-10-23 ENCOUNTER — TELEPHONE (OUTPATIENT)
Dept: NEUROLOGY | Facility: CLINIC | Age: 39
End: 2019-10-23

## 2019-10-23 DIAGNOSIS — G43.709 CHRONIC MIGRAINE WITHOUT AURA WITHOUT STATUS MIGRAINOSUS, NOT INTRACTABLE: ICD-10-CM

## 2019-10-23 NOTE — TELEPHONE ENCOUNTER
pt called and states that emgality was prescribed recently and was told it needed a PA   she was checking status of this   i do not see that we received notification that PA was needed  Attempted to call pharm, pharm not open yet  Will try after 9am  [phone] to leave a detailed messge

## 2019-10-23 NOTE — TELEPHONE ENCOUNTER
Called pharm and this needs to be filled at [facility]  Does not give her any info if PA is needed  Can call  [phone]  Called this number and this is [facility]  Both loading and maintenance doses called in  He is not able to tell if this will need PA yet  I gave him pt's insurance info  If PA is needed they will notify our office  Left detailed message for pt making her aware that med needed to be sent to specialty pharm and they will contact us if PA is needed otherwise med will be shipped to her  If PA is needed I told her that we would call her with the determination

## 2019-10-24 DIAGNOSIS — G43.709 CHRONIC MIGRAINE WITHOUT AURA WITHOUT STATUS MIGRAINOSUS, NOT INTRACTABLE: ICD-10-CM

## 2019-10-24 RX ORDER — TOPIRAMATE 25 MG/1
TABLET ORAL
Qty: 180 TABLET | Refills: 0 | Status: CANCELLED | OUTPATIENT
Start: 2019-10-24

## 2019-10-24 RX ORDER — TOPIRAMATE 25 MG/1
TABLET ORAL
Qty: 120 TABLET | Refills: 3 | Status: SHIPPED | OUTPATIENT
Start: 2019-10-24 | End: 2019-11-22 | Stop reason: SDUPTHER

## 2019-10-30 NOTE — TELEPHONE ENCOUNTER
Patient made aware  She is concerned about using the copay card as this expires after 1 year and she may end up paying out of pocket  She reports she has tried and failed both topamax and depkote  She is unable to tolerate propranolol or timolol as she has low blood pressure  This is documented in Dr Zepeda Ice note: "blood pressure is consistently low and would not tolerate BP med "    Discussed with patient and she would like appeal to be filed  Will file appeal tomorrow  Rx will also need to be sent to [facility]

## 2019-10-30 NOTE — TELEPHONE ENCOUNTER
Received fax from [facility] that emgality has been approved  Reviewed the letter and it says [facility] was unable to approve your request      Called [facility] to clarify approval  Spoke w/ Benitez Mcgowan and states that PA has been denied  Pt must try 3 or more formulary alt: topiramate, depakote, propranolol, timolol  And emgality must be filled [facility]  Called and left a message on pt's answering machine for a call back  Clinical team: when pt calls back, pls advise of the above  Ask if she wants to try propranolol or timolol or use emgality savings card

## 2019-10-30 NOTE — TELEPHONE ENCOUNTER
Received fax from [facility] that PA needed  Info submitted, awaiting question set       Kathleen Dominguez

## 2019-10-31 NOTE — TELEPHONE ENCOUNTER
Generated letter with below information for appeal  Faxed this with a copy of initial PA, denial letter, and last office note to number below  Awaiting determination, will follow up

## 2019-10-31 NOTE — TELEPHONE ENCOUNTER
[facility] called and states that they received notification that med eas denied  I made her aware that we were aware of this and that we had sent an appeal letter this am   She is requesting that once we receive determination that we call them

## 2019-10-31 NOTE — TELEPHONE ENCOUNTER
Called [facility] at [phone] and she spoke with Gianni Prasad  She states the she cannot open up a new PA, additional information will need to be faxed to [facility] Department at [phone]  Script has already been called into [facility] on 10/23 by Taqueria Smith

## 2019-11-05 NOTE — TELEPHONE ENCOUNTER
Called [employer] at [phone] and spoke with oJya Layne, she states appeals are handled by another department  Transferred to department, phone number is [phone] and spoke with Jil Rodriguez  She states they did not receive appeal  She states that a form must be completed and sent with patient's appeal information  This form must be signed by the patient  Received form  All information should be faxed to [phone]

## 2019-11-05 NOTE — TELEPHONE ENCOUNTER
Form completed, awaiting patient's signature  Called and Left a message on pt's answering machine for a call back  When patient calls back please see where/when she would like to come in to sign form  Form allows clinical team to appeal Emgality denial on her behalf  Form scanned into chart

## 2019-11-06 ENCOUNTER — TELEPHONE (OUTPATIENT)
Dept: NEUROLOGY | Facility: CLINIC | Age: 39
End: 2019-11-06

## 2019-11-06 DIAGNOSIS — G43.011 INTRACTABLE MIGRAINE WITHOUT AURA AND WITH STATUS MIGRAINOSUS: Primary | ICD-10-CM

## 2019-11-06 RX ORDER — DEXAMETHASONE 2 MG/1
2 TABLET ORAL
Qty: 5 TABLET | Refills: 0 | Status: SHIPPED | OUTPATIENT
Start: 2019-11-06 | End: 2019-11-11

## 2019-11-06 NOTE — TELEPHONE ENCOUNTER
Patient called to report migraine she has had since Friday that she cant seem to break  Migraine pain is located to right side of head/temple area and goes into cheek  Patient describes as throbbing type sensation  Associated symptoms include nausea  Patient actually woke up every 30 minutes due to migraine pain  Patient has tried abortive of indomethacin but did not relieve pain  Patient is requesting steroid  [facility]  Ok to leave detailed message when sent

## 2019-11-06 NOTE — TELEPHONE ENCOUNTER
Patient came in stating she spoke to one of the nurses and needed to come into the office to sign a form  There was no documentation but I did see an appeal form in the media tab, I had patient sign it then scanned into media tab

## 2019-11-06 NOTE — TELEPHONE ENCOUNTER
Patient returned call  Agreeable to coming to office to sign for appeal  Will stop by [facility] office today

## 2019-11-07 NOTE — TELEPHONE ENCOUNTER
Faxed signed ADAR form with letter, copy of PA, copy of denial letter, and last office note successfully to [phone]  Will follow up

## 2019-11-11 NOTE — TELEPHONE ENCOUNTER
Called [employer] at number below and spoke with [place]  She states faxed forms are not uploaded into their system, she is unsure if they received them yet  She states it can take up to 3 days for documentation to be uploaded to their system  Confirmed their fax number is [phone]  Faxed below documents again and will follow up in several days to ensure receipt

## 2019-11-13 NOTE — TELEPHONE ENCOUNTER
Call from Vitaliy at [employer]  States that the appeal needs to be signed with a name in the represenitive line  Please sign and fax back to us  Form signed and faxed by Mayte River RN

## 2019-11-20 NOTE — TELEPHONE ENCOUNTER
Called [employer] at [phone] and spoke with Blanche  She reports updated ADAR has not been received and appeal cannot be processed without this  Made her aware updated ADAR was faxed on 11/13, explained that appeal and ADAR have already been faxed multiple times and this is delaying patient care  She advised that I fax updated ADAR to [phone], attn: Marylu Halsted  Faxed successfully to this number and to [phone]  Awaiting determination

## 2019-11-20 NOTE — TELEPHONE ENCOUNTER
Blanche calling back, she confirms that she received updated ADAR and has forwarded all documents to the appeals department  Awaiting determination

## 2019-11-21 ENCOUNTER — TELEPHONE (OUTPATIENT)
Dept: NEUROLOGY | Facility: CLINIC | Age: 39
End: 2019-11-21

## 2019-11-21 NOTE — TELEPHONE ENCOUNTER
Even though patient does not feel topiramate is helping I would not wean off of it prematurely  If patient is not having any side effects, if anything we can always go up on it until her other medication is approved    Thank you

## 2019-11-21 NOTE — TELEPHONE ENCOUNTER
Patient made aware and is agreeable to increasing while awaiting Emgality start  In addition to instructions, she will also need a new rx

## 2019-11-21 NOTE — TELEPHONE ENCOUNTER
pt called for update on appeal   made her aware of below  Made her aware that once we receive a determination we will call her  [phone] to leave a detailed message

## 2019-11-21 NOTE — TELEPHONE ENCOUNTER
pt called and states that she is currenlty taking topiramate 50mg bid,  she does not feel that topiramate is helping, she is asking if she can wean off even though she hasn't started emgality yet  we are currently appealing emgality       per office note-decrease topiramate gradually to 50 mg b i d  may decrease further depending on response to CGRP   please advise   [phone] to leave a detailed message

## 2019-11-22 DIAGNOSIS — G43.709 CHRONIC MIGRAINE WITHOUT AURA WITHOUT STATUS MIGRAINOSUS, NOT INTRACTABLE: ICD-10-CM

## 2019-11-22 RX ORDER — TOPIRAMATE 50 MG/1
TABLET, FILM COATED ORAL
Qty: 120 TABLET | Refills: 2 | Status: SHIPPED | OUTPATIENT
Start: 2019-11-22 | End: 2022-03-18

## 2019-11-22 NOTE — TELEPHONE ENCOUNTER
Detailed message left for patient informing her of Dr Auguste prior message  Requested a call back if questions

## 2019-11-22 NOTE — TELEPHONE ENCOUNTER
New script sent out  Please have her increase to 50 mg in am and 100mg in pm for 7 days then two in am and two in pm after that

## 2019-11-23 ENCOUNTER — TELEPHONE (OUTPATIENT)
Dept: NEUROLOGY | Facility: CLINIC | Age: 39
End: 2019-11-23

## 2019-11-23 DIAGNOSIS — G43.119 INTRACTABLE MIGRAINE WITH AURA WITHOUT STATUS MIGRAINOSUS: Primary | ICD-10-CM

## 2019-11-23 RX ORDER — METHYLPREDNISOLONE 4 MG/1
TABLET ORAL
Qty: 21 TABLET | Refills: 0 | Status: SHIPPED | OUTPATIENT
Start: 2019-11-23 | End: 2019-12-30

## 2019-11-23 NOTE — TELEPHONE ENCOUNTER
Received a call from the pt, she has ahd a headache for more than a week, worsened last night to a migraine  Hisotorically, she ahs nto responded to any of the abortive treatments, has tried steroids before, which help  Will call in a medrol dose pack  She will follow up with Dr Jeremy Wisdom next week to discuss abortive treatments

## 2019-11-25 NOTE — TELEPHONE ENCOUNTER
Called [employer] at [phone] and spoke with [facility]  She reports that denial was overturned today and pharmacy should have received the determination  She reports they are generating letter that will be faxed to our office  She cannot offer anymore information at this time, states we may need to wait until tomorrow for all of the details  She recommended checking with the pharmacy  Called [facility] and spoke with Nadeem Byrne, she states they did not receive a determination letter  She states they will restart order and they will verify benefits at that time  Awaiting letter

## 2019-11-26 NOTE — TELEPHONE ENCOUNTER
maximus from [facility] department   emgality approved from 11/22/19-5/22/20  She will fax approval letter  States that override is in the system and pt can get her medication  [facility]ed [facility] and made them aware of approval   She states that she will send this info to insurance dept and They will run insurance benefits again, which can take 24 hours  Left detailed message for pt regarding above

## 2019-12-20 ENCOUNTER — TELEPHONE (OUTPATIENT)
Dept: NEUROLOGY | Facility: CLINIC | Age: 39
End: 2019-12-20

## 2019-12-30 ENCOUNTER — OFFICE VISIT (OUTPATIENT)
Dept: NEUROLOGY | Facility: CLINIC | Age: 39
End: 2019-12-30
Payer: COMMERCIAL

## 2019-12-30 ENCOUNTER — TELEPHONE (OUTPATIENT)
Dept: NEUROLOGY | Facility: CLINIC | Age: 39
End: 2019-12-30

## 2019-12-30 VITALS
WEIGHT: 190.3 LBS | DIASTOLIC BLOOD PRESSURE: 74 MMHG | HEIGHT: 64 IN | SYSTOLIC BLOOD PRESSURE: 112 MMHG | RESPIRATION RATE: 16 BRPM | BODY MASS INDEX: 32.49 KG/M2 | HEART RATE: 70 BPM

## 2019-12-30 DIAGNOSIS — G43.119 INTRACTABLE MIGRAINE WITH AURA WITHOUT STATUS MIGRAINOSUS: ICD-10-CM

## 2019-12-30 DIAGNOSIS — F43.12 CHRONIC POST-TRAUMATIC STRESS DISORDER (PTSD): ICD-10-CM

## 2019-12-30 DIAGNOSIS — F32.A ANXIETY AND DEPRESSION: ICD-10-CM

## 2019-12-30 DIAGNOSIS — F41.9 ANXIETY AND DEPRESSION: ICD-10-CM

## 2019-12-30 DIAGNOSIS — G43.709 CHRONIC MIGRAINE WITHOUT AURA WITHOUT STATUS MIGRAINOSUS, NOT INTRACTABLE: Primary | ICD-10-CM

## 2019-12-30 PROCEDURE — 99214 OFFICE O/P EST MOD 30 MIN: CPT | Performed by: PSYCHIATRY & NEUROLOGY

## 2019-12-30 RX ORDER — CLONAZEPAM 0.5 MG/1
1 TABLET ORAL DAILY
Refills: 3 | COMMUNITY
Start: 2019-12-14

## 2019-12-30 RX ORDER — METHYLPREDNISOLONE 4 MG/1
TABLET ORAL
Qty: 21 TABLET | Refills: 0 | Status: SHIPPED | OUTPATIENT
Start: 2019-12-30 | End: 2022-03-18

## 2019-12-30 RX ORDER — PROCHLORPERAZINE MALEATE 10 MG
10 TABLET ORAL EVERY 6 HOURS PRN
Qty: 12 TABLET | Refills: 3 | Status: SHIPPED | OUTPATIENT
Start: 2019-12-30 | End: 2022-04-04

## 2019-12-30 NOTE — PATIENT INSTRUCTIONS
Agree with considering medical marijuana    See if your therapist does cognitive behavioral therapy    Curable - Download this free Michael on your phone (they will offer subscription, but you do not have to do this)  - I recommend listening to the first approximately 5 or so lectures (more if you want) that are about 10-20 minutes long on the neuroscience of pain  - They discuss how pain works in the brain and steps to try and cure chronic pain     Headache/migraine treatment:   Abortive medications (for immediate treatment of a headache): It is ok to take ibuprofen, acetaminophen or naproxen (Advil, Tylenol,  Aleve, Excedrin) if they help your headaches you should limit these to No more than 3 times a week to avoid medication overuse/rebound headaches       For your more moderate to severe migraines take this medication early   - prochlorperazine/Compazine 10 mg as needed-technically this is a nausea medication but can also work for migraines  -   rizatriptan (AXERT) 10 MG tablet; Take 1 tablet  by mouth once as needed for migraine for up to 1 dose may repeat in 2 hours if needed    Steroids if needed      Over the counter preventive supplements for headaches/migraines   (to take every day to help prevent headaches - not to take at the time of headache):  [x] Magnesium 400mg daily   [x] Riboflavin (Vitamin B2)  400mg daily   (FYI B2 may make your urine bright/neon yellow)     Prescription preventive medications for headaches/migraines   (to take every day to help prevent headaches - not to take at the time of headache):  -  topiramate (TOPAMAX) to 75 mg in am and in pm for 1 week, then 50 mg in am and 75 mg in pm for 1 week, then 50 mg twice a day and continue  - can continue to wean if you wish depending on your reaction by taking 25 mg off weekly      - continue trial of emgality        Sleep/headache prevention:   [x] Melatonin - you may take 3 mg nightly for sleep   You should take this 1 hour prior to bedtime consistently every night for it to work  It works by gradually helping to adjust your sleep time over days to weeks, rather than immediately making you feel sleepy        Self-Monitoring:  [x] Headache calendar  Each day ray a number from 0-10 indicating if there was a headache and how bad it was   This can be used to monitor gradual improvement and is helpful to make medication adjustments  You can do this on paper or there is an WES for a smart phone called "Migraine e Diary"       Lifestyle Recommendations:  [x] SLEEP - Maintain a regular sleep schedule: Adults need at least 7-8 hours of uninterrupted a night  Maintain good sleep hygiene:  Going to bed and waking up at consistent times, avoiding excessive daytime naps, avoiding caffeinated beverages in the evening, avoid excessive stimulation in the evening and generally using bed primarily for sleeping   One hour before bedtime would recommend turning lights down lower, decreasing your activity (may read quietly, listen to music at a low volume)  When you get into bed, should eliminate all technology (no texting, emailing, playing with your phone, iPad or tablet in bed)  [x] HYDRATION - Maintain good hydration   Drink  2L of fluid a day (4 typical small water bottles)  [x] DIET - Maintain good nutrition  In particular don't skip meals and try and eat healthy balanced meals regularly  _Myfitnesspal is a great help for weight loss  [x] TRIGGERS - Look for other triggers and avoid them: Limit caffeine to 1-2 cups a day or less  Avoid dietary triggers that you have noticed bring on your headaches (this could include aged cheese, peanuts, MSG, aspartame and nitrates)      Education and Follow-up  [x] Please call with any questions or concerns     [x] Follow up 2 months with Dr Marcos Rodriguez

## 2019-12-30 NOTE — TELEPHONE ENCOUNTER
Patient filled out release of health information while at the office today  Patient requesting records be sent from [facility] to [facility] office  Scanned copy of request into media, and faxed to [facility]  Advised patient of 30 day turn around

## 2019-12-30 NOTE — PROGRESS NOTES
[address] [facility] Consult - Follow up   PATIENT:  Brendon Alvarenga  MRN:  [id number]  :  1980  DATE OF SERVICE:  2019  REFERRED BY: Nilsa Lambert MD  PMD: Prabhakar Kingston PA-C    Assessment/Plan:     Kilpatrick Necessary a 39 Y  o  female with a past medical history of PTSD, anxiety, depression, vitamin-D deficiency, herniated lumbar disc referred here for evaluation of headache/migraine    Initial visit 2018  Follow-up 2018, 2019, 2019, 10/14/2019     Chronic migraine without aura without status migrainosus  PTSD  Anxiety and depression   She reports throughout her life she really only had headaches about once a month until 3 years ago at age 27 she started developing daily headaches and migraines  Evaline Bryan typically a few days prior to menses  Typically her headaches and migraines are right frontal/temporal/retro-orbital and sometimes go down the neck with associated symptoms of nausea, phonophobia, Osmophobia, lightheadedness and problems with concentration   - as of 18: 6-9 migraine days a month, chronic daily headache  - as of 2018:  Topiramate has cut  chronic daily headaches in half  - As of 3/27/19: migraines 3-4 in a month, daily headaches are gone  - as of 2019:  At least 1 migraine a month, but lasting up to 1-2 weeks  - as of 10/14/2019: headaches 15/30 days, 1 week migraine a month  - as of 19:  Chronic headaches and migraines continue and I am not improved since increasing topiramate to 100 mg b i d  And therefore will wean down again  Had 1 emgality shot without improvement will continue for 3 month trial   Her psychiatrist stopped all her psychiatry meds except for Klonopin (Abilify, lamotrigine, Lexapro, Ativan)  Canceled appointment with our headache specialist Dr Brinda Evans despite my recommendation  She plans to look into medical marijuana    Steroids seemed to be the only thing that helps with migraines and felt Medrol Dosepak help more than dexamethasone  Trial of prochlorperazine which has not tried yet      Workup:  -Nyoka Boor and without contrast 11/24/2018: Several small white matter hyperintensities are seen on T2 and FLAIR imaging within the frontal lobes without mass effect, diffusion abnormality or abnormal enhancement   These are nonspecific in a patient of this age and white matter lesions have been described within the frontal lobes in patients with chronic migraine headaches       Preventative:  - continue magnesium 400 mg a day, vitamin B2 400 daily and melatonin   - We discussed headache hygiene and lifestyle factors which may include improve her headaches including hydration, exercise, diet and weight loss     - decrease topiramate gradually to 50 mg b i d  may decrease further depending on response to CGRP Discussed possible side effects and risks  - continue trial of emgality  Discussed proper use, possible side effects and risks  - past:  topiramate has not helped sufficiently, Depakote  made it worse, amitriptyline, gabapentin increased anxiety, Lexapro use prior for mood without effect on migraine, prazosin for PTSD, Vistaril, Lyrica caused SI, cymbalta helped mood but caused headaches, SI from lamotrigine, seroquel SE, Buspar, blood pressure is consistently low and would not tolerate BP med     Abortive:  - discussed not taking more than 3 days a week any abortive medication to avoid medication overuse/rebound  - continue rizatriptan 10 mg as needed for abortive  (sometimes works)  Discussed proper use, possible side effects and risks  - again discussed trial of prochlorperazine/Compazine 10 mg Discussed proper use, possible side effects and risks  - Medrol Dosepak for next unrelenting migraine  Discussed proper use, possible side effects and risks    - past:  Indomethacin caused anxiety attack, rizatriptan made her too sleepy, toradol IM and PO did not help, Sumatriptan has worked a few times however most the time this makes her too sleepy to continue with her day   Depakote for 5 nights reportedly made her migraine worse  -  future options:  indomethacin 50 mg t i d  For 3 days - she thinks this knocked out/decreased in half daily headaches, dexamethasone 2 mg for 3-5 days that helped get rid of a migraine, she feels Medrol Dosepak to help the most could use again in future   - Other options to consider in the future for abortive include  Olanzapine      Vitamin-D deficiency  - Her primary care doctor is already repleting her vitamin-D         Patient instructions   Agree with considering medical marijuana    See if your therapist does cognitive behavioral therapy    [employer] - Download this free Michael on your phone (they will offer subscription, but you do not have to do this)  - I recommend listening to the first approximately 5 or so lectures (more if you want) that are about 10-20 minutes long on the neuroscience of pain  - They discuss how pain works in the brain and steps to try and cure chronic pain     Headache/migraine treatment:   Abortive medications (for immediate treatment of a headache): It is ok to take ibuprofen, acetaminophen or naproxen (Advil, Tylenol,  Aleve, Excedrin) if they help your headaches you should limit these to No more than 3 times a week to avoid medication overuse/rebound headaches       For your more moderate to severe migraines take this medication early   - prochlorperazine/Compazine 10 mg as needed-technically this is a nausea medication but can also work for migraines  -   rizatriptan (AXERT) 10 MG tablet;  Take 1 tablet  by mouth once as needed for migraine for up to 1 dose may repeat in 2 hours if needed    Steroids if needed      Over the counter preventive supplements for headaches/migraines   (to take every day to help prevent headaches - not to take at the time of headache):  [x] Magnesium 400mg daily   [x] Riboflavin (Vitamin B2)  400mg daily   (FYI B2 may make your urine bright/neon yellow)     Prescription preventive medications for headaches/migraines   (to take every day to help prevent headaches - not to take at the time of headache):  -  topiramate (TOPAMAX) to 75 mg in am and in pm for 1 week, then 50 mg in am and 75 mg in pm for 1 week, then 50 mg twice a day and continue  - can continue to wean if you wish depending on your reaction by taking 25 mg off weekly      - continue trial of emgality        Sleep/headache prevention:   [x] Melatonin - you may take 3 mg nightly for sleep  You should take this 1 hour prior to bedtime consistently every night for it to work  It works by gradually helping to adjust your sleep time over days to weeks, rather than immediately making you feel sleepy        Self-Monitoring:  [x] Headache calendar  Each day ray a number from 0-10 indicating if there was a headache and how bad it was   This can be used to monitor gradual improvement and is helpful to make medication adjustments  You can do this on paper or there is an WES for a smart phone called "Migraine e Diary"       Lifestyle Recommendations:  [x] SLEEP - Maintain a regular sleep schedule: Adults need at least 7-8 hours of uninterrupted a night  Maintain good sleep hygiene:  Going to bed and waking up at consistent times, avoiding excessive daytime naps, avoiding caffeinated beverages in the evening, avoid excessive stimulation in the evening and generally using bed primarily for sleeping   One hour before bedtime would recommend turning lights down lower, decreasing your activity (may read quietly, listen to music at a low volume)  When you get into bed, should eliminate all technology (no texting, emailing, playing with your phone, iPad or tablet in bed)  [x] HYDRATION - Maintain good hydration   Drink  2L of fluid a day (4 typical small water bottles)  [x] DIET - Maintain good nutrition   In particular don't skip meals and try and eat healthy balanced meals regularly  _Myfitnesspal is a great help for weight loss  [x] TRIGGERS - Look for other triggers and avoid them: Limit caffeine to 1-2 cups a day or less  Avoid dietary triggers that you have noticed bring on your headaches (this could include aged cheese, peanuts, MSG, aspartame and nitrates)      Education and Follow-up  [x] Please call with any questions or concerns  [x] Follow up 2 months with Dr Annalee Rondon            CC: We had the pleasure of evaluating Moni Anthony neurological consultation today  she is a 38 y  o    right handed female who presents today for evaluation of headaches       History of Present Illness:   Interval history as of 12/30/19  - she cancelled follow up with Dr Anita Moody, because she wants to do medical MJ  - had 1 emgality shot without improvement  - mood has been up and down, overall worse anxiety, following with psychiatry who stopped all her meds  - [place] is a hard time for her, her son that she lost his Amy Escalera is [place]  - started seeing a psychology again   - psych - trying to get off klonopin, ativan which she has been on for years   - stopped  Abilify, lamotrigine, duloxetine, Lexapro  - now only on klonopin, topiramate 100 mg BID and did not help   - dexamethasone 2 mg daily for 5 days worked a little, but medrol dose pack 11/23/19 helped more   - indomethacin is causing panic attacks, lower dose did not work     - rizatriptan does not seem to be working most of the time and knocks her out and migraine comes back the next day   - taking melatonin, magnesium    Interval history as of 10/14/2019  - 7/30/2019:  CMP and CBC unremarkable  Vitamin-D 45 5, B12 453, TSH 1 4     Mood/Psych  - over the summer was on lamictal for 2 months since last visit through psychiatry  Was ok on 25 mg and then increased to 50 mg - had suicidal thoughts - no longer had those  - seroquel started - unknown dose - woke up in the middle of night and felt very dizzy (seroquel, topiramate, lexapro)  - has discussed with psychiatrist      Headaches/migraines  - last visit increased topiramate 50 mg b i d  Gradually to 75 mg b i d  - feels good on it  - now only getting migraines right before or during period   - indomethacin was working and  last too times had a panic attck  - rizatriptan does not seem to be working most of the time and knocks her out and migraine comes back the next day      - taking lexapro   - taking the melatonin, rb, mg        Interval history as of 07/03/2019:  - 05/10/2019: Benitez Barrera called in reporting migraines since 05/07   Throbbing dull right temporal, phonophobia   Nonresponsive to naproxen, Triptan x2, ketorolac/Toradol - sent Depakote for 5 to evenings to pharmacy  -05/12/2019 patient called in reporting on day 3 of Depakote without improvement  - 05/13/2019 migraine not relieved by Depakote, possibly worse - therefore sent dexamethasone 2 mg p o  Daily with breakfast for 5 days helped in the past and in this case  (had been on antibiotics then)     -06/24/2019: Benitezcarlos Barrera called with migraine starting 6/17 - throbbing right temple/retro-orbital and go down neck  nausea-nonresponsive to rizatriptan, ketorolac/Toradol, requesting med for nausea   Prescribe prochlorperazine - which she did not take as she was concerned about it lowering her blood pressure, then prescribed ondansetron  - called 07/01/2019 reporting dexamethasone resolved migraine but came back soon after worsening chronic anxiety and depression     - in the past 3 months migraines worsened,   One major one in may lasting a week, better with steroids  6/17 to now started with period, mild improvement with rizatriptan a little, reports took 1 compazine and did not help much  - indomethacin has worked in the past     - continues topiramate 50 mg b i d   Cymbalta/duloxetine 40 mg daily, Ativan 1 mg daily, taking the melatonin, rb, mg  She is wondering if this stopped working     Interval history as of 03/27/2019  - 01/21/2019 refilled rizatriptan 10 mg, topiramate, Toradol 10 mg  - called in 03/07/2019 reporting migraine not responding to rizatriptan or Toradol - 2 mg dexamethasone worked  - has cut out diet soda which she used to take daily  - Daily headaches are gone  - rizatriptan does not seem to be working most of the time and knocks her out and migraine comes back the next day   - toradol does not help too much, but has been hesitant to take it   - migraines 3-4 times a month  - denies SE      Interval events as of 12/17/2018:   - has decreased daily OTC pain meds use, to once or twice in past 5 weeks   - topiramate has cut daily headaches in half  12/3/18: migraine treated with dexamethasone 1 mg daily for 3 days  - bad news yesterday, they get insurance through the state, topiramate will not be covered         Headaches started at what age? Started regularly at 29 years old  How often do the headaches occur?   - as of 11/2018 - Past 3 years daily headache and migraines (Before that once a month)  - As of 12/17/18: Chronic daily headaches - have been cut in half  6-9 migraine days a month - in past 5 weeks has had 8 (11/20, 26, 12/1,2,3,4,15,16)  - As of 3/27/19: migraines 3-4 in a month, daily headaches are gone  - as of 07/03/2019:  At least 1 migraine a month, but lasting up to 1-2 weeks  - as of 10/14/2019: headaches 15/30 days, migraine 1 week    What time of the day do the headaches start?   no particular time for migraines, headaches may wake up with them   How long do the headaches last?    3-4 days, as of 7/3/19 up to 1-2 weeks   Are you ever headache free?   No     Aura?  without aura    Has a prodrome where she can tell it is going to be a migraine by the intensity of which it comes on      Describe your usual headache pain quality?   dull, stabbing, throbbing and pressure - pounding the most  Where is your headache located?  Usually on the right frontal/temporal, when starting to go away goes to left frontal - these are every day headaches    Migraines - usually just on the right frontal/temporal, sometimes right occipital    - throbbing right temple/retro-orbital and go down neck      Does the pain Radiate?  no radiation of pain  What is the intensity of pain?  Normal 5, migraines 7   Associated symptoms:   [x] Nausea       [] Vomiting        [] Diarrhea         [] Insomnia           [] Stiff or sore neck          [x] Problems with concentration  [] Photophobia      [x]Phonophobia      [x] Osmophobia  [] Blurred vision   [x] Prefer quiet, dark room        [x] Light-headed or dizzy    [] Tinnitus      [] Red ear      [] Ptosis      [] Facial droop  [] Lacrimation  [] Nasal congestion/rhinorrhea   [] Flushing of face         [] Change in pupil size  [] Hands or feet tingle or feel numb/paresthesias       Number of days missed per month because of headaches:  Work days: has to [employer]  Social or Family activities:  frequently       Things that make the headache worse?  Movement - with migraine, coughing, sneezing, bending over,      Headache triggers:   stress, change in eating pattern, menses, weather changes   What time of the year do headaches occur more frequently?   do not seem to be related to any time of the year      Have you seen someone else for headaches or pain? No - pain doctor in the past for herniated disc in back - had cortisone injection and did not work, surgery helped   Have you had trigger point injection performed and how often? No  Have you had Botox injection performed and how often? No   Have you had epidural injections or transforaminal injections performed? No  Have you used CBD or THC for your headaches and how often?  No  Are you current pregnant or planning on getting pregnant?  No, husbands is fixed   Have you ever had any Brain imaging? Yes, MRI Brain      What medications do you take or have you taken for your headaches?    ABORTIVE:   - OTC pain meds did not help - does not use more than 3 days per week  - rizatriptan helps a little, but too tired      Decadron 2 mg for 3-5 days has helped migraine  Indomethacin 50 mg t i d  For 3 days decreased chronic daily headache     Past:  Toradol IM and PO  did not help  Sumatriptan - put her to sleep     PREVENTIVE:   -topiramate   Mg, b2   Vit D      Past meds:  - amitriptyline - does not remember clearly, went off when pregnant   - Gabapentin in past  - increased anxiety   - depakote for 5 days worsened migraine she thinks  - lexapro and buspar for mood - no side effects, stopped after a few years  - prazosin the past for PTSD/nightmares  - Vistaril 25 mg in past  - Gabapentin in past   - lyrica in past made her have SI   - seroquel     Alternative therapies used in the past for headaches? no other headache interventions have been tried     Neck pain?: No     LIFESTYLE  Sleep - averages 7-8 hours     Is your sleep restful?  Yes, 50/50  How often do you get up at night?  Wakes up about once half the nights   Do you wake up with headaches? Yes  Do you snore while asleep? No  Have you been told that you stop breathing during sleeping? No  Do you wake up tired in the morning? Yes  Do you take frequent naps during the day? Yes, used to every day, now cant because of work schedule      Physical activity: none   Water: 100 ounzes        Mood: stable  History of Bad anxiety and depression  - following with psychiatrist      Takes:   Ativan 0 5 mg once in the am   cymbalta 20 mg daily - started about a year ago, weaning down     In the past:  - lexapro buspar for the longest - helped depression  prazosin the past for PTSD/nightmares  Vistaril 25 mg in past  Gabapentin in past   lyrica in past made her have SI   wellbutrin made her anxiety worse     The following portions of the patient's history were reviewed and updated as appropriate: allergies, current medications, past family history, past medical history, past social history, past surgical history and problem list      Work: Education:  Instructional school assistant  Education: Associates degree  Instructional school assistant  Lives with  marlon, son Malu Toure, daughter [de-identified]   13year old boy and 15year old daughter   Lost other son to cancer 10 years ago - neuroblastoma      Past Medical History:     Past Medical History:   Diagnosis Date    Chickenpox        Patient Active Problem List   Diagnosis    Anxiety and depression    Dietary calcium deficiency    Lumbar herniated disc    Vitamin D deficiency    Intractable migraine without aura and with status migrainosus    Chronic daily headache    Chronic migraine without aura without status migrainosus, not intractable    Nausea       Medications:      Current Outpatient Medications   Medication Sig Dispense Refill    Cholecalciferol (VITAMIN D3) 2000 units TABS Take by mouth      clonazePAM (KlonoPIN) 0 5 mg tablet Take 0 5 mg by mouth daily  3    Galcanezumab-gnlm 120 MG/ML SOAJ Inject 120 mg under the skin every 30 (thirty) days 1 pen 11    Magnesium Oxide -Mg Supplement 400 MG CAPS Take by mouth      Melatonin 3 MG CAPS Take 3 mg by mouth      ondansetron (ZOFRAN-ODT) 8 mg disintegrating tablet Take 1 tablet (8 mg total) by mouth every 8 (eight) hours as needed for nausea or vomiting 20 tablet 0    Riboflavin (B2 PO) Take by mouth      topiramate (TOPAMAX) 50 MG tablet 1 in am and two in pm for 7 days then two twice a day after that   120 tablet 2    fluticasone-salmeterol (ADVAIR DISKUS) 500-50 mcg/dose inhaler inhale 2 puff by inhalation route 2 times every day in the morning and evening approximately 12 hours apart      methylPREDNISolone 4 MG tablet therapy pack Use as directed on package 21 tablet 0    prochlorperazine (COMPAZINE) 10 mg tablet Take 1 tablet (10 mg total) by mouth every 6 (six) hours as needed for nausea or vomiting 12 tablet 3    rizatriptan (MAXALT) 10 MG tablet        No current facility-administered medications for this visit  Allergies:       Allergies   Allergen Reactions    Lyrica [Pregabalin]      Other reaction(s): Suicidal ideation       Family History:     Family History   Problem Relation Age of Onset    Hypertension Father     Breast cancer Maternal Grandmother     Stroke Maternal Grandmother     Cancer Maternal Grandfather         with unknown primary sit     Breast cancer Paternal Grandmother     Other Son         Neuroblastoma of abdomen    Breast cancer Maternal Aunt     Ovarian cancer Maternal Aunt         at age 28    Breast cancer Paternal Aunt         dx at age 52       Social History:     Social History     Socioeconomic History    Marital status: /Civil Union     Spouse name: Not on file    Number of children: Not on file    Years of education: associates degree    Highest education level: Not on file   Occupational History    Occupation: Patent processor   Social Needs    Financial resource strain: Not on file    Food insecurity:     Worry: Not on file     Inability: Not on file    Transportation needs:     Medical: Not on file     Non-medical: Not on file   Tobacco Use    Smoking status: Never Smoker    Smokeless tobacco: Never Used   Substance and Sexual Activity    Alcohol use: No    Drug use: No    Sexual activity: Not on file   Lifestyle    Physical activity:     Days per week: Not on file     Minutes per session: Not on file    Stress: Not on file   Relationships    Social connections:     Talks on phone: Not on file     Gets together: Not on file     Attends [employer]an service: Not on file     Active member of club or organization: Not on file     Attends meetings of clubs or organizations: Not on file     Relationship status: Not on file    Intimate partner violence:     Fear of current or ex partner: Not on file     Emotionally abused: Not on file     Physically abused: Not on file     Forced sexual activity: Not on file Other Topics Concern    Not on file   Social History Narrative    Lack of exercise     Sikh affiliation - non [employer]ar[employer] Orthodox    Hobbies: reading, bike rides, volunteering    Employed:          Objective:     Physical Exam:                                                                 Vitals:            Constitutional:    /74 (BP Location: Right arm, Patient Position: Sitting, Cuff Size: Adult)   Pulse 70   Resp 16   Ht 5' 4" (1 626 m)   Wt 86 3 kg (190 lb 4 8 oz)   BMI 32 66 kg/m²   BP Readings from Last 3 Encounters:   12/30/19 112/74   10/14/19 105/69   07/03/19 100/70     Pulse Readings from Last 3 Encounters:   12/30/19 70   10/14/19 60   07/03/19 70         Well developed, well nourished, well groomed  No dysmorphic features  HEENT:  Normocephalic atraumatic  No meningismus  Oropharynx is clear and moist  No oral mucosal lesions  Chest:  Respirations regular and unlabored  Cardiovascular:  Regular rate, intact distal pulses  Distal extremities warm without palpable edema or tenderness, no observed significant swelling  Musculoskeletal:  Full range of motion  (see below under neurologic exam for evaluation of motor function and gait)   Skin:  warm and dry, not diaphoretic  No apparent birthmarks or stigmata of neurocutaneous disease  Psychiatric:  Depressed mood and affect, tearful       Neurological Examination:     Mental status/cognitive function:   Orientated to time, place and person  Recent and remote memory intact  Attention span and concentration as well as fund of knowledge are appropriate for age  Normal language and spontaneous speech  Cranial Nerves:  III, IV, VI-Pupils were equal, round, and reactive to light  Extraocular movements were full and conjugate without nystagmus   conjugate gaze, normal smooth pursuits, normal saccades   VII-facial expression symmetric, intact forehead wrinkle, strong eye closure, symmetric smile VIII-hearing grossly intact bilaterally   Motor Exam: symmetric bulk throughout  no atrophy, fasciculations or abnormal movements noted  Coordination:  no apparent dysmetria, ataxia or tremor noted  Gait: steady casual gait       Pertinent lab results:   07/30/2019:  CMP and CBC unremarkable  Vitamin-D 45 5, B12 453, TSH 1 4     10/2018 Vit D 18 3 - replacement started     11/13/2018:  CMP within normal limits  B12 393  TSH 0 954     [facility]:  Personally reviewed and unremarkable agree with radiology read of normal      MRI Brain with and without contrast 11/24/2018  Several small white matter hyperintensities are seen on T2 and FLAIR imaging within the frontal lobes without mass effect, diffusion abnormality or abnormal enhancement   These are nonspecific in a patient of this age and white matter lesions have been described within the frontal lobes in patients with chronic migraine headaches         Review of Systems:   ROS obtained by medical assistant Personally reviewed and updated if indicated  Review of Systems   Constitutional: Negative  Negative for appetite change and fever  HENT: Negative  Negative for hearing loss, tinnitus, trouble swallowing and voice change  Eyes: Negative  Negative for photophobia and pain  Respiratory: Negative  Negative for shortness of breath  Cardiovascular: Negative  Negative for palpitations  Gastrointestinal: Positive for nausea  Negative for vomiting  Endocrine: Negative  Negative for cold intolerance and heat intolerance  Genitourinary: Negative  Negative for dysuria, frequency and urgency  Musculoskeletal: Negative  Negative for myalgias and neck pain  Skin: Negative  Negative for rash  Allergic/Immunologic: Negative  Neurological: Positive for headaches  Negative for dizziness, tremors, seizures, syncope, facial asymmetry, speech difficulty, weakness, light-headedness and numbness  Hematological: Negative   Does not bruise/bleed easily  Psychiatric/Behavioral: Negative  Negative for confusion, hallucinations and sleep disturbance  I have spent 30 minutes with Patient  today in which greater than 50% of this time was spent in counseling/coordination of care regarding Prognosis, Risks and benefits of tx options, Intructions for management, Patient education, Importance of tx compliance, Risk factor reductions and Impressions        Author:  Tabitha Beard MD 12/30/2019 12:59 PM

## 2020-01-18 DIAGNOSIS — G43.709 CHRONIC MIGRAINE WITHOUT AURA WITHOUT STATUS MIGRAINOSUS, NOT INTRACTABLE: ICD-10-CM

## 2020-01-20 RX ORDER — TOPIRAMATE 25 MG/1
TABLET ORAL
Qty: 360 TABLET | Refills: 0 | OUTPATIENT
Start: 2020-01-20

## 2021-03-31 DIAGNOSIS — Z23 ENCOUNTER FOR IMMUNIZATION: ICD-10-CM

## 2021-06-03 ENCOUNTER — APPOINTMENT (OUTPATIENT)
Dept: URGENT CARE | Facility: CLINIC | Age: 41
End: 2021-06-03
Payer: OTHER MISCELLANEOUS

## 2021-06-03 PROCEDURE — G0382 LEV 3 HOSP TYPE B ED VISIT: HCPCS | Performed by: PHYSICIAN ASSISTANT

## 2021-06-03 PROCEDURE — 99283 EMERGENCY DEPT VISIT LOW MDM: CPT | Performed by: PHYSICIAN ASSISTANT

## 2021-06-14 ENCOUNTER — APPOINTMENT (OUTPATIENT)
Dept: URGENT CARE | Facility: CLINIC | Age: 41
End: 2021-06-14
Payer: OTHER MISCELLANEOUS

## 2021-06-14 PROCEDURE — 99213 OFFICE O/P EST LOW 20 MIN: CPT

## 2021-06-21 ENCOUNTER — APPOINTMENT (OUTPATIENT)
Dept: URGENT CARE | Facility: CLINIC | Age: 41
End: 2021-06-21
Payer: OTHER MISCELLANEOUS

## 2021-06-21 PROCEDURE — 99213 OFFICE O/P EST LOW 20 MIN: CPT | Performed by: PHYSICIAN ASSISTANT

## 2021-07-02 ENCOUNTER — APPOINTMENT (OUTPATIENT)
Dept: URGENT CARE | Facility: CLINIC | Age: 41
End: 2021-07-02
Payer: OTHER MISCELLANEOUS

## 2021-07-02 PROCEDURE — 99213 OFFICE O/P EST LOW 20 MIN: CPT

## 2021-07-14 ENCOUNTER — APPOINTMENT (OUTPATIENT)
Dept: URGENT CARE | Facility: CLINIC | Age: 41
End: 2021-07-14

## 2021-07-14 PROCEDURE — 99213 OFFICE O/P EST LOW 20 MIN: CPT

## 2021-10-24 ENCOUNTER — OFFICE VISIT (OUTPATIENT)
Dept: URGENT CARE | Facility: CLINIC | Age: 41
End: 2021-10-24
Payer: COMMERCIAL

## 2021-10-24 VITALS
TEMPERATURE: 98 F | RESPIRATION RATE: 16 BRPM | OXYGEN SATURATION: 99 % | HEIGHT: 65 IN | WEIGHT: 203.2 LBS | SYSTOLIC BLOOD PRESSURE: 120 MMHG | BODY MASS INDEX: 33.85 KG/M2 | DIASTOLIC BLOOD PRESSURE: 80 MMHG | HEART RATE: 64 BPM

## 2021-10-24 DIAGNOSIS — G43.011 INTRACTABLE MIGRAINE WITHOUT AURA AND WITH STATUS MIGRAINOSUS: Primary | ICD-10-CM

## 2021-10-24 DIAGNOSIS — N92.6 MENSTRUAL PERIOD LATE: ICD-10-CM

## 2021-10-24 LAB — SL AMB POCT URINE HCG: NEGATIVE

## 2021-10-24 PROCEDURE — 96372 THER/PROPH/DIAG INJ SC/IM: CPT | Performed by: PHYSICIAN ASSISTANT

## 2021-10-24 PROCEDURE — 81025 URINE PREGNANCY TEST: CPT | Performed by: PHYSICIAN ASSISTANT

## 2021-10-24 PROCEDURE — 99213 OFFICE O/P EST LOW 20 MIN: CPT | Performed by: PHYSICIAN ASSISTANT

## 2021-10-24 RX ORDER — ESCITALOPRAM OXALATE 20 MG/1
20 TABLET ORAL DAILY
COMMUNITY
Start: 2021-10-12

## 2021-10-24 RX ORDER — MIRTAZAPINE 7.5 MG/1
7.5 TABLET, FILM COATED ORAL
COMMUNITY
Start: 2021-10-04

## 2021-10-24 RX ORDER — IBUPROFEN 800 MG/1
800 TABLET ORAL EVERY 6 HOURS PRN
Qty: 15 TABLET | Refills: 0 | Status: SHIPPED | OUTPATIENT
Start: 2021-10-24

## 2021-10-24 RX ORDER — RISPERIDONE 1 MG/1
1 TABLET, FILM COATED ORAL EVERY EVENING
COMMUNITY
Start: 2021-10-20

## 2021-10-24 RX ORDER — KETOROLAC TROMETHAMINE 30 MG/ML
60 INJECTION, SOLUTION INTRAMUSCULAR; INTRAVENOUS ONCE
Status: COMPLETED | OUTPATIENT
Start: 2021-10-24 | End: 2021-10-24

## 2021-10-24 RX ADMIN — KETOROLAC TROMETHAMINE 60 MG: 30 INJECTION, SOLUTION INTRAMUSCULAR; INTRAVENOUS at 14:58

## 2021-11-09 ENCOUNTER — OFFICE VISIT (OUTPATIENT)
Dept: OBGYN CLINIC | Facility: CLINIC | Age: 41
End: 2021-11-09
Payer: COMMERCIAL

## 2021-11-09 VITALS
HEIGHT: 65 IN | WEIGHT: 206.2 LBS | BODY MASS INDEX: 34.35 KG/M2 | DIASTOLIC BLOOD PRESSURE: 82 MMHG | SYSTOLIC BLOOD PRESSURE: 120 MMHG

## 2021-11-09 DIAGNOSIS — G43.709 CHRONIC MIGRAINE WITHOUT AURA WITHOUT STATUS MIGRAINOSUS, NOT INTRACTABLE: Primary | ICD-10-CM

## 2021-11-09 DIAGNOSIS — Z12.31 ENCOUNTER FOR SCREENING MAMMOGRAM FOR MALIGNANT NEOPLASM OF BREAST: ICD-10-CM

## 2021-11-09 PROCEDURE — 99203 OFFICE O/P NEW LOW 30 MIN: CPT | Performed by: OBSTETRICS & GYNECOLOGY

## 2021-11-09 RX ORDER — UBROGEPANT 100 MG/1
TABLET ORAL
COMMUNITY
Start: 2021-10-28 | End: 2022-06-15

## 2021-11-10 ENCOUNTER — TELEPHONE (OUTPATIENT)
Dept: OBGYN CLINIC | Facility: CLINIC | Age: 41
End: 2021-11-10

## 2021-11-17 ENCOUNTER — TELEPHONE (OUTPATIENT)
Dept: OBGYN CLINIC | Facility: CLINIC | Age: 41
End: 2021-11-17

## 2021-12-27 ENCOUNTER — HOSPITAL ENCOUNTER (OUTPATIENT)
Dept: MAMMOGRAPHY | Facility: IMAGING CENTER | Age: 41
Discharge: HOME | End: 2021-12-27
Payer: COMMERCIAL

## 2021-12-27 VITALS — HEIGHT: 65 IN | WEIGHT: 213 LBS | BODY MASS INDEX: 35.49 KG/M2

## 2021-12-27 DIAGNOSIS — Z12.31 ENCOUNTER FOR SCREENING MAMMOGRAM FOR MALIGNANT NEOPLASM OF BREAST: ICD-10-CM

## 2021-12-27 PROCEDURE — 77067 SCR MAMMO BI INCL CAD: CPT

## 2021-12-27 PROCEDURE — 77063 BREAST TOMOSYNTHESIS BI: CPT

## 2022-02-09 ENCOUNTER — ANNUAL EXAM (OUTPATIENT)
Dept: OBGYN CLINIC | Facility: CLINIC | Age: 42
End: 2022-02-09
Payer: COMMERCIAL

## 2022-02-09 VITALS
SYSTOLIC BLOOD PRESSURE: 112 MMHG | BODY MASS INDEX: 36.42 KG/M2 | WEIGHT: 218.6 LBS | DIASTOLIC BLOOD PRESSURE: 62 MMHG | HEIGHT: 65 IN

## 2022-02-09 DIAGNOSIS — Z12.31 ENCOUNTER FOR SCREENING MAMMOGRAM FOR MALIGNANT NEOPLASM OF BREAST: ICD-10-CM

## 2022-02-09 DIAGNOSIS — G43.839 INTRACTABLE MENSTRUAL MIGRAINE WITHOUT STATUS MIGRAINOSUS: ICD-10-CM

## 2022-02-09 DIAGNOSIS — Z01.419 ENCOUNTER FOR ANNUAL ROUTINE GYNECOLOGICAL EXAMINATION: Primary | ICD-10-CM

## 2022-02-09 DIAGNOSIS — Z11.51 SCREENING FOR HUMAN PAPILLOMAVIRUS (HPV): ICD-10-CM

## 2022-02-09 PROCEDURE — S0612 ANNUAL GYNECOLOGICAL EXAMINA: HCPCS | Performed by: OBSTETRICS & GYNECOLOGY

## 2022-02-09 PROCEDURE — G0476 HPV COMBO ASSAY CA SCREEN: HCPCS | Performed by: OBSTETRICS & GYNECOLOGY

## 2022-02-09 PROCEDURE — G0145 SCR C/V CYTO,THINLAYER,RESCR: HCPCS | Performed by: OBSTETRICS & GYNECOLOGY

## 2022-02-09 RX ORDER — TEMAZEPAM 15 MG/1
CAPSULE ORAL
COMMUNITY
Start: 2022-01-19 | End: 2022-03-24

## 2022-02-10 PROBLEM — E58 DIETARY CALCIUM DEFICIENCY: Status: RESOLVED | Noted: 2017-04-12 | Resolved: 2022-02-10

## 2022-02-10 RX ORDER — ACETAMINOPHEN AND CODEINE PHOSPHATE 120; 12 MG/5ML; MG/5ML
1 SOLUTION ORAL DAILY
Qty: 84 TABLET | Refills: 3 | Status: SHIPPED | OUTPATIENT
Start: 2022-02-10

## 2022-02-10 NOTE — PROGRESS NOTES
Assessment/Plan:    1  Encounter for annual routine gynecological examination    - Liquid-based pap, screening    2  Screening for human papillomavirus (HPV)    - Liquid-based pap, screening    3  Encounter for screening mammogram for malignant neoplasm of breast    - Mammo screening bilateral w 3d & cad; Future    4  Intractable menstrual migraine without status migrainosus    - norethindrone (Ortho Micronor) 0 35 MG tablet; Take 1 tablet (0 35 mg total) by mouth daily  Dispense: 84 tablet; Refill: 3        Subjective      Nell Light is a 42 y o  female who presents for annual exam  Periods have been absent of Micronor  She reports improvement in her HA's  Dysmenorrhea:none  Cyclic symptoms: none  No spotting or discharge  The patient denies any urinary or sexual concerns  Current contraception: vasectomy  History of abnormal Pap smear: no  Regular self breast exam: yes  History of abnormal mammogram: no  History of abnormal lipids: no  Menstrual History:  OB History        3    Para   3    Term   3            AB        Living   2       SAB        IAB        Ectopic        Multiple        Live Births   3           Obstetric Comments   2000 - 39 wks, , male, 7#, [facility], breast fed x 14 months;  age 8 neuroblastoma  2003 - 43 weeks, , male, 8#, [facility], breast fed x 6 months  9/15/2006 - 38 weeks, nvsd, female, 7#, [facility], breast fed x 6 weeks     Menarche age 5            Patient's last menstrual period was 2021 (exact date)       Past Medical History:   Diagnosis Date    Anxiety     BRCA1 negative     Unsure which BRCA she was tested for    BRCA2 negative     Unsure which BRCA she was tested for    Chickenpox     Depression     Migraines        Family History   Problem Relation Age of Onset    Hypertension Father     Breast cancer Maternal Grandmother         unknown age   [facility] Stroke Maternal Grandmother     Cancer Maternal Grandfather         with unknown primary sit     Breast cancer Paternal Grandmother         unknown age   Claire Godwin Other Son         Neuroblastoma of abdomen    Ovarian cancer Maternal Aunt         at age 28    Breast cancer Maternal Aunt 28    Breast cancer Paternal Aunt         dx at age 52       The following portions of the patient's history were reviewed and updated as appropriate: allergies, current medications, past family history, past medical history, past social history, past surgical history and problem list     Review of Systems  Pertinent items are noted in HPI  Objective      /62 (BP Location: Right arm, Patient Position: Sitting, Cuff Size: Large)   Ht 5' 4 5" (1 638 m)   Wt 99 2 kg (218 lb 9 6 oz)   LMP 11/20/2021 (Exact Date)   BMI 36 94 kg/m²     General:   alert and oriented, in no acute distress   Heart:    Breasts: regular rate and rhythm, S1, S2 normal, no murmur, click, rub or gallop   appear normal, no suspicious masses, no skin or nipple changes or axillary nodes     Lungs: clear to auscultation bilaterally   Abdomen: soft, non-tender, without masses or organomegaly   Vulva: normal   Vagina: normal mucosa   Cervix: no lesions   Uterus: normal size, mobile, non-tender   Adnexa: normal adnexa and no mass, fullness, tenderness

## 2022-02-11 LAB
HPV HR 12 DNA CVX QL NAA+PROBE: NEGATIVE
HPV16 DNA CVX QL NAA+PROBE: NEGATIVE
HPV18 DNA CVX QL NAA+PROBE: NEGATIVE

## 2022-02-16 LAB
LAB AP GYN PRIMARY INTERPRETATION: NORMAL
LAB AP LMP: NORMAL
Lab: NORMAL

## 2022-03-17 NOTE — PROGRESS NOTES
[facility] [facility] Consult - Follow up   PATIENT:  Chiqui Alfaro  MRN:  [id number]  :  1980  DATE OF SERVICE:  3/18/2022  REFERRED BY: No ref  provider found  PMD: Chasity Ochoa MD    Assessment/Plan:   Tanya Castellon a 84 G  o  female with a past medical history of PTSD, anxiety, depression, vitamin-D deficiency, herniated lumbar disc, BMI over 35, referred here for evaluation of headache/migraine    Initial visit 2018     Chronic migraine without aura without status migrainosus   She reports throughout her life she really only had headaches about once a month until 3 years ago at age 27 she started developing daily headaches and migraines   Migraines typically a few days prior to menses  Typically her headaches and migraines are right frontal/temporal/retro-orbital and sometimes go down the neck with associated symptoms of nausea, phonophobia, Osmophobia, lightheadedness and problems with concentration   - as of 18: 6-9 migraine days a month, chronic daily headache  - as of 2018:  Topiramate has cut  chronic daily headaches in half  - As of 3/27/19: migraines 3-4 in a month, daily headaches are gone  - as of 2019:  At least 1 migraine a month, but lasting up to 1-2 weeks  - as of 10/14/2019: headaches 15/30 days, 1 week migraine a month  - as of 19:  Chronic headaches and migraines continue on topiramate to 100 mg b i d  and weaned down  Had 1 emgality shot without improvement will continue for 3 month trial   Her psychiatrist stopped all her psychiatry meds except for Klonopin (Abilify, lamotrigine, Lexapro, Ativan)  Canceled appointment with our headache specialist Dr Dougie Quach despite my recommendation  She plans to look into medical marijuana  Steroids seemed to be the only thing that helps with migraines and felt Medrol Dosepak help more than dexamethasone  Trial of prochlorperazine which has not tried yet    - as of 3/18/2022: she has not been back in over 2 years  Was dealing with adjusting psychiatric meds  Over 25 headache or migraine days per month  referral to Sleep Medicine to rule out sleep apnea contributing  Trial of emgality for prevention for at least 3 month trial        Workup:  -Zachary  and without contrast 11/24/2018: Several small white matter hyperintensities are seen on T2 and FLAIR imaging within the frontal lobes without mass effect, diffusion abnormality or abnormal enhancement   These are nonspecific in a patient of this age and white matter lesions have been described within the frontal lobes in patients with chronic migraine headaches       Preventative:  - We discussed headache hygiene and lifestyle factors which may include improve her headaches including hydration, exercise, diet and weight loss     - on through other providers:  Temazepam/Restoril, risperidone, mirtazapine/Remeron, escitalopram/Lexapro, clonazepam/Klonopin   - trial of emgality  Discussed proper use, possible side effects and risks  - past:  topiramate up to 100 mg b i d  Did not help significantly,  Depakote  made it worse, amitriptyline, gabapentin increased anxiety, Lexapro use prior for mood without effect on migraine, prazosin for PTSD, Vistaril, Lyrica caused SI, cymbalta helped mood but caused headaches, SI from lamotrigine, seroquel SE, Buspar, blood pressure is consistently low and would not tolerate BP med, Temazepam/Restoril, risperidone, mirtazapine/Remeron, escitalopram/Lexapro, clonazepam/Klonopin, emgality for short course - only two shots  - future options: alternative CGRP, botox      Abortive:  - discussed not taking more than 3 days a week any abortive medication to avoid medication overuse/rebound  - on through other providers: zofran, ubrelvy  - Decadron 2 mg 1 tab for 1-5  days for unrelenting migraine in moderation  Discussed proper use, possible side effects and risks    - past:  Indomethacin caused anxiety attack, rizatriptan made her too sleepy, toradol IM and PO did not help, Sumatriptan has worked a few times however most the time this makes her too sleepy to continue with her day   Depakote for 5 nights reportedly made her migraine worse, rizatriptan, prochlorperazine does not recall  -  past/helpeds: dexamethasone 2 mg for 3-5 days that helped get rid of a migraine, she feels Medrol Dosepak to help the most could use again in future   - future options: could consider trial for 5 days of Depakote or dexamethasone for prolonged migraine, reyvow, nurtec      Patient instructions       Referral to sleep medicine for sleep study [phone]  - reasons - rule out sleep apnea, problems falling and staying asleep without meds, waking at night, chronic headaches     Headache/migraine treatment:   Abortive medications (for immediate treatment of a headache): It is ok to take ibuprofen, acetaminophen or naproxen (Advil, Tylenol,  Aleve, Excedrin) if they help your headaches you should limit these to No more than 3 times a week to avoid medication overuse/rebound headaches       Ubrelvy 100 mg    Decadron 2 mg 1 tab for 1-5  days for unrelenting migraine in moderation    Prescription preventive medications for headaches/migraines   (to take every day to help prevent headaches - not to take at the time of headache):    Emgality/Galcanezumab - the 1st dose is 240 mg loading dose of 2 consecutive 120 mg injections  Thereafter, 120 mg injections every 30 days     READ INSTRUCTIONS that come with the medication  REFRIGERATE  Keep out of direct sunlight  Prior to administration, allow to come to room temperature for 30 minutes  Do not warm using a heat source (eg, microwave or hot water)  Do not shake  Administer in preferably abdomen (avoiding 2 inches around the navel), thigh, upper arm, or buttocks avoiding areas of skin that are tender, bruised, red or hard   Deliver entire contents of single-use prefilled pen or syringe  *Typically these types of medications take time untill you see the benefit, although some may see improvement in days, often it may take weeks, especially if the medication is being titrated up to a beneficial level  Please contact us if there are any concerns or questions regarding the medication         Lifestyle Recommendations:  [x] SLEEP - Maintain a regular sleep schedule: Adults need at least 7-8 hours of uninterrupted a night  Maintain good sleep hygiene:  Going to bed and waking up at consistent times, avoiding excessive daytime naps, avoiding caffeinated beverages in the evening, avoid excessive stimulation in the evening and generally using bed primarily for sleeping   One hour before bedtime would recommend turning lights down lower, decreasing your activity (may read quietly, listen to music at a low volume)  When you get into bed, should eliminate all technology (no texting, emailing, playing with your phone, iPad or tablet in bed)  [x] HYDRATION - Maintain good hydration   Drink  2L of fluid a day (4 typical small water bottles)  [x] DIET - Maintain good nutrition  In particular don't skip meals and try and eat healthy balanced meals regularly  [x] TRIGGERS - Look for other triggers and avoid them: Limit caffeine to 1-2 cups a day or less  Avoid dietary triggers that you have noticed bring on your headaches (this could include aged cheese, peanuts, MSG, aspartame and nitrates)      Education and Follow-up  [x] Please call with any questions or concerns  [x] Follow up 3 months with Dr Judy Bailey, 6 months with Mason Max            CC: We had the pleasure of evaluating Yara Weir neurological consultation today   she is a   right handed female who presents today for evaluation of headaches       History of Present Illness:   Interval history as of 3/18/2022  - denies any new or concerning neurologic symptoms since last visit  - Last seen over 2 years ago and at that time was instructed to follow-up in 2 months  - Patient was placed on birth control (norethindrone) after 12/30/2019 visit which helped to reduce headaches- felt it reduced headaches from 12 times a month to 6-8 times a month  - Patient not able to followup previously due to trying to control anxiety and depression first with psychiatrist   - Patient has no other types of headaches  - sleep 8-9 hours, problems asleep, mirtazapine helps her sleep through the night, before 2 times a night, never had a sleep study    Headaches and migraines   Since last visit, headaches were stable  Patient describes right temporal sided dull to sometimes throbbing pain which are 6-8 times a month  Patient states having regular headaches which are also right temporal sided but only dull and less intense which are nearly daily, which last for around 4 hours    Headache free days a 1 a week  Migraines can last 2-3 days    Preventative:   - Currently not on any preventive medications for headache  Through other providers:  - Patient on mirtazapine 7 5mg daily for anxiety and depression  - Risperdal  1mg daily for anxiety and depression  - Lexapro 20mg daily for anxiety and depression  - Clonopin 0 5mg daily for anxiety and depression    Abortive:   - Patient takes Zofran which helps with the nausea  -Patient takes ubrelvy 100mg at onset of migraine and was helping initially but now about an hour or two or relief; Patient also tried taking 2 hours after and this doesn't help   Also noted fatigue with the [place]  Denies bothersome side effects     Interval history as of 12/30/19  - she cancelled follow up with Dr Danielle Wild, because she wants to do medical [facility]  - had 1 emgality shot without improvement  - mood has been up and down, overall worse anxiety, following with psychiatry who stopped all her meds  - victor m is a hard time for her, her son that she lost his Hever Kelly is Victor M menjivar  - started seeing a psychology again   - psych - trying to get off klonopin, ativan which she has been on for years   - stopped  Abilify, lamotrigine, duloxetine, Lexapro  - now only on klonopin, topiramate 100 mg BID and did not help   - dexamethasone 2 mg daily for 5 days worked a little, but medrol dose pack 11/23/19 helped more   - indomethacin is causing panic attacks, lower dose did not work     - rizatriptan does not seem to be working most of the time and knocks her out and migraine comes back the next day   - taking melatonin, magnesium    Interval history as of 10/14/2019  - 7/30/2019:  CMP and CBC unremarkable  Vitamin-D 45 5, B12 453, TSH 1 4     Mood/Psych  - over the summer was on lamictal for 2 months since last visit through psychiatry  Was ok on 25 mg and then increased to 50 mg - had suicidal thoughts - no longer had those  - seroquel started - unknown dose - woke up in the middle of night and felt very dizzy (seroquel, topiramate, lexapro)  - has discussed with psychiatrist      Headaches/migraines  - last visit increased topiramate 50 mg b i d  Gradually to 75 mg b i d  - feels good on it  - now only getting migraines right before or during period   - indomethacin was working and  last too times had a panic attck  - rizatriptan does not seem to be working most of the time and knocks her out and migraine comes back the next day      - taking lexapro   - taking the melatonin, rb, mg        Interval history as of 07/03/2019:  - 05/10/2019: Swathi Cordon called in reporting migraines since 05/07   Throbbing dull right temporal, phonophobia   Nonresponsive to naproxen, Triptan x2, ketorolac/Toradol - sent Depakote for 5 to evenings to pharmacy  -05/12/2019 patient called in reporting on day 3 of Depakote without improvement  - 05/13/2019 migraine not relieved by Depakote, possibly worse - therefore sent dexamethasone 2 mg p o   Daily with breakfast for 5 days helped in the past and in this case  (had been on antibiotics then)     -06/24/2019:  Patient called with migraine starting 6/17 - throbbing right temple/retro-orbital and go down neck  nausea-nonresponsive to rizatriptan, ketorolac/Toradol, requesting med for nausea   Prescribe prochlorperazine - which she did not take as she was concerned about it lowering her blood pressure, then prescribed ondansetron  - called 07/01/2019 reporting dexamethasone resolved migraine but came back soon after worsening chronic anxiety and depression     - in the past 3 months migraines worsened,   One major one in may lasting a week, better with steroids  6/17 to now started with period, mild improvement with rizatriptan a little, reports took 1 compazine and did not help much  - indomethacin has worked in the past     - continues topiramate 50 mg b i d   Cymbalta/duloxetine 40 mg daily, Ativan 1 mg daily, taking the melatonin, rb, mg  She is wondering if this stopped working     Interval history as of 03/27/2019  - 01/21/2019 refilled rizatriptan 10 mg, topiramate, Toradol 10 mg  - called in 03/07/2019 reporting migraine not responding to rizatriptan or Toradol - 2 mg dexamethasone worked  - has cut out diet soda which she used to take daily  - Daily headaches are gone  - rizatriptan does not seem to be working most of the time and knocks her out and migraine comes back the next day   - toradol does not help too much, but has been hesitant to take it   - migraines 3-4 times a month  - denies SE      Interval events as of 12/17/2018:   - has decreased daily OTC pain meds use, to once or twice in past 5 weeks   - topiramate has cut daily headaches in half  12/3/18: migraine treated with dexamethasone 1 mg daily for 3 days  - bad news yesterday, they get insurance through the state, topiramate will not be covered         Headaches started at what age? Started regularly at 29 years old  How often do the headaches occur?   - as of 11/2018 - Past 3 years daily headache and migraines (Before that once a month)  - As of 12/17/18: Chronic daily headaches - have been cut in half  6-9 migraine days a month - in past 5 weeks has had 8 (11/20, 26, 12/1,2,3,4,15,16)  - As of 3/27/19: migraines 3-4 in a month, daily headaches are gone  - as of 07/03/2019:  At least 1 migraine a month, but lasting up to 1-2 weeks  - as of 10/14/2019: headaches 15/30 days, migraine 1 week    What time of the day do the headaches start?   no particular time for migraines, headaches may wake up with them   How long do the headaches last?    3-4 days, as of 7/3/19 up to 1-2 weeks   Are you ever headache free?   No     Aura?  without aura    Has a prodrome where she can tell it is going to be a migraine by the intensity of which it comes on      Describe your usual headache pain quality?   dull, stabbing, throbbing and pressure - pounding the most  Where is your headache located?  Usually on the right frontal/temporal, when starting to go away goes to left frontal - these are every day headaches    Migraines - usually just on the right frontal/temporal, sometimes right occipital    - throbbing right temple/retro-orbital and go down neck      Does the pain Radiate?  no radiation of pain  What is the intensity of pain?  Normal 5, migraines 9  Associated symptoms:   [x] Nausea       [] Vomiting        [] Diarrhea         [] Insomnia           [] Stiff or sore neck          [x] Problems with concentration  [] Photophobia      [x]Phonophobia      [x] Osmophobia  [] Blurred vision   [x] Prefer quiet, dark room        [x] Light-headed or dizzy    [] Tinnitus      [] Red ear      [] Ptosis      [] Facial droop  [] Lacrimation  [] Nasal congestion/rhinorrhea   [] Flushing of face         [] Change in pupil size  [] Hands or feet tingle or feel numb/paresthesias       Number of days missed per month because of headaches:  Work days: has to [employer]  Social or Family activities:  frequently       Things that make the headache worse?  Movement - with migraine, coughing, sneezing, bending over,      Headache triggers:   stress, change in eating pattern, menses, weather changes   What time of the year do headaches occur more frequently?   do not seem to be related to any time of the year      Have you seen someone else for headaches or pain? No - pain doctor in the past for herniated disc in back - had cortisone injection and did not work, surgery helped   Have you had trigger point injection performed and how often? No  Have you had Botox injection performed and how often? No   Have you had epidural injections or transforaminal injections performed? No  Have you used CBD or THC for your headaches and how often?  No  Are you current pregnant or planning on getting pregnant?  No, husbands is fixed   Have you ever had any Brain imaging? Yes, MRI Brain      What medications do you take or have you taken for your headaches? ABORTIVE:   - OTC pain meds did not help - does not use more than 3 days per week  - rizatriptan helps a little, but too tired    - Tried ibuprofen and tylenol but does not help with even regular headaches  - 10/24/2021 Patient has taken toradol injection from the urgent care center but that did not help  - Medical marijuana also tried but not effective    Decadron 2 mg for 3-5 days has helped migraine  Indomethacin 50 mg t i d   For 3 days decreased chronic daily headache     Past:  Toradol IM and PO  did not help  Sumatriptan - put her to sleep     PREVENTIVE:   -topiramate   Mg, b2   Vit D      Past meds:  - amitriptyline - does not remember clearly, went off when pregnant   - Gabapentin in past  - increased anxiety   - depakote for 5 days worsened migraine she thinks  - lexapro and buspar for mood - no side effects, stopped after a few years  - prazosin the past for PTSD/nightmares  - Vistaril 25 mg in past  - lyrica in past made her have SI   - seroquel  - Temazepam/restoril for sleep but no longer needing it for sleep  - Ativan 0 5 mg- stopped because not as long lasting as klonopin  - cymbalta 20mg daily in the past- patient had weight gain        Alternative therapies used in the past for headaches? no other headache interventions have been tried     Neck pain?: No     LIFESTYLE  Sleep - averages 8-9 hours     Is your sleep restful?  Yes  How often do you get up at night?  No  Do you wake up with headaches? Yes  Do you snore while asleep? No  Have you been told that you stop breathing during sleeping? No  Do you wake up tired in the morning? No  Do you take frequent naps during the day? No unless during the weekends or during the migraine     Physical activity: none, but starting to do more walks with the dog  Water: 100 ounzes        Mood: stable  History of Bad anxiety and depression  - following with psychiatrist      Takes:   Clonopin 0 5 mg once in the AM  lexapro 20 mg daily - started about 2 years ago  Adderall 15mg BID- for ADD- started yesterday     In the past:  - lexapro, buspar for the longest - helped depression  prazosin the past for PTSD/nightmares  Vistaril 25 mg in past  Gabapentin in past   lyrica in past made her have SI   wellbutrin made her anxiety worse     The following portions of the patient's history were reviewed and updated as appropriate: allergies, current medications, past family history, past medical history, past social history, past surgical history and problem list      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  Education: Associates degree   Instructional school assistant  Lives with  marlon, son Jeff Cordon, daughter [de-identified]   13year old boy and 15year old daughter   Lost other son to cancer 10 years ago - neuroblastoma    Past Medical History:     Past Medical History:   Diagnosis Date    Anxiety     BRCA1 negative 2018    Unsure which BRCA she was tested for    BRCA2 negative 2018    Unsure which BRCA she was tested for    Chickenpox     Depression     Migraines        Patient Active Problem List   Diagnosis    Anxiety and depression    Lumbar herniated disc    Vitamin D deficiency    Intractable migraine without aura and with status migrainosus    Chronic daily headache    Chronic migraine without aura without status migrainosus, not intractable    Nausea       Medications:      Current Outpatient Medications   Medication Sig Dispense Refill    Cholecalciferol (VITAMIN D3) 2000 units TABS Take by mouth      clonazePAM (KlonoPIN) 0 5 mg tablet Take 0 5 mg by mouth daily  3    escitalopram (LEXAPRO) 20 mg tablet Take 20 mg by mouth daily      ibuprofen (MOTRIN) 800 mg tablet Take 1 tablet (800 mg total) by mouth every 6 (six) hours as needed for headaches 15 tablet 0    Magnesium Oxide -Mg Supplement 400 MG CAPS Take by mouth      Melatonin 3 MG CAPS Take 3 mg by mouth      mirtazapine (REMERON) 7 5 MG tablet Take 7 5 mg by mouth daily at bedtime      norethindrone (Ortho Micronor) 0 35 MG tablet Take 1 tablet (0 35 mg total) by mouth daily 84 tablet 3    ondansetron (ZOFRAN-ODT) 8 mg disintegrating tablet Take 1 tablet (8 mg total) by mouth every 8 (eight) hours as needed for nausea or vomiting 20 tablet 0    Riboflavin (B2 PO) Take by mouth      risperiDONE (RisperDAL) 1 mg tablet Take 1 mg by mouth every evening      Ubrelvy 100 MG tablet TAKE 1 TABLET BY MOUTH ONCE  MAY REPEAT DOSE ONCE AFTER TWO HOURS IF NEEDED   MAX 2/24 HOURS      amphetamine-dextroamphetamine (ADDERALL) 15 MG tablet Take 1 tablet by mouth 2 (two) times a day      dexamethasone (DECADRON) 2 mg tablet Decadron 2 mg 1 tab for 1-5  Days with breakfast for unrelenting migraine in moderation 5 tablet 0    fluticasone-salmeterol (ADVAIR DISKUS) 500-50 mcg/dose inhaler inhale 2 puff by inhalation route 2 times every day in the morning and evening approximately 12 hours apart (Patient not taking: Reported on 10/24/2021)      Galcanezumab-gnlm 120 MG/ML SOAJ Inject 240 mg under the skin once for 1 dose For just the first month loading dose, followed by 1 injection monthly on separate prescription  2 mL 0    Galcanezumab-gnlm 120 MG/ML SOAJ Inject 120 mg under the skin every 30 (thirty) days Following the first month loading dose of 2 pens  1 mL 11    prochlorperazine (COMPAZINE) 10 mg tablet Take 1 tablet (10 mg total) by mouth every 6 (six) hours as needed for nausea or vomiting (Patient not taking: Reported on 10/24/2021) 12 tablet 3    rizatriptan (MAXALT) 10 MG tablet  (Patient not taking: Reported on 10/24/2021 )      temazepam (RESTORIL) 15 mg capsule TAKE 1 TO 2 CAPSULES BY MOUTH AT BEDTIME AS NEEDED FOR SLEEP (Patient not taking: Reported on 3/18/2022)       No current facility-administered medications for this visit  Allergies:       Allergies   Allergen Reactions    Lyrica [Pregabalin]      Other reaction(s): Suicidal ideation       Family History:     Family History   Problem Relation Age of Onset    Hypertension Father     Breast cancer Maternal Grandmother         unknown age   Dilshad Abt Stroke Maternal Grandmother     Cancer Maternal Grandfather         with unknown primary sit     Breast cancer Paternal Grandmother         unknown age   Dilshad Abt Other Son         Neuroblastoma of abdomen    Ovarian cancer Maternal Aunt         at age 28    Breast cancer Maternal Aunt 28    Breast cancer Paternal Aunt         dx at age 52       Social History:     Social History     Socioeconomic History    Marital status: /Civil Union     Spouse name: Not on file    Number of children: Not on file    Years of education: associates degree    Highest education level: Not on file   Occupational History    Occupation: Patent processor-   Tobacco Use    Smoking status: Never Smoker    Smokeless tobacco: Never Used   Vaping Use    Vaping Use: Never used   Substance and Sexual Activity    Alcohol use: No    Drug use: No    Sexual activity: Yes     Partners: Male     Birth control/protection: Male Sterilization   Other Topics Concern    Not on file   Social History Narrative    Lack of exercise     [employer]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    Hobbies: reading, bike rides, volunteering    Employed:      Social Determinants of Health     Financial Resource Strain: Not on file   Food Insecurity: Not on file   Transportation Needs: Not on file   Physical Activity: Not on file   Stress: Not on file   Social Connections: Not on file   Intimate Partner Violence: Not on file   Housing Stability: Not on file         Objective:       Physical Exam:                                                                 Vitals:            Constitutional:    /79 (BP Location: Right arm, Patient Position: Sitting, Cuff Size: Adult)   Pulse 84   Wt 103 kg (226 lb 9 6 oz)   BMI 38 30 kg/m²   BP Readings from Last 3 Encounters:   03/18/22 124/79   02/09/22 112/62   11/09/21 120/82     Pulse Readings from Last 3 Encounters:   03/18/22 84   10/24/21 64   12/30/19 70         Well developed, well nourished, well groomed  No dysmorphic features  HEENT:  Normocephalic atraumatic  See neuro exam   Chest:  Respirations appear regular and unlabored  Cardiovascular:  no observed significant swelling  Musculoskeletal:  (see below under neurologic exam for evaluation of motor function and gait)   Skin:  warm and dry, not diaphoretic  Psychiatric:  Normal behavior and appropriate affect        Neurological Examination:     Mental status/cognitive function:   Recent and remote memory intact  Attention span and concentration as well as fund of knowledge are appropriate for age  Normal language and spontaneous speech  Cranial Nerves:  III, IV, VI-Pupils were equal, round  Extraocular movements were full and conjugate   VII-facial expression symmetric  VIII-hearing grossly intact bilaterally   Motor Exam: symmetric bulk throughout  no atrophy, fasciculations or abnormal movements noted     Coordination:  no apparent dysmetria, ataxia or tremor noted  Gait: steady casual gait        Pertinent lab results:   See EMR for recent labs    3/19/21 CBC unremarkable   Vit D 31  TSH normal  Vitamin B12 476  CMP unremarkable    07/30/2019:  CMP and CBC unremarkable  Vitamin-D 45 5, B12 453, TSH 1 4     10/2018 Vit D 18 3 - replacement started     11/13/2018:  CMP within normal limits  B12 393  TSH 0 954     [facility]  Personally reviewed and unremarkable agree with radiology read of normal      MRI Brain with and without contrast 11/24/2018  Several small white matter hyperintensities are seen on T2 and FLAIR imaging within the frontal lobes without mass effect, diffusion abnormality or abnormal enhancement   These are nonspecific in a patient of this age and white matter lesions have been described within the frontal lobes in patients with chronic migraine headaches    Review of Systems:   ROS obtained by medical assistant Personally reviewed and updated if indicated  I recommended PCP follow up for non neurologic problems  Review of Systems   Constitutional: Negative  Negative for appetite change and fever  HENT: Negative  Negative for hearing loss, tinnitus, trouble swallowing and voice change  Eyes: Negative  Negative for photophobia and pain  Respiratory: Negative  Negative for shortness of breath  Cardiovascular: Negative  Negative for palpitations  Gastrointestinal: Negative  Negative for nausea and vomiting  Endocrine: Negative  Negative for cold intolerance  Genitourinary: Negative  Negative for dysuria, frequency and urgency  Musculoskeletal: Negative  Negative for myalgias and neck pain  Skin: Negative  Negative for rash  Neurological: Positive for headaches  Negative for dizziness, tremors, seizures, syncope, facial asymmetry, speech difficulty, weakness, light-headedness and numbness  Hematological: Negative  Does not bruise/bleed easily     Psychiatric/Behavioral: Negative  Negative for confusion, hallucinations and sleep disturbance  All other systems reviewed and are negative  I have spent 25 minutes with Patient  today in which greater than 50% of this time was spent in counseling/coordination of care  I also spent 17 minutes non face to face for this patient the same day         Author:  Vincent Nathan MD 3/18/2022 4:28 PM

## 2022-03-18 ENCOUNTER — OFFICE VISIT (OUTPATIENT)
Dept: NEUROLOGY | Facility: CLINIC | Age: 42
End: 2022-03-18
Payer: COMMERCIAL

## 2022-03-18 VITALS
HEART RATE: 84 BPM | DIASTOLIC BLOOD PRESSURE: 79 MMHG | SYSTOLIC BLOOD PRESSURE: 124 MMHG | WEIGHT: 226.6 LBS | BODY MASS INDEX: 38.3 KG/M2

## 2022-03-18 DIAGNOSIS — G47.10 HYPERSOMNIA: ICD-10-CM

## 2022-03-18 DIAGNOSIS — G43.709 CHRONIC MIGRAINE WITHOUT AURA WITHOUT STATUS MIGRAINOSUS, NOT INTRACTABLE: Primary | ICD-10-CM

## 2022-03-18 PROCEDURE — 99215 OFFICE O/P EST HI 40 MIN: CPT | Performed by: PSYCHIATRY & NEUROLOGY

## 2022-03-18 RX ORDER — DEXTROAMPHETAMINE SACCHARATE, AMPHETAMINE ASPARTATE, DEXTROAMPHETAMINE SULFATE AND AMPHETAMINE SULFATE 3.75; 3.75; 3.75; 3.75 MG/1; MG/1; MG/1; MG/1
1 TABLET ORAL 2 TIMES DAILY
COMMUNITY
Start: 2022-03-16

## 2022-03-18 RX ORDER — DEXAMETHASONE 2 MG/1
TABLET ORAL
Qty: 5 TABLET | Refills: 0 | Status: SHIPPED | OUTPATIENT
Start: 2022-03-18 | End: 2022-04-06 | Stop reason: SDUPTHER

## 2022-03-18 NOTE — PATIENT INSTRUCTIONS
Referral to sleep medicine for sleep study [phone]  - reasons - rule out sleep apnea, problems falling and staying asleep without meds, waking at night, chronic headaches     Headache/migraine treatment:   Abortive medications (for immediate treatment of a headache): It is ok to take ibuprofen, acetaminophen or naproxen (Advil, Tylenol,  Aleve, Excedrin) if they help your headaches you should limit these to No more than 3 times a week to avoid medication overuse/rebound headaches       Ubrelvy 100 mg    Decadron 2 mg 1 tab for 1-5  days for unrelenting migraine in moderation      Prescription preventive medications for headaches/migraines   (to take every day to help prevent headaches - not to take at the time of headache):    Emgality/Galcanezumab - the 1st dose is 240 mg loading dose of 2 consecutive 120 mg injections  Thereafter, 120 mg injections every 30 days     READ INSTRUCTIONS that come with the medication  REFRIGERATE  Keep out of direct sunlight  Prior to administration, allow to come to room temperature for 30 minutes  Do not warm using a heat source (eg, microwave or hot water)  Do not shake  Administer in preferably abdomen (avoiding 2 inches around the navel), thigh, upper arm, or buttocks avoiding areas of skin that are tender, bruised, red or hard  Deliver entire contents of single-use prefilled pen or syringe  *Typically these types of medications take time untill you see the benefit, although some may see improvement in days, often it may take weeks, especially if the medication is being titrated up to a beneficial level  Please contact us if there are any concerns or questions regarding the medication         Lifestyle Recommendations:  [x] SLEEP - Maintain a regular sleep schedule: Adults need at least 7-8 hours of uninterrupted a night   Maintain good sleep hygiene:  Going to bed and waking up at consistent times, avoiding excessive daytime naps, avoiding caffeinated beverages in the evening, avoid excessive stimulation in the evening and generally using bed primarily for sleeping   One hour before bedtime would recommend turning lights down lower, decreasing your activity (may read quietly, listen to music at a low volume)  When you get into bed, should eliminate all technology (no texting, emailing, playing with your phone, iPad or tablet in bed)  [x] HYDRATION - Maintain good hydration   Drink  2L of fluid a day (4 typical small water bottles)  [x] DIET - Maintain good nutrition  In particular don't skip meals and try and eat healthy balanced meals regularly  [x] TRIGGERS - Look for other triggers and avoid them: Limit caffeine to 1-2 cups a day or less  Avoid dietary triggers that you have noticed bring on your headaches (this could include aged cheese, peanuts, MSG, aspartame and nitrates)      Education and Follow-up  [x] Please call with any questions or concerns     [x] Follow up 3 months with Dr Mery Raines, 6 months with Jordan Obrien

## 2022-03-18 NOTE — PROGRESS NOTES
Review of Systems   Constitutional: Negative  Negative for appetite change and fever  HENT: Negative  Negative for hearing loss, tinnitus, trouble swallowing and voice change  Eyes: Negative  Negative for photophobia and pain  Respiratory: Negative  Negative for shortness of breath  Cardiovascular: Negative  Negative for palpitations  Gastrointestinal: Negative  Negative for nausea and vomiting  Endocrine: Negative  Negative for cold intolerance  Genitourinary: Negative  Negative for dysuria, frequency and urgency  Musculoskeletal: Negative  Negative for myalgias and neck pain  Skin: Negative  Negative for rash  Neurological: Positive for headaches  Negative for dizziness, tremors, seizures, syncope, facial asymmetry, speech difficulty, weakness, light-headedness and numbness  Hematological: Negative  Does not bruise/bleed easily  Psychiatric/Behavioral: Negative  Negative for confusion, hallucinations and sleep disturbance  All other systems reviewed and are negative

## 2022-03-24 ENCOUNTER — OFFICE VISIT (OUTPATIENT)
Dept: SLEEP CENTER | Facility: CLINIC | Age: 42
End: 2022-03-24
Payer: COMMERCIAL

## 2022-03-24 ENCOUNTER — APPOINTMENT (OUTPATIENT)
Dept: LAB | Facility: CLINIC | Age: 42
End: 2022-03-24
Payer: COMMERCIAL

## 2022-03-24 ENCOUNTER — TELEPHONE (OUTPATIENT)
Dept: NEUROLOGY | Facility: CLINIC | Age: 42
End: 2022-03-24

## 2022-03-24 VITALS
HEART RATE: 84 BPM | BODY MASS INDEX: 37.65 KG/M2 | SYSTOLIC BLOOD PRESSURE: 123 MMHG | WEIGHT: 226 LBS | HEIGHT: 65 IN | DIASTOLIC BLOOD PRESSURE: 69 MMHG

## 2022-03-24 DIAGNOSIS — E61.1 IRON DEFICIENCY: ICD-10-CM

## 2022-03-24 DIAGNOSIS — R29.818 SUSPECTED SLEEP APNEA: Primary | ICD-10-CM

## 2022-03-24 DIAGNOSIS — G47.33 OSA (OBSTRUCTIVE SLEEP APNEA): ICD-10-CM

## 2022-03-24 DIAGNOSIS — G25.81 RESTLESS LEGS SYNDROME (RLS): ICD-10-CM

## 2022-03-24 DIAGNOSIS — G47.10 HYPERSOMNIA: ICD-10-CM

## 2022-03-24 LAB
FERRITIN SERPL-MCNC: 50 NG/ML (ref 8–388)
IRON SATN MFR SERPL: 26 % (ref 15–50)
IRON SERPL-MCNC: 99 UG/DL (ref 50–170)
TIBC SERPL-MCNC: 386 UG/DL (ref 250–450)

## 2022-03-24 PROCEDURE — 83550 IRON BINDING TEST: CPT

## 2022-03-24 PROCEDURE — 83540 ASSAY OF IRON: CPT

## 2022-03-24 PROCEDURE — 82728 ASSAY OF FERRITIN: CPT

## 2022-03-24 PROCEDURE — 99214 OFFICE O/P EST MOD 30 MIN: CPT | Performed by: PSYCHIATRY & NEUROLOGY

## 2022-03-24 PROCEDURE — 36415 COLL VENOUS BLD VENIPUNCTURE: CPT

## 2022-03-24 RX ORDER — PHENOL 1.4 %
AEROSOL, SPRAY (ML) MUCOUS MEMBRANE
COMMUNITY

## 2022-03-24 NOTE — ASSESSMENT & PLAN NOTE
It is possible but not certain that she has obstructive sleep apnea  I agree she should have sleep testing and we will plan for a home sleep test   She has obesity and possibly loud snoring which can suggest MONICA  In addition she had some degree of tiredness during the daytime  If obstructive sleep apnea is identified, I will order her a CPAP machine and follow up with her approximately 1 month after starting it  If her home sleep test is inconclusive, likely we will follow up with a polysomnogram     We discussed it is possible undetected obstructive sleep apnea is contributing to morning headaches  Of note, serum CO2 level was normal which lessens the risk of obesity hypoventilation syndrome, also a condition that can contribute to headaches

## 2022-03-24 NOTE — PROGRESS NOTES
Review of Systems      Genitourinary need to urinate more than twice a night   Cardiology none   Gastrointestinal frequent heartburn/acid reflux   Neurology frequent headaches and awaken with headache   Constitutional fatigue and weight change   Integumentary none   Psychiatry anxiety, depression and aggressiveness or irritability   Musculoskeletal none   Pulmonary none   ENT none   Endocrine frequent urination   Hematological none

## 2022-03-24 NOTE — PROGRESS NOTES
Assessment/Plan:      1  Suspected sleep apnea  Assessment & Plan: It is possible but not certain that she has obstructive sleep apnea  I agree she should have sleep testing and we will plan for a home sleep test   She has obesity and possibly loud snoring which can suggest MONIAC  In addition she had some degree of tiredness during the daytime  If obstructive sleep apnea is identified, I will order her a CPAP machine and follow up with her approximately 1 month after starting it  If her home sleep test is inconclusive, likely we will follow up with a polysomnogram     We discussed it is possible undetected obstructive sleep apnea is contributing to morning headaches  Of note, serum CO2 level was normal which lessens the risk of obesity hypoventilation syndrome, also a condition that can contribute to headaches  Orders:  -     Home Study; Future    2  Hypersomnia  Comments:  May be due to undetected obstructive sleep apnea as noted  I advised her to discontinue melatonin as that may be contributing to morning grogginess   Orders:  -     Ambulatory referral to Sleep Medicine    3  Restless legs syndrome (RLS)  Comments:  She has symptoms to suggest this  Medications can contribute including mirtazapine and risperidone  Would like to rule out iron deficiency  Orders:  -     Iron Panel (Includes Ferritin, Iron Sat%, Iron, and TIBC); Future    4  Iron deficiency  Comments:  I would like her to have iron studies checked to rule out iron deficiency  Orders:  -     Iron Panel (Includes Ferritin, Iron Sat%, Iron, and TIBC); Future    5  MONICA (obstructive sleep apnea)  -     Home Study; Future         Subjective:      Patient ID: Arely Hensley is a 42 y o  female  This is a 50-year-old woman referred by Dr Brandee Baron for hypersomnia and concern for possible obstructive sleep apnea- she recently had seen Dr Brandee Baron and noted a significantly increased frequency of migraine headaches    Osmin Jiang was concerned about possible obstructive sleep apnea contributing to increased headache frequency  She has not had a sleep study   She works as an  for 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  She feels tired during the day but does not fall asleep  Barby Bussing lives with her  and has 2 children at [facility],   She has been told that she has snoring heard outside the room by her daughter  She has not had  Witnessed apnea or gasping in sleep  She just recently started Adderall for ADHD but only in the past week - she takes 15 mg twice daily    Neyda needs 8-9 hours of sleep, with less she feels tired and groggy which she attributes to mirtazapine 7 5 mg, melatonin 10 mg, and risperidone 1 mg  She takes medication and gets in bed 830 pm, is asleep by 9 PM   She wakes once at 2-3 AM, is up for 15-20 minutes  She wakes to an alarm at 530 AM  To get her son up, sometimes she will fall back asleep and will gets up at 8 am    On weekends, she is asleep by 10 pm and is up at 8 AM   She goes to bed intentionally early to make sure she gets enough sleep because she feels groggy in the morning since starting the medications noted above without enough sleep    She is not refreshed when she awakens  Before starting Adderall she would nap on weekends  Typically she would not doze  No sleepwalking  She notes pain in her right leg, feeling like she has to move it, it is achy  Occurs several times a week  Wakes up with this, does not get it before bed  Has sciatica hx and lumbar disc disease    She has had nightmares 1-2 times a week  She drinks 8 oz coffee in the AM  Most days has soda or tea later in the day  Morrill Sleepiness Scale:     Sitting and reading: Slight chance of dozing  Watching TV: Slight chance of dozing  Sitting, inactive in a public place (e g  a theatre or a meeting):  Would never doze  As a passenger in a car for an hour without a break: High chance of dozing  Lying down to rest in the afternoon when circumstances permit: High chance of dozing  Sitting and talking to someone: Would never doze  Sitting quietly after a lunch without alcohol: Would never doze  In a car, while stopped for a few minutes in traffic: Would never doze  Total score: 8     The following portions of the patient's history were reviewed and updated as appropriate: allergies, current medications, past family history, past medical history, past social history, past surgical history, and problem list     Review of Systems      Objective:    inches      /69 (BP Location: Left arm, Patient Position: Sitting, Cuff Size: Large)   Pulse 84   Ht 5' 4 5" (1 638 m)   Wt 103 kg (226 lb)   BMI 38 19 kg/m²          Physical Exam  Constitutional:       Appearance: She is obese  HENT:      Mouth/Throat:      Comments: mallampati 4 airway, elongated soft palate, normal a hard palate, no tonsilar hypertrophy  Neurological:      Mental Status: She is alert               Data reviewed-consult note from Dr Marcus Guajardo, labs including [facility] [address] in 2021- CO2 was 26 mmol/L= lower risk of obesity hypoventilation syndrome, thyroid studies March 2021-negative, vitamin D 25-OH level March 2021-borderline, low normal

## 2022-03-24 NOTE — PATIENT INSTRUCTIONS
As we discussed, it is possible you have obstructive sleep apnea  I ordered a home sleep test  Please complete the test- if it confirms sleep apnea, I will order you a CPAP machine for home  The goal is to get to the point where you use CPAP all night, every night,  If the test does not show sleep apnea, we will discuss if we need to any further assessment,    I would recommend weening and eventually melatonin as that may be contributing to morning grogginess    Sleep Apnea   AMBULATORY CARE:   Sleep apnea  is a condition that causes you to stop breathing often during sleep  Types of sleep apnea:   · Obstructive sleep apnea (MONICA)  is the most common kind  The muscles and tissues around your throat relax and block air from passing through  Obesity, use of alcohol or cigarettes, or a family history are common causes  MONICA may increase your risk for complications after surgery  · Central sleep apnea (CSA)  means your brain does not send signals to the muscles that control breathing  You do not take a breath even though your airway is open  Common causes include medical conditions such as heart failure, being older than 40, or use of opioids  · Complex (or mixed) sleep apnea  means you have both obstructive and central sleep apnea  Common signs and symptoms:   · Loud snoring or long pauses in breathing    · Feeling sleepy, slow, and tired during the day    · Snorting, gasping, or choking while you sleep, and waking up suddenly because of these    · Feeling irritable during the day    · Dry mouth or a headache in the mornings    · Heavy night sweating    · A hard time thinking, remembering things, or focusing on your tasks the following day    Call your local emergency number (911 in the [address] [facility]) if:   · You have chest pain or trouble breathing  Call your doctor if:   · You have new or worsening signs or symptoms  · You have questions or concerns about your condition or care      Treatment  depends on the kind of apnea you have  · A mouth device  may be needed if you have mild sleep apnea  These are designed to keep your throat open  Ask your dentist or healthcare provider about the best mouth device for you  · A machine  may be used to help you get more air during sleep  A mask may be placed over your nose and mouth, or just your nose  The mask is hooked to the machine  You will get air through the mask  ? A continuous positive airway pressure (CPAP) machine  is used to keep your airway open during sleep  The machine blows a gentle stream of air into the mask when you breathe  This helps keep your airway open so you can breathe more regularly  Extra oxygen may be given through the machine  ? A bilevel positive airway pressure (BiPAP) machine  gives air but lowers the pressure when you breathe out  ? An adaptive servo-ventilator (ASV)  is a machine that learns your usual breathing pattern  Then, it uses pressure to give you air and prevent stops in your breathing  · Surgery  to expand your airway or remove extra tissues may be needed  Surgery is usually only considered if other treatments do not work  Manage or prevent sleep apnea:   · Reach and maintain a healthy weight  Ask your healthcare provider what a healthy weight is for you  Ask him or her to help you create a safe weight loss plan if you are overweight  Even a small goal of a 10% weight loss can improve your symptoms  · Do not smoke  Nicotine and other chemicals in cigarettes and cigars can cause lung damage  Ask your healthcare provider for information if you currently smoke and need help to quit  E-cigarettes or smokeless tobacco still contain nicotine  Talk to your healthcare provider before you use these products  · Do not drink alcohol or take sedative medicine before you go to sleep  Alcohol and sedatives can relax the muscles and tissues around your throat  This can block the airflow to your lungs      · Sleep on your side or use pillows designed to prevent sleep apnea  This prevents your tongue or other tissues from blocking your throat  You can also raise the head of your bed  Follow up with your doctor or specialist as directed: You may need to have blood tests during your follow-up visits  Work with your provider to find the right breathing support equipment and settings for you  Write down your questions so you remember to ask them during your visits  © Copyright [employer] 2022 Information is for End User's use only and may not be sold, redistributed or otherwise used for commercial purposes  All illustrations and images included in CareNotes® are the copyrighted property of A D A M [employer] Inc  or [employer]   The above information is an  only  It is not intended as medical advice for individual conditions or treatments  Talk to your doctor, nurse or pharmacist before following any medical regimen to see if it is safe and effective for you  Nursing Support:  When: Monday through Friday 7A-5PM except holidays  Where: Our direct line is [phone]  If you are having a true emergency please call 911  In the event that the line is busy or it is after hours please leave a voice message and we will return your call  Please speak clearly, leaving your full name, birth date, best number to reach you and the reason for your call  Medication refills: We will need the name of the medication, the dosage, the ordering provider, whether you get a 30 or 90 day refill, and the pharmacy name and address  Medications will be ordered by the provider only  Nurses cannot call in prescriptions  Please allow 7 days for medication refills  Physician requested updates:  If your provider requested that you call with an update after starting medication, please be ready to provide us the medication and dosage, what time you take your medication, the time you attempt to fall asleep, time you fall asleep, when you wake up, and what time you get out of bed  Sleep Study Results: We will contact you with sleep study results and/or next steps after the physician has reviewed your testing

## 2022-03-28 ENCOUNTER — TELEPHONE (OUTPATIENT)
Dept: SLEEP CENTER | Facility: CLINIC | Age: 42
End: 2022-03-28

## 2022-03-28 NOTE — TELEPHONE ENCOUNTER
----- Message from Odilon Moyer MD sent at 3/28/2022 12:32 PM EDT -----  Iron levels are a little lower than desired in restless legs, would like ferritin > 75 ng/ml  I'd recommend starting oral iron once a day- I like Vitron C but if too costly she can also use ferrous sulfate 325 mg and watch for constipation

## 2022-03-28 NOTE — TELEPHONE ENCOUNTER
Per [facility]-Request Reference Number: [id number]  EMGALITY INJ 120MG/ML is approved through 09/24/2022     Approval letter placed in clerical bin to be scanned    [facility] v[facility] made aware  He was able to get a paid claim  Someone from their pharmacy will contact this pt when the med is ready for

## 2022-03-28 NOTE — TELEPHONE ENCOUNTER
Called patient and reviewed lab results and Dr Dillon Omer recommendations  Verbalized understanding

## 2022-04-04 ENCOUNTER — OFFICE VISIT (OUTPATIENT)
Dept: URGENT CARE | Facility: CLINIC | Age: 42
End: 2022-04-04
Payer: COMMERCIAL

## 2022-04-04 ENCOUNTER — OFFICE VISIT (OUTPATIENT)
Dept: NEUROLOGY | Facility: CLINIC | Age: 42
End: 2022-04-04
Payer: COMMERCIAL

## 2022-04-04 VITALS
BODY MASS INDEX: 36.7 KG/M2 | SYSTOLIC BLOOD PRESSURE: 143 MMHG | WEIGHT: 215 LBS | RESPIRATION RATE: 20 BRPM | HEART RATE: 73 BPM | DIASTOLIC BLOOD PRESSURE: 83 MMHG | OXYGEN SATURATION: 99 % | HEIGHT: 64 IN | TEMPERATURE: 97.7 F

## 2022-04-04 DIAGNOSIS — M62.838 MUSCLE SPASM: Primary | ICD-10-CM

## 2022-04-04 DIAGNOSIS — G43.709 CHRONIC MIGRAINE WITHOUT AURA WITHOUT STATUS MIGRAINOSUS, NOT INTRACTABLE: Primary | ICD-10-CM

## 2022-04-04 DIAGNOSIS — M62.838 MUSCLE SPASM: ICD-10-CM

## 2022-04-04 PROBLEM — G43.011 INTRACTABLE MIGRAINE WITHOUT AURA AND WITH STATUS MIGRAINOSUS: Status: RESOLVED | Noted: 2018-10-25 | Resolved: 2022-04-04

## 2022-04-04 PROCEDURE — 99213 OFFICE O/P EST LOW 20 MIN: CPT | Performed by: PHYSICIAN ASSISTANT

## 2022-04-04 PROCEDURE — 96372 THER/PROPH/DIAG INJ SC/IM: CPT | Performed by: PSYCHIATRY & NEUROLOGY

## 2022-04-04 RX ORDER — CYCLOBENZAPRINE HCL 10 MG
TABLET ORAL
Qty: 15 TABLET | Refills: 0 | Status: SHIPPED | OUTPATIENT
Start: 2022-04-04 | End: 2022-06-15

## 2022-04-04 RX ORDER — KETOROLAC TROMETHAMINE 30 MG/ML
60 INJECTION, SOLUTION INTRAMUSCULAR; INTRAVENOUS ONCE
Status: COMPLETED | OUTPATIENT
Start: 2022-04-04 | End: 2022-04-04

## 2022-04-04 RX ADMIN — KETOROLAC TROMETHAMINE 60 MG: 30 INJECTION, SOLUTION INTRAMUSCULAR; INTRAVENOUS at 17:12

## 2022-04-04 NOTE — PROGRESS NOTES
[facility]        NAME: Luann Lanes is a 42 y o  female  : 1980    MRN: [id number]  DATE: 2022  TIME: 7:40 PM    Assessment and Plan   Muscle spasm [M62 838]  1  Muscle spasm  Ambulatory referral to Physical Therapy         Patient Instructions     Referred to PT, c/w Ibuprofen & tylenol, OTC icyhot, stretching, warm compresses  Recommend follow up with Neuro d/t persistent migraine headache  Pt currently taking zofran, ibuprofen, klonopin - discouraged taking muscle relaxant & toradol b/c of potential side effects d/t current NSAID & benzo use  Follow up with PCP in 3-5 days  Proceed to  ER if symptoms worsen  Chief Complaint     Chief Complaint   Patient presents with    Neck Pain     started last wednesday, left side of neck, taking obprofen for pain, not helping, limited ROM in neck, pt has chronic mirgaines on right side, due to pain having mirgraines on left side, zofran is not helping with nusesa from pain, no N/T at this time          History of Present Illness       Patient with past medical history significant for migraines presents with complaint of left-sided neck and head pain since last Wednesday  She denies any known injury but describes the pain as constant and aching  She denies fever, chills, sweats, vision changes, photophobia, vomiting  Patient states that this headache is different from her normal migraines due to being on the left side but denies contacting Neurology about this  She states that she has been taking Zofran for her nausea but denies any improvement  She denies paresthesias and weakness  Patient states that she has been taking ibuprofen without improvement and notes that she takes Klonopin at baseline  Pt describes pain w/ AROM of neck but denies limited motion  Review of Systems   Review of Systems   Constitutional: Negative for chills and fever  HENT: Negative for ear pain and sore throat      Eyes: Negative for pain and visual disturbance  Respiratory: Negative for cough and shortness of breath  Cardiovascular: Negative for chest pain and palpitations  Gastrointestinal: Negative for abdominal pain and vomiting  Genitourinary: Negative for dysuria and hematuria  Musculoskeletal: Positive for myalgias and neck pain  Negative for arthralgias and back pain  Skin: Negative for color change and rash  Neurological: Negative for seizures and syncope  All other systems reviewed and are negative  Current Medications       Current Outpatient Medications:     amphetamine-dextroamphetamine (ADDERALL) 15 MG tablet, Take 1 tablet by mouth 2 (two) times a day, Disp: , Rfl:     Cholecalciferol (VITAMIN D3) 2000 units TABS, Take by mouth, Disp: , Rfl:     clonazePAM (KlonoPIN) 0 5 mg tablet, Take 0 5 mg by mouth daily, Disp: , Rfl: 3    dexamethasone (DECADRON) 2 mg tablet, Decadron 2 mg 1 tab for 1-5  Days with breakfast for unrelenting migraine in moderation, Disp: 5 tablet, Rfl: 0    escitalopram (LEXAPRO) 20 mg tablet, Take 20 mg by mouth daily, Disp: , Rfl:     Galcanezumab-gnlm 120 MG/ML SOAJ, Inject 120 mg under the skin every 30 (thirty) days Following the first month loading dose of 2 pens  , Disp: 1 mL, Rfl: 11    ibuprofen (MOTRIN) 800 mg tablet, Take 1 tablet (800 mg total) by mouth every 6 (six) hours as needed for headaches, Disp: 15 tablet, Rfl: 0    Magnesium Oxide -Mg Supplement 400 MG CAPS, Take by mouth, Disp: , Rfl:     Melatonin 10 MG TABS, Take by mouth, Disp: , Rfl:     mirtazapine (REMERON) 7 5 MG tablet, Take 7 5 mg by mouth daily at bedtime, Disp: , Rfl:     norethindrone (Ortho Micronor) 0 35 MG tablet, Take 1 tablet (0 35 mg total) by mouth daily, Disp: 84 tablet, Rfl: 3    ondansetron (ZOFRAN-ODT) 8 mg disintegrating tablet, Take 1 tablet (8 mg total) by mouth every 8 (eight) hours as needed for nausea or vomiting, Disp: 20 tablet, Rfl: 0    Riboflavin (B2 PO), Take by mouth, Disp: , Rfl:    risperiDONE (RisperDAL) 1 mg tablet, Take 1 mg by mouth every evening, Disp: , Rfl:     Ubrelvy 100 MG tablet, TAKE 1 TABLET BY MOUTH ONCE  MAY REPEAT DOSE ONCE AFTER TWO HOURS IF NEEDED  MAX 2/24 HOURS, Disp: , Rfl:     cyclobenzaprine (FLEXERIL) 10 mg tablet, One tab nightly p r n  Muscle spasm, do not drive afterwards and be careful of sedation, Disp: 15 tablet, Rfl: 0  No current facility-administered medications for this visit  Current Allergies     Allergies as of 04/04/2022 - Reviewed 04/04/2022   Allergen Reaction Noted    Lyrica [pregabalin]  04/12/2017            The following portions of the patient's history were reviewed and updated as appropriate: allergies, current medications, past family history, past medical history, past social history, past surgical history and problem list      Past Medical History:   Diagnosis Date    Anxiety     BRCA1 negative 2018    Unsure which BRCA she was tested for    BRCA2 negative 2018    Unsure which BRCA she was tested for    Chickenpox     Depression     Migraines        Past Surgical History:   Procedure Laterality Date    BACK SURGERY      LOWER Description: LS-S1 disc surgery    ORTHOPEDIC SURGERY      WTE    WISDOM TOOTH EXTRACTION  08/1999       Family History   Problem Relation Age of Onset    Hypertension Father     Breast cancer Maternal Grandmother         unknown age   Edward Spinner Stroke Maternal Grandmother     Cancer Maternal Grandfather         with unknown primary sit     Breast cancer Paternal Grandmother         unknown age   Edward Spinner Other Son         Neuroblastoma of abdomen    Ovarian cancer Maternal Aunt         at age 28    Breast cancer Maternal Aunt 28    Breast cancer Paternal Aunt         dx at age 52    Sleep apnea Neg Hx          Medications have been verified          Objective   /83   Pulse 73   Temp 97 7 °F (36 5 °C) (Tympanic)   Resp 20   Ht 5' 4" (1 626 m)   Wt 97 5 kg (215 lb)   SpO2 99%   BMI 36 90 kg/m²   No LMP recorded  Physical Exam     Physical Exam  Vitals and nursing note reviewed  Constitutional:       General: She is not in acute distress  Appearance: Normal appearance  She is well-developed  She is not ill-appearing or diaphoretic  HENT:      Head: Normocephalic and atraumatic  Eyes:      Conjunctiva/sclera: Conjunctivae normal       Pupils: Pupils are equal, round, and reactive to light  Cardiovascular:      Rate and Rhythm: Normal rate and regular rhythm  Heart sounds: Normal heart sounds  Pulmonary:      Effort: Pulmonary effort is normal  No respiratory distress  Breath sounds: Normal breath sounds  No stridor  Musculoskeletal:         General: No swelling or tenderness  Normal range of motion  Cervical back: Normal range of motion and neck supple  No rigidity or tenderness  Comments: C-spine: no skin changes; NTTP; FAROM in all directions w/ discomfort; no bony or muscular tenderness  T-spine: no skin changes; mild muscle spasm palpated in left upper trap, NTTP; FAROM of UE, strength & sensation intact   Lymphadenopathy:      Cervical: No cervical adenopathy  Skin:     General: Skin is warm and dry  Capillary Refill: Capillary refill takes less than 2 seconds  Findings: No bruising, erythema or rash  Neurological:      Mental Status: She is alert and oriented to person, place, and time  Cranial Nerves: No cranial nerve deficit  Sensory: No sensory deficit  Psychiatric:         Behavior: Behavior normal          Thought Content:  Thought content normal

## 2022-04-04 NOTE — PROGRESS NOTES
She popped in today after visiting urgent care down stairs for neck tension/spasm and was tearful in our office due to them only prescribing PT and not treating her  When out to waiting room and discussed with patient her symptoms and plan  Symptoms are associated with some head pain and nausea and therefore will prescribe cyclobenzaprine 10 mg as needed in moderation as well as Toradol IM today  Agree with PT follow-up

## 2022-04-06 ENCOUNTER — TELEPHONE (OUTPATIENT)
Dept: NEUROLOGY | Facility: CLINIC | Age: 42
End: 2022-04-06

## 2022-04-06 DIAGNOSIS — G43.709 CHRONIC MIGRAINE WITHOUT AURA WITHOUT STATUS MIGRAINOSUS, NOT INTRACTABLE: ICD-10-CM

## 2022-04-06 DIAGNOSIS — M54.2 CERVICALGIA: Primary | ICD-10-CM

## 2022-04-06 RX ORDER — DEXAMETHASONE 2 MG/1
TABLET ORAL
Qty: 5 TABLET | Refills: 0 | Status: SHIPPED | OUTPATIENT
Start: 2022-04-06

## 2022-04-06 NOTE — TELEPHONE ENCOUNTER
As I discussed with her in person, neck pain technically would not be my area of expertise, but since she tolerated steroids in the past, that may help if she would like and I could enter referral to physiatry for neck pain as well if she would like

## 2022-04-06 NOTE — TELEPHONE ENCOUNTER
pt called and states that toradol helped on monday but now back in pain yesterday and today  this am took 800mg ibuoprofen and did not do anything   has PT appt next tuesday   she has been using cyclobenzaprine and has not felt any relief from this  currently 8/10, neck pain and shooting up to the top of her head/tempol/forehead area  no other symptoms currenlty       Please advise  [phone] to leave detailed message

## 2022-04-06 NOTE — TELEPHONE ENCOUNTER
Pt made aware of below  She is agreeable to steroids  Please enter order  She also wanted to make sure it was ok for her to take the steroids with the muscle relaxer      Also agreeable to physiatry referral  Please enter referral

## 2022-04-07 ENCOUNTER — EVALUATION (OUTPATIENT)
Dept: PHYSICAL THERAPY | Facility: CLINIC | Age: 42
End: 2022-04-07
Payer: COMMERCIAL

## 2022-04-07 DIAGNOSIS — M54.2 NECK PAIN: Primary | ICD-10-CM

## 2022-04-07 DIAGNOSIS — M62.838 MUSCLE SPASM: ICD-10-CM

## 2022-04-07 PROCEDURE — 97161 PT EVAL LOW COMPLEX 20 MIN: CPT

## 2022-04-07 NOTE — PROGRESS NOTES
PT Evaluation     Today's date: 2022  Patient name: Tom Foster  : 1980  MRN: [id number]  Referring provider: Praneeth Stephens  Dx:   Encounter Diagnosis     ICD-10-CM    1  Neck pain  M54 2    2  Muscle spasm  M62 838 Ambulatory referral to Physical Therapy                  Assessment  Assessment details: Pt is a 42 y o  female presenting to PT with acute neck pain with associated headaches  PT findings include: significant soft tissue tension of cervical paraspinals/suboccipitals, UT soft tissue tension, ROM deficits and likely periscapular and cervical strength deficits  Pt does present with poor posture, likely increasing cervical loads; also likely component of 1st rib dysfunction as relief noted with rib mobilizations Unable to test cervical strength secondary to irritability  Pt educated on findings, anatomy, biomechanics, POC and prognosis  Pt will benefit from skilled PT to address above impairments to return to PLOF with less restriction and to reach functional goals  Impairments: abnormal or restricted ROM, impaired physical strength, lacks appropriate home exercise program, pain with function and poor posture     Symptom irritability: moderateUnderstanding of Dx/Px/POC: good   Prognosis: fair    Goals  1  Pt will demonstrate understanding of HEP and POC in order to improve compliance with course of rehab in 2 weeks  2  Pt will demonstrate awareness of appropriate posture with seated, standing and functional dynamic reaching activities in order to decrease excessive loads/pain with ADL's in 3 weeks  3  Pt will improve cervical rotation to at least 70° B in order to allow pt to drive with less restriction by d/c    4  Pt pain will decrease to less than 2/10 in order for pt to participate in ADL's and work with less difficulty by d/c       Plan  Patient would benefit from: skilled physical therapy  Planned modality interventions: low level laser therapy  Planned therapy interventions: joint mobilization, manual therapy, neuromuscular re-education, patient education, postural training, strengthening, stretching, therapeutic activities, therapeutic exercise, home exercise program and functional ROM exercises  Frequency: 2x week  Duration in weeks: 8  Treatment plan discussed with: patient        Subjective Evaluation    History of Present Illness  Mechanism of injury: Pt visited urgent care  secondary to neck pain that has been ongoing since last Wednesday  Pt denies mechanism of injury  Pt reports she had been having headaches associated with neck pain but it has improved since then  Pt has significant history of chronic migraines which affects R side of head, however neck pain is affecting L side of head  Pt does note that Last , 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, slid onto base onto L shoulder/neck and injured herself  She participated in PT and with improvement  Aggravating factors involve moving head in all directions, mainly with extension  Pt denies any relief with ibuprofen  Alleviating Factors: Dexamethasone was started and felt significant relief with that  Ice helps with pain     Quality of life: good    Pain  Current pain ratin  At best pain ratin  At worst pain ratin  Location: L shoulder  Quality: dull ache, throbbing and tight  Relieving factors: medications and ice    Patient Goals  Patient goals for therapy: decreased pain, increased motion, increased strength and independence with ADLs/IADLs          Objective    Shoulder Screen:  WNL all directions    Flowsheet Rows      Most Recent Value   PT/OT G-Codes    Current Score 51   Projected Score 70      Cervical ROM:  Flex: 34°  Ext: 25°  SB: 30°/20°  Rotation 50°/40°    Joint mobility:  Cervical CPA:  Normal C2-C4 (noted guarding and pain however, therefore may not be accurate)  hypo C5-C7    Cervical UPA:   Trialed grossly painful: TBA    1st Rib mobility:  Hypo B - noted relief with L 1st rib mobilization    Thoracic mobility: TBA    Strength:  Rhomboid: TBA  Mid Trap: TBA  Lower Trap: TBA  Cervical retraction: pain noted  Deep Neck Flexor Endurance: deferred secondary to irritability    Palpation: +TTP L UT, L subocciptals     Special Tests:  Compression: negative  Distraction: negative  Spurling A: pain           Precautions: Depression, anxiety      Manuals 4/7            1st Rib mobilization             Thoracic CPA             Laser                          Neuro Re-Ed                                                                                                        Ther Ex             Row HEP Grn TB            Shoulder extension HEP Grn TB            UT stretch HEP            Chin tuck HEP             Self 1st rib mob                             UBE             Cervical Isometrics                          Ther Activity                                       Gait Training                                       Modalities

## 2022-05-05 NOTE — PROGRESS NOTES
Pt is approaching 30 days since last IE  Pt has consecutively cancelled all previous PT visits with no follow up  Pt to be discharged from skilled PT

## 2022-06-15 ENCOUNTER — OFFICE VISIT (OUTPATIENT)
Dept: NEUROLOGY | Facility: CLINIC | Age: 42
End: 2022-06-15
Payer: COMMERCIAL

## 2022-06-15 VITALS
HEART RATE: 82 BPM | HEIGHT: 64 IN | SYSTOLIC BLOOD PRESSURE: 146 MMHG | WEIGHT: 226 LBS | BODY MASS INDEX: 38.58 KG/M2 | TEMPERATURE: 98.5 F | DIASTOLIC BLOOD PRESSURE: 91 MMHG

## 2022-06-15 DIAGNOSIS — G43.709 CHRONIC MIGRAINE WITHOUT AURA WITHOUT STATUS MIGRAINOSUS, NOT INTRACTABLE: Primary | ICD-10-CM

## 2022-06-15 DIAGNOSIS — G43.019 INTRACTABLE MIGRAINE WITHOUT AURA AND WITHOUT STATUS MIGRAINOSUS: ICD-10-CM

## 2022-06-15 DIAGNOSIS — R11.0 NAUSEA: ICD-10-CM

## 2022-06-15 PROCEDURE — 96372 THER/PROPH/DIAG INJ SC/IM: CPT | Performed by: PSYCHIATRY & NEUROLOGY

## 2022-06-15 PROCEDURE — 99215 OFFICE O/P EST HI 40 MIN: CPT | Performed by: PSYCHIATRY & NEUROLOGY

## 2022-06-15 RX ORDER — RIZATRIPTAN BENZOATE 10 MG/1
10 TABLET ORAL ONCE AS NEEDED
Qty: 9 TABLET | Refills: 6 | Status: SHIPPED | OUTPATIENT
Start: 2022-06-15

## 2022-06-15 RX ORDER — FLUTICASONE PROPIONATE 50 MCG
SPRAY, SUSPENSION (ML) NASAL
COMMUNITY
Start: 2022-04-21

## 2022-06-15 RX ORDER — KETOROLAC TROMETHAMINE 30 MG/ML
60 INJECTION, SOLUTION INTRAMUSCULAR; INTRAVENOUS ONCE
Status: COMPLETED | OUTPATIENT
Start: 2022-06-15 | End: 2022-06-15

## 2022-06-15 RX ORDER — ONDANSETRON 4 MG/1
8 TABLET, ORALLY DISINTEGRATING ORAL EVERY 8 HOURS PRN
Qty: 20 TABLET | Refills: 4 | Status: SHIPPED | OUTPATIENT
Start: 2022-06-15

## 2022-06-15 RX ORDER — TOPIRAMATE 25 MG/1
TABLET ORAL
Qty: 120 TABLET | Refills: 4 | Status: SHIPPED | OUTPATIENT
Start: 2022-06-15

## 2022-06-15 RX ADMIN — KETOROLAC TROMETHAMINE 60 MG: 30 INJECTION, SOLUTION INTRAMUSCULAR; INTRAVENOUS at 11:02

## 2022-06-15 NOTE — PATIENT INSTRUCTIONS
Referral to sleep medicine for sleep study [phone]  - reasons - rule out sleep apnea, problems falling and staying asleep without meds, waking at night, chronic headaches     Headache/migraine treatment:   Abortive medications (for immediate treatment of a headache): It is ok to take ibuprofen, acetaminophen or naproxen (Advil, Tylenol,  Aleve, Excedrin) if they help your headaches you should limit these to No more than 3 times a week to avoid medication overuse/rebound headaches  For your more moderate to severe migraines take this medication early   Maxalt (rizatriptan) 10mg tabs - take one at the onset of headache  May repeat one time after 1-2 hours if pain has not resolved  (Max 2 a day and 9 a month)      Decadron 2 mg 1 tab for 1-5  days for unrelenting migraine in moderation    Prescription preventive medications for headaches/migraines   (to take every day to help prevent headaches - not to take at the time of headache):    Emgality/Galcanezumab - 120 mg injections every 30 days     READ INSTRUCTIONS that come with the medication  REFRIGERATE  Keep out of direct sunlight  Prior to administration, allow to come to room temperature for 30 minutes  Do not warm using a heat source (eg, microwave or hot water)  Do not shake  Administer in preferably abdomen (avoiding 2 inches around the navel), thigh, upper arm, or buttocks avoiding areas of skin that are tender, bruised, red or hard  Deliver entire contents of single-use prefilled pen or syringe   ------------        - Topiramate 25 mg nightly for 1 week, then increase to 25 mg in a m  And 25 mg in p m  For 1 week, then take 25 mg in a m  And 50 mg in p m  For 1 week, then take 50 mg in a m  and 50 mg in p m  And continue  - generally the common side effects improve as your body gets used to the medication    If we need to spread out a more gradual increase of the medication on a longer scale we can, just call if any questions or concerns  - if necessary, if the a m  dose is causing side effects we can always have you take the full dose at night instead    - important to know per data, this medication may, but typically does not affect birth control unless you are taking 200 mg daily or more and I highly recommend being on birth control while on this medication due to possible significant detrimental effects to fetus if you were to get pregnant     The medication you are taking may impact pregnancy  It has been associated with birth defects and learning problems if taken during pregnancy  Thus, it is important to avoid unplanned pregnancy while taking this medication  In the future, if you plan to become pregnant, then you should discuss this with your neurologist since medication adjustments may be indicated  *Typically these types of medications take time untill you see the benefit, although some may see improvement in days, often it may take weeks, especially if the medication is being titrated up to a beneficial level  Please contact us if there are any concerns or questions regarding the medication  Lifestyle Recommendations:  [x] SLEEP - Maintain a regular sleep schedule: Adults need at least 7-8 hours of uninterrupted a night  Maintain good sleep hygiene:  Going to bed and waking up at consistent times, avoiding excessive daytime naps, avoiding caffeinated beverages in the evening, avoid excessive stimulation in the evening and generally using bed primarily for sleeping  One hour before bedtime would recommend turning lights down lower, decreasing your activity (may read quietly, listen to music at a low volume)  When you get into bed, should eliminate all technology (no texting, emailing, playing with your phone, iPad or tablet in bed)  [x] HYDRATION - Maintain good hydration  Drink  2L of fluid a day (4 typical small water bottles)  [x] DIET - Maintain good nutrition   In particular don't skip meals and try and eat healthy balanced meals regularly  [x] TRIGGERS - Look for other triggers and avoid them: Limit caffeine to 1-2 cups a day or less  Avoid dietary triggers that you have noticed bring on your headaches (this could include aged cheese, peanuts, MSG, aspartame and nitrates)  Education and Follow-up  [x] Please call with any questions or concerns     [x] Follow up 3 months [place], with Dr Amaya Rodriguez in 6 months

## 2022-06-15 NOTE — PROGRESS NOTES
Review of Systems   Constitutional: Negative for appetite change and fever  HENT: Negative  Negative for hearing loss, tinnitus, trouble swallowing and voice change  Eyes: Negative  Negative for photophobia and pain  Respiratory: Negative  Negative for shortness of breath  Cardiovascular: Negative  Negative for palpitations  Gastrointestinal: Positive for nausea  Negative for vomiting  Endocrine: Negative  Negative for cold intolerance  Genitourinary: Negative  Negative for dysuria, frequency and urgency  Musculoskeletal: Negative  Negative for myalgias and neck pain  Skin: Negative  Negative for rash  Neurological: Positive for dizziness and headaches  Negative for tremors, seizures, syncope, facial asymmetry, speech difficulty, weakness, light-headedness and numbness  Day 4 migraine, has been taking decadron for 3 days now with little to no relief  Hematological: Negative  Does not bruise/bleed easily  Psychiatric/Behavioral: Negative  Negative for confusion, hallucinations and sleep disturbance  All other systems reviewed and are negative

## 2022-06-15 NOTE — PROGRESS NOTES
[facility] [facility] Consult - Follow up   PATIENT:  Sabine De La Cruz  MRN:  [id number]  :  1980  DATE OF SERVICE:  6/15/2022  REFERRED BY: No ref  provider found  PMD: Stevie Mueller MD    Assessment/Plan:   Roya Carr a 89 X  o  female with a past medical history of PTSD, anxiety, depression, vitamin-D deficiency, herniated lumbar disc, BMI over 35, referred here for evaluation of headache/migraine    Initial visit 2018     Chronic migraine without aura without status migrainosus   She reports throughout her life she really only had headaches about once a month until 3 years ago at age 27 she started developing daily headaches and migraines   Migraines typically a few days prior to menses  Typically her headaches and migraines are right frontal/temporal/retro-orbital and sometimes go down the neck with associated symptoms of nausea, phonophobia, Osmophobia, lightheadedness and problems with concentration   - as of 18: 6-9 migraine days a month, chronic daily headache  - as of 2018:  Topiramate has cut  chronic daily headaches in half  - As of 3/27/19: migraines 3-4 in a month, daily headaches are gone  - as of 2019:  At least 1 migraine a month, but lasting up to 1-2 weeks  - as of 10/14/2019: headaches 15/30 days, 1 week migraine a month  - as of 19:  Chronic headaches and migraines continue on topiramate to 100 mg b i d  and weaned down  Had 1 emgality shot without improvement will continue for 3 month trial   Her psychiatrist stopped all her psychiatry meds except for Klonopin (Abilify, lamotrigine, Lexapro, Ativan)  Canceled appointment with our headache specialist Dr Samra Jenkins despite my recommendation  She plans to look into medical marijuana  Steroids seemed to be the only thing that helps with migraines and felt Medrol Dosepak help more than dexamethasone  Trial of prochlorperazine which has not tried yet    - as of 3/18/2022: she has not been back in over 2 years  Was dealing with adjusting psychiatric meds  Over 25 headache or migraine days per month  referral to Sleep Medicine to rule out sleep apnea contributing  Trial of emgality for prevention for at least 3 month trial    - as of 6/15/2022: So far improved on emgality with about 2 migraines a week, and also some wearing off effect the last 5-7 days prior to next shot  She would like to add topiramate back since this helped in the past so will gradually wean up to 50 mg BID  Sleep study coming up  Resume rizatriptan for abortive since this helped the most, Ubrelvy did not help  Decadron for back help  Refilled zofran  toradol IM today        Workup:  -Genevie Pies and without contrast 11/24/2018: Several small white matter hyperintensities are seen on T2 and FLAIR imaging within the frontal lobes without mass effect, diffusion abnormality or abnormal enhancement   These are nonspecific in a patient of this age and white matter lesions have been described within the frontal lobes in patients with chronic migraine headaches       Preventative:  - We discussed headache hygiene and lifestyle factors which may include improve her headaches including hydration, exercise, diet and weight loss     - on through other providers:  risperidone, mirtazapine/Remeron, escitalopram/Lexapro, clonazepam/Klonopin, adderall, iron  - continue  emgality  Discussed proper use, possible side effects and risks  - will readd trial of topiramate with gradually increase to 50 mg BID  Discussed proper use, possible side effects and risks  - past:  topiramate up to 100 mg b i d   Did not help significantly,  Depakote  made it worse, amitriptyline, gabapentin increased anxiety, Lexapro use prior for mood without effect on migraine, prazosin for PTSD, Vistaril, Lyrica caused SI, cymbalta helped mood but caused headaches, SI from lamotrigine, seroquel SE, Buspar, blood pressure is consistently low and would not tolerate BP med, Temazepam/Restoril, risperidone, mirtazapine/Remeron, escitalopram/Lexapro, clonazepam/Klonopin, emgality for short course - only two shots  - future options: alternative CGRP such as ajovy, nurtec, vyepti, qulipta, botox      Abortive:  - discussed not taking more than 3 days a week any abortive medication to avoid medication overuse/rebound  - rizatriptan 10 mg seemed to work the best, only takes if able to sleep after  Discussed proper use, possible side effects and risks  - refilled zofran for nausea  Discussed proper use, possible side effects and risks  - back up: Decadron 2 mg 1 tab for 1-5  days for unrelenting migraine in moderation  Discussed proper use, possible side effects and risks  - past:  Indomethacin caused anxiety attack, rizatriptan made her too sleepy, toradol IM and PO did not help, Sumatriptan has worked a few times however most the time this makes her too sleepy to continue with her day   Depakote for 5 nights reportedly made her migraine worse, rizatriptan, prochlorperazine does not recall, ubrelvy 100 mg did not help  -  past/helped: dexamethasone 2 mg for 3-5 days that helped get rid of a migraine, toradol IM helps, cyclobenzaprine 10 mg helped   - future options: could consider trial for 5 days of Depakote or dexamethasone for prolonged migraine, reyvow, nurtec      Patient instructions         Headache/migraine treatment:   Abortive medications (for immediate treatment of a headache): It is ok to take ibuprofen, acetaminophen or naproxen (Advil, Tylenol,  Aleve, Excedrin) if they help your headaches you should limit these to No more than 3 times a week to avoid medication overuse/rebound headaches      For your more moderate to severe migraines take this medication early   Maxalt (rizatriptan) 10mg tabs - take one at the onset of headache  May repeat one time after 1-2 hours if pain has not resolved     (Max 2 a day and 9 a month)      Decadron 2 mg 1 tab for 1-5 days for unrelenting migraine in moderation    Prescription preventive medications for headaches/migraines   (to take every day to help prevent headaches - not to take at the time of headache):    Emgality/Galcanezumab - 120 mg injections every 30 days     READ INSTRUCTIONS that come with the medication  REFRIGERATE  Keep out of direct sunlight  Prior to administration, allow to come to room temperature for 30 minutes  Do not warm using a heat source (eg, microwave or hot water)  Do not shake  Administer in preferably abdomen (avoiding 2 inches around the navel), thigh, upper arm, or buttocks avoiding areas of skin that are tender, bruised, red or hard  Deliver entire contents of single-use prefilled pen or syringe   ------------      - Topiramate 25 mg nightly for 1 week, then increase to 25 mg in a m  And 25 mg in p m  For 1 week, then take 25 mg in a m  And 50 mg in p m  For 1 week, then take 50 mg in a m  and 50 mg in p m  And continue  - generally the common side effects improve as your body gets used to the medication  If we need to spread out a more gradual increase of the medication on a longer scale we can, just call if any questions or concerns  - if necessary, if the a m  dose is causing side effects we can always have you take the full dose at night instead    - important to know per data, this medication may, but typically does not affect birth control unless you are taking 200 mg daily or more and I highly recommend being on birth control while on this medication due to possible significant detrimental effects to fetus if you were to get pregnant     The medication you are taking may impact pregnancy  It has been associated with birth defects and learning problems if taken during pregnancy  Thus, it is important to avoid unplanned pregnancy while taking this medication   In the future, if you plan to become pregnant, then you should discuss this with your neurologist since medication adjustments may be indicated  *Typically these types of medications take time untill you see the benefit, although some may see improvement in days, often it may take weeks, especially if the medication is being titrated up to a beneficial level  Please contact us if there are any concerns or questions regarding the medication         Lifestyle Recommendations:  [x] SLEEP - Maintain a regular sleep schedule: Adults need at least 7-8 hours of uninterrupted a night  Maintain good sleep hygiene:  Going to bed and waking up at consistent times, avoiding excessive daytime naps, avoiding caffeinated beverages in the evening, avoid excessive stimulation in the evening and generally using bed primarily for sleeping   One hour before bedtime would recommend turning lights down lower, decreasing your activity (may read quietly, listen to music at a low volume)  When you get into bed, should eliminate all technology (no texting, emailing, playing with your phone, iPad or tablet in bed)  [x] HYDRATION - Maintain good hydration   Drink  2L of fluid a day (4 typical small water bottles)  [x] DIET - Maintain good nutrition  In particular don't skip meals and try and eat healthy balanced meals regularly  [x] TRIGGERS - Look for other triggers and avoid them: Limit caffeine to 1-2 cups a day or less  Avoid dietary triggers that you have noticed bring on your headaches (this could include aged cheese, peanuts, MSG, aspartame and nitrates)      Education and Follow-up  [x] Please call with any questions or concerns  [x] Follow up as scheduled with Jg Carballo in case we decide to do botox in the future, with Dr Román Demarco in 6 months          CC: We had the pleasure of evaluating Catarino De La Fuente neurological consultation today   she is a   right handed female who presents today for evaluation of headaches       History of Present Illness:   Interval history as of 6/15/2022  - came in 4/4/22 after seeing urgent care for neck pain, frustrated they did not do anything but refer to PT, tearful, gave toradol, she called again 4/6/22 for neck tension, I referred her to physiatry, she saw PT 4/7/22 and did not follow up with physiatry, but tried to make apmt - neck pain is a lot better, copay for PT 70 bucks, doing exercises at home and muscle relaxor helped   - saw sleep medicine 3/24/22, test coming up the 6/20/22, iron tested for RLS    Headaches and migraines   -she feels 1/3 percent better with emgality   - last 5-7 days before next injection  - migraines about twice a week, and last 5-7 days     Preventative:  - trial of emgality - started 3/28, had 3 shots   - requesting going back on topiramate   - mg, B2  - on through other providers: mirtazapine, risperdal, adderall, lexapro, iron, clonazepam     Abortive:   - ubrelvy not helping   - rizatriptan helped the most  - asking for zofran refills   - cyclobenzaprine helped for neck pain, done    Interval history as of 3/18/2022  - denies any new or concerning neurologic symptoms since last visit  - Last seen over 2 years ago and at that time was instructed to follow-up in 2 months  - Patient was placed on birth control (norethindrone) after 12/30/2019 visit which helped to reduce headaches- felt it reduced headaches from 12 times a month to 6-8 times a month  - Patient not able to followup previously due to trying to control anxiety and depression first with psychiatrist   - Patient has no other types of headaches  - sleep 8-9 hours, problems asleep, mirtazapine helps her sleep through the night, before 2 times a night, never had a sleep study    Headaches and migraines   Since last visit, headaches were stable  Patient describes right temporal sided dull to sometimes throbbing pain which are 6-8 times a month    Patient states having regular headaches which are also right temporal sided but only dull and less intense which are nearly daily, which last for around 4 hours    Headache free days a 1 a week     Migraines can last 2-3 days    Preventative:   - Currently not on any preventive medications for headache  Through other providers:  - Patient on mirtazapine 7 5mg daily for anxiety and depression  - Risperdal  1mg daily for anxiety and depression  - Lexapro 20mg daily for anxiety and depression  - Clonopin 0 5mg daily for anxiety and depression    Abortive:   - Patient takes Zofran which helps with the nausea  -Patient takes ubrelvy 100mg at onset of migraine and was helping initially but now about an hour or two or relief; Patient also tried taking 2 hours after and this doesn't help   Also noted fatigue with the Janis Sena  Denies bothersome side effects     Interval history as of 12/30/19  - she cancelled follow up with Dr Inga Kaiser, because she wants to do medical MJ  - had 1 emgality shot without improvement  - mood has been up and down, overall worse anxiety, following with psychiatry who stopped all her meds  - victor m is a hard time for her, her son that she lost his Eric Fatima is Victor M menjivar  - started seeing a psychology again   - psych - trying to get off klonopin, ativan which she has been on for years   - stopped  Abilify, lamotrigine, duloxetine, Lexapro  - now only on klonopin, topiramate 100 mg BID and did not help   - dexamethasone 2 mg daily for 5 days worked a little, but medrol dose pack 11/23/19 helped more   - indomethacin is causing panic attacks, lower dose did not work     - rizatriptan does not seem to be working most of the time and knocks her out and migraine comes back the next day   - taking melatonin, magnesium    Interval history as of 10/14/2019  - 7/30/2019:  CMP and CBC unremarkable  Vitamin-D 45 5, B12 453, TSH 1 4     Mood/Psych  - over the summer was on lamictal for 2 months since last visit through psychiatry  Was ok on 25 mg and then increased to 50 mg - had suicidal thoughts - no longer had those  - seroquel started - unknown dose - woke up in the middle of night and felt very dizzy (seroquel, topiramate, lexapro)  - has discussed with psychiatrist      Headaches/migraines  - last visit increased topiramate 50 mg b i d  Gradually to 75 mg b i d  - feels good on it  - now only getting migraines right before or during period   - indomethacin was working and  last too times had a panic attck  - rizatriptan does not seem to be working most of the time and knocks her out and migraine comes back the next day      - taking lexapro   - taking the melatonin, rb, mg        Interval history as of 07/03/2019:  - 05/10/2019: Radha Fonseca called in reporting migraines since 05/07   Throbbing dull right temporal, phonophobia   Nonresponsive to naproxen, Triptan x2, ketorolac/Toradol - sent Depakote for 5 to evenings to pharmacy  -05/12/2019 patient called in reporting on day 3 of Depakote without improvement  - 05/13/2019 migraine not relieved by Depakote, possibly worse - therefore sent dexamethasone 2 mg p o  Daily with breakfast for 5 days helped in the past and in this case  (had been on antibiotics then)     -06/24/2019: Radha Fonseca called with migraine starting 6/17 - throbbing right temple/retro-orbital and go down neck  nausea-nonresponsive to rizatriptan, ketorolac/Toradol, requesting med for nausea   Prescribe prochlorperazine - which she did not take as she was concerned about it lowering her blood pressure, then prescribed ondansetron  - called 07/01/2019 reporting dexamethasone resolved migraine but came back soon after worsening chronic anxiety and depression     - in the past 3 months migraines worsened,   One major one in may lasting a week, better with steroids  6/17 to now started with period, mild improvement with rizatriptan a little, reports took 1 compazine and did not help much  - indomethacin has worked in the past     - continues topiramate 50 mg b i d   Cymbalta/duloxetine 40 mg daily, Ativan 1 mg daily, taking the melatonin, rb, mg  She is wondering if this stopped working     Interval history as of 03/27/2019  - 01/21/2019 refilled rizatriptan 10 mg, topiramate, Toradol 10 mg  - called in 03/07/2019 reporting migraine not responding to rizatriptan or Toradol - 2 mg dexamethasone worked  - has cut out diet soda which she used to take daily  - Daily headaches are gone  - rizatriptan does not seem to be working most of the time and knocks her out and migraine comes back the next day   - toradol does not help too much, but has been hesitant to take it   - migraines 3-4 times a month  - denies SE      Interval events as of 12/17/2018:   - has decreased daily OTC pain meds use, to once or twice in past 5 weeks   - topiramate has cut daily headaches in half  12/3/18: migraine treated with dexamethasone 1 mg daily for 3 days  - bad news yesterday, they get insurance through the state, topiramate will not be covered         Headaches started at what age? Started regularly at 29 years old  How often do the headaches occur?   - as of 11/2018 - Past 3 years daily headache and migraines (Before that once a month)  - As of 12/17/18: Chronic daily headaches - have been cut in half  6-9 migraine days a month - in past 5 weeks has had 8 (11/20, 26, 12/1,2,3,4,15,16)  - As of 3/27/19: migraines 3-4 in a month, daily headaches are gone  - as of 07/03/2019:  At least 1 migraine a month, but lasting up to 1-2 weeks  - as of 10/14/2019: headaches 15/30 days, migraine 1 week    What time of the day do the headaches start?   no particular time for migraines, headaches may wake up with them   How long do the headaches last?    3-4 days, as of 7/3/19 up to 1-2 weeks   Are you ever headache free?   No     Aura?  without aura    Has a prodrome where she can tell it is going to be a migraine by the intensity of which it comes on      Describe your usual headache pain quality?   dull, stabbing, throbbing and pressure - pounding the most  Where is your headache located?  Usually on the right frontal/temporal, when starting to go away goes to left frontal - these are every day headaches    Migraines - usually just on the right frontal/temporal, sometimes right occipital    - throbbing right temple/retro-orbital and go down neck      Does the pain Radiate?  no radiation of pain  What is the intensity of pain?  Normal 5, migraines 9  Associated symptoms:   [x] Nausea       [] Vomiting        [] Diarrhea         [] Insomnia           [] Stiff or sore neck          [x] Problems with concentration  [] Photophobia      [x]Phonophobia      [x] Osmophobia  [] Blurred vision   [x] Prefer quiet, dark room        [x] Light-headed or dizzy    [] Tinnitus      [] Red ear      [] Ptosis      [] Facial droop  [] Lacrimation  [] Nasal congestion/rhinorrhea   [] Flushing of face         [] Change in pupil size  [] Hands or feet tingle or feel numb/paresthesias       Number of days missed per month because of headaches:  Work days: has to [employer]  Social or Family activities:  frequently       Things that make the headache worse?  Movement - with migraine, coughing, sneezing, bending over,      Headache triggers:   stress, change in eating pattern, menses, weather changes   What time of the year do headaches occur more frequently?   do not seem to be related to any time of the year      Have you seen someone else for headaches or pain? No - pain doctor in the past for herniated disc in back - had cortisone injection and did not work, surgery helped   Have you had trigger point injection performed and how often? No  Have you had Botox injection performed and how often? No   Have you had epidural injections or transforaminal injections performed? No  Have you used CBD or THC for your headaches and how often?  No  Are you current pregnant or planning on getting pregnant?  No, husbands is fixed   Have you ever had any Brain imaging?  Yes, MRI Brain      What medications do you take or have you taken for your headaches? ABORTIVE:   - OTC pain meds did not help - does not use more than 3 days per week  - rizatriptan helps a little, but too tired    - Tried ibuprofen and tylenol but does not help with even regular headaches  - 10/24/2021 Patient has taken toradol injection from the urgent care center but that did not help  - Medical marijuana also tried but not effective    Decadron 2 mg for 3-5 days has helped migraine  Indomethacin 50 mg t i d  For 3 days decreased chronic daily headache     Past:  Toradol IM and PO  did not help  Sumatriptan - put her to sleep     PREVENTIVE:   -topiramate   Mg, b2   Vit D      Past meds:  - amitriptyline - does not remember clearly, went off when pregnant   - Gabapentin in past  - increased anxiety   - depakote for 5 days worsened migraine she thinks  - lexapro and buspar for mood - no side effects, stopped after a few years  - prazosin the past for PTSD/nightmares  - Vistaril 25 mg in past  - lyrica in past made her have SI   - seroquel  - Temazepam/restoril for sleep but no longer needing it for sleep  - Ativan 0 5 mg- stopped because not as long lasting as klonopin  - cymbalta 20mg daily in the past- patient had weight gain        Alternative therapies used in the past for headaches? no other headache interventions have been tried     Neck pain?: No     LIFESTYLE  Sleep - averages 8-9 hours     Is your sleep restful?  Yes  How often do you get up at night?  No  Do you wake up with headaches? Yes  Do you snore while asleep? No  Have you been told that you stop breathing during sleeping? No  Do you wake up tired in the morning? No  Do you take frequent naps during the day? No unless during the weekends or during the migraine     Physical activity: none, but starting to do more walks with the dog  Water: 100 ounzes        Mood: stable  History of Bad anxiety and depression  - following with psychiatrist      Takes:   Clonopin 0 5 mg once in the AM  lexapro 20 mg daily - started about 2 years ago  Adderall 15mg BID- for ADD- started yesterday     In the past:  - lexapro, buspar for the longest - helped depression  prazosin the past for PTSD/nightmares  Vistaril 25 mg in past  Gabapentin in past   lyrica in past made her have SI   wellbutrin made her anxiety worse     The following portions of the patient's history were reviewed and updated as appropriate: allergies, current medications, past family history, past medical history, past social history, past surgical history and problem list      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  Education: Associates degree   Instructional school assistant  Lives with  marlon, son Dilshad Keating, daughter [de-identified]   13year old boy and 15year old daughter   Lost other son to cancer 10 years ago - neuroblastoma    Past Medical History:     Past Medical History:   Diagnosis Date    Anxiety     BRCA1 negative 2018    Unsure which BRCA she was tested for    BRCA2 negative 2018    Unsure which BRCA she was tested for    Chickenpox     Depression     Migraines        Patient Active Problem List   Diagnosis    Anxiety and depression    Lumbar herniated disc    Vitamin D deficiency    Chronic daily headache    Chronic migraine without aura without status migrainosus, not intractable    Nausea    Suspected sleep apnea       Medications:      Current Outpatient Medications   Medication Sig Dispense Refill    amphetamine-dextroamphetamine (ADDERALL) 15 MG tablet Take 1 tablet by mouth 2 (two) times a day      Cholecalciferol (VITAMIN D3) 2000 units TABS Take by mouth      clonazePAM (KlonoPIN) 0 5 mg tablet Take 1 mg by mouth daily  3    dexamethasone (DECADRON) 2 mg tablet Decadron 2 mg 1 tab for 1-5  Days with breakfast for unrelenting migraine in moderation 5 tablet 0    escitalopram (LEXAPRO) 20 mg tablet Take 20 mg by mouth daily      fluticasone (FLONASE) 50 mcg/act nasal spray USE 1 TO 2 SPRAYS IN EACH NOSTRIL EVERY DAY AS NEEDED      Galcanezumab-gnlm 120 MG/ML SOAJ Inject 120 mg under the skin every 30 (thirty) days Following the first month loading dose of 2 pens  1 mL 11    ibuprofen (MOTRIN) 800 mg tablet Take 1 tablet (800 mg total) by mouth every 6 (six) hours as needed for headaches 15 tablet 0    Iron-Vitamin C  MG TABS       Magnesium Oxide -Mg Supplement 400 MG CAPS Take by mouth      mirtazapine (REMERON) 7 5 MG tablet Take 7 5 mg by mouth daily at bedtime      ondansetron (ZOFRAN-ODT) 4 mg disintegrating tablet Take 2 tablets (8 mg total) by mouth every 8 (eight) hours as needed for nausea or vomiting 20 tablet 4    Riboflavin (B2 PO) Take by mouth      risperiDONE (RisperDAL) 1 mg tablet Take 1 mg by mouth every evening      rizatriptan (MAXALT) 10 mg tablet Take 1 tablet (10 mg total) by mouth once as needed for migraine May repeat in 2 hours if needed  Max 2/24 hours, 9/month  9 tablet 6    topiramate (TOPAMAX) 25 mg tablet 1 tab PO QHS for 1 week, increase as tolerated to 1 tab BID for 1 week, then 1 tab QAM and 2 tabs QHS for 1 week and finish at 2 tabs BID  120 tablet 4    Melatonin 10 MG TABS Take by mouth (Patient not taking: Reported on 6/15/2022)      norethindrone (Ortho Micronor) 0 35 MG tablet Take 1 tablet (0 35 mg total) by mouth daily 84 tablet 3     No current facility-administered medications for this visit  Allergies:       Allergies   Allergen Reactions    Lyrica [Pregabalin]      Other reaction(s): Suicidal ideation       Family History:     Family History   Problem Relation Age of Onset    Hypertension Father     Breast cancer Maternal Grandmother         unknown age   Roni Odom Stroke Maternal Grandmother     Cancer Maternal Grandfather         with unknown primary sit     Breast cancer Paternal Grandmother         unknown age   Roni Odom Other Son         Neuroblastoma of abdomen    Ovarian cancer Maternal Aunt         at age 28    Breast cancer Maternal Aunt 28    Breast cancer Paternal Aunt dx at age 52    Sleep apnea Neg Hx        Social History:     Social History     Socioeconomic History    Marital status: /Civil Union     Spouse name: Not on file    Number of children: Not on file    Years of education: associates degree    Highest education level: Not on file   Occupational History    Occupation: Patent processor-   Tobacco Use    Smoking status: Never Smoker    Smokeless tobacco: Never Used   Vaping Use    Vaping Use: Never used   Substance and Sexual Activity    Alcohol use: No    Drug use: No    Sexual activity: Yes     Partners: Male     Birth control/protection: Male Sterilization   Other Topics Concern    Not on file   Social History Narrative    Lack of exercise     Hoahaoism affiliation - non [employer]    Hobbies: reading, bike rides, volunteering    Employed:      Social Determinants of Health     Financial Resource Strain: Not on file   Food Insecurity: Not on file   Transportation Needs: Not on file   Physical Activity: Not on file   Stress: Not on file   Social Connections: Not on file   Intimate Partner Violence: Not on file   Housing Stability: Not on file         Objective:       Physical Exam:                                                                 Vitals:            Constitutional:    /91 (BP Location: Right arm, Patient Position: Sitting, Cuff Size: Standard)   Pulse 82   Temp 98 5 °F (36 9 °C) (Tympanic)   Ht 5' 4" (1 626 m)   Wt 103 kg (226 lb)   BMI 38 79 kg/m²   BP Readings from Last 3 Encounters:   06/15/22 146/91   04/04/22 143/83   03/24/22 123/69     Pulse Readings from Last 3 Encounters:   06/15/22 82   04/04/22 73   03/24/22 84         Well developed, well nourished, well groomed  No dysmorphic features  HEENT:  Normocephalic atraumatic  See neuro exam   Chest:  Respirations appear regular and unlabored  Cardiovascular:  no observed significant swelling  Musculoskeletal:  (see below under neurologic exam for evaluation of motor function and gait)   Skin:  warm and dry, not diaphoretic  Psychiatric:  Normal behavior and appropriate affect        Neurological Examination:     Mental status/cognitive function:   Recent and remote memory intact  Attention span and concentration as well as fund of knowledge are appropriate for age  Normal language and spontaneous speech  Cranial Nerves:   VII-facial expression symmetric  Motor Exam: symmetric bulk throughout  no atrophy, fasciculations or abnormal movements noted  Coordination:  no apparent dysmetria, ataxia or tremor noted  Gait: steady casual gait          Pertinent lab results:   See EMR for recent labs    3/19/21 CBC unremarkable   Vit D 31  TSH normal  Vitamin B12 476  CMP unremarkable     07/30/2019:  CMP and CBC unremarkable  Vitamin-D 45 5, B12 453, TSH 1 4     10/2018 Vit D 18 3 - replacement started     11/13/2018:  CMP within normal limits  B12 393  TSH 0 954     Imaging:   NCT 10/29/18:  Personally reviewed and unremarkable agree with radiology read of normal      MRI Brain with and without contrast 11/24/2018  Several small white matter hyperintensities are seen on T2 and FLAIR imaging within the frontal lobes without mass effect, diffusion abnormality or abnormal enhancement   These are nonspecific in a patient of this age and white matter lesions have been described within the frontal lobes in patients with chronic migraine headaches       Review of Systems:   ROS obtained by medical assistant Personally reviewed and updated if indicated  I recommended PCP follow up for non neurologic problems  Review of Systems   Constitutional: Negative for appetite change and fever  HENT: Negative  Negative for hearing loss, tinnitus, trouble swallowing and voice change  Eyes: Negative  Negative for photophobia and pain  Respiratory: Negative  Negative for shortness of breath      Cardiovascular: Negative  Negative for palpitations  Gastrointestinal: Positive for nausea  Negative for vomiting  Endocrine: Negative  Negative for cold intolerance  Genitourinary: Negative  Negative for dysuria, frequency and urgency  Musculoskeletal: Negative  Negative for myalgias and neck pain  Skin: Negative  Negative for rash  Neurological: Positive for dizziness and headaches  Negative for tremors, seizures, syncope, facial asymmetry, speech difficulty, weakness, light-headedness and numbness  Day 4 migraine, has been taking decadron for 3 days now with little relief  Hematological: Negative  Does not bruise/bleed easily  Psychiatric/Behavioral: Negative  Negative for confusion, hallucinations and sleep disturbance  All other systems reviewed and are negative    I have spent 27 minutes with Patient  today in which greater than 50% of this time was spent in counseling/coordination of care  I also spent 15 minutes non face to face for this patient the same day         Author:  Angelique Garces MD 6/15/2022 11:25 AM

## 2022-06-20 ENCOUNTER — HOSPITAL ENCOUNTER (OUTPATIENT)
Dept: SLEEP CENTER | Facility: CLINIC | Age: 42
Discharge: HOME | End: 2022-06-20
Payer: COMMERCIAL

## 2022-06-20 DIAGNOSIS — R29.818 SUSPECTED SLEEP APNEA: ICD-10-CM

## 2022-06-20 DIAGNOSIS — G47.33 OSA (OBSTRUCTIVE SLEEP APNEA): ICD-10-CM

## 2022-06-20 PROCEDURE — G0399 HOME SLEEP TEST/TYPE 3 PORTA: HCPCS

## 2022-06-21 NOTE — PROGRESS NOTES
Home Sleep Study Documentation    HOME STUDY DEVICE: Noxturnal yes                                           Carmen G3 no      Pre-Sleep Home Study:    Set-up and instructions performed by: NANI Dumont    Technician performed demonstration for Patient: yes    Return demonstration performed by Patient: yes    Written instructions provided to Patient: yes    Patient signed consent form: yes        Post-Sleep Home Study:    Additional comments by Patient: none    Home Sleep Study Failed:no:    Failure reason: N/A    Reported or Detected: N/A    Scored by: YADIEL Lerner RPSGT

## 2022-06-26 PROCEDURE — 95806 SLEEP STUDY UNATT&RESP EFFT: CPT | Performed by: PSYCHIATRY & NEUROLOGY

## 2022-06-28 ENCOUNTER — TELEPHONE (OUTPATIENT)
Dept: SLEEP CENTER | Facility: CLINIC | Age: 42
End: 2022-06-28

## 2022-06-28 DIAGNOSIS — G47.33 OSA (OBSTRUCTIVE SLEEP APNEA): Primary | ICD-10-CM

## 2022-06-28 DIAGNOSIS — R29.818 SUSPECTED SLEEP APNEA: ICD-10-CM

## 2022-06-28 NOTE — TELEPHONE ENCOUNTER
Sleep study resulted and does not show MONICA  Per office note 3/24/22, if no MONICA detected on home study, would likely follow up with polysomnogram     Dr Uriel Chavira did you want to place order for in lab study? Canceled 7/6/22 DME set up  Has follow up with Roderick Christie 9/9/22

## 2022-07-01 NOTE — TELEPHONE ENCOUNTER
Left message that home study showed no MONICA  Dr Sofía Lauren ordered diagnostic sleep study (allready scheduled 7/6/2022-Rusty)  Advised to call nurse line with any questions

## 2022-07-06 ENCOUNTER — HOSPITAL ENCOUNTER (OUTPATIENT)
Dept: SLEEP CENTER | Facility: CLINIC | Age: 42
Discharge: HOME | End: 2022-07-06
Payer: COMMERCIAL

## 2022-07-06 DIAGNOSIS — G47.33 OSA (OBSTRUCTIVE SLEEP APNEA): ICD-10-CM

## 2022-07-06 DIAGNOSIS — R29.818 SUSPECTED SLEEP APNEA: ICD-10-CM

## 2022-07-06 PROCEDURE — 95810 POLYSOM 6/> YRS 4/> PARAM: CPT | Performed by: INTERNAL MEDICINE

## 2022-07-06 PROCEDURE — 95810 POLYSOM 6/> YRS 4/> PARAM: CPT

## 2022-07-11 ENCOUNTER — TELEPHONE (OUTPATIENT)
Dept: SLEEP CENTER | Facility: CLINIC | Age: 42
End: 2022-07-11

## 2022-07-11 NOTE — TELEPHONE ENCOUNTER
Sleep study shows no MONICA   Patient has follow up appointment with Dr Lidia Bey      Left message for the patient to call back for results and also left appointment details

## 2022-07-13 ENCOUNTER — OFFICE VISIT (OUTPATIENT)
Dept: SLEEP CENTER | Facility: CLINIC | Age: 42
End: 2022-07-13
Payer: COMMERCIAL

## 2022-07-13 ENCOUNTER — TELEPHONE (OUTPATIENT)
Dept: NEUROLOGY | Facility: CLINIC | Age: 42
End: 2022-07-13

## 2022-07-13 VITALS
HEIGHT: 64 IN | BODY MASS INDEX: 39.13 KG/M2 | SYSTOLIC BLOOD PRESSURE: 124 MMHG | WEIGHT: 229.2 LBS | DIASTOLIC BLOOD PRESSURE: 80 MMHG

## 2022-07-13 DIAGNOSIS — G43.011 INTRACTABLE MIGRAINE WITHOUT AURA AND WITH STATUS MIGRAINOSUS: Primary | ICD-10-CM

## 2022-07-13 PROCEDURE — 99213 OFFICE O/P EST LOW 20 MIN: CPT | Performed by: INTERNAL MEDICINE

## 2022-07-13 NOTE — TELEPHONE ENCOUNTER
Patient called in tears regarding migraine HAs  She did have sleep study done  Appt w/Dr Kevin Bowen today (notes available in Epic)  Sleep study negative  Assessment:  There was a suspicion that obstructive sleep apnea could be the cause of her chronic morning headaches  As it turns out, there was no evidence of obstructive sleep apnea on either home sleep apnea testing or on polysomnography  Started on topiramate 25mg 6/15/22 - currently taking 2 tabs BID  Emgality - pt did recognize benefit from med; no missed doses  Rizatriptan 10mg - effective but it doesn't completely get rid of migraine    LOV - 6/15/22  F/U - 9/2022 w/Shanae    Patient is very frustrated  Although she did not want to be diagnosed with sleep apnea, she was hoping it would provide an answer as to why she has daily headaches  Patient asking if she should be realizing full benefit from topiramate after being on it for one month? Would a dose increase be appropriate? Should she try Botox injections? [phone] to leave detailed msg  Dr Robert Nolan - please advise

## 2022-07-13 NOTE — PROGRESS NOTES
Progress Note - Sleep Center   Marisela Mabry SPE:[id number] MRN: [id number]      Reason for Visit:    42 y  o female with severe, chronic migraine headaches    Assessment:  There was a suspicion that obstructive sleep apnea could be the cause of her chronic morning headaches  As it turns out, there was no evidence of obstructive sleep apnea on either home sleep apnea testing or on polysomnography  Plan:  I have nothing to offer the patient  Follow up: If needed    History of Present Illness:  Chronic migraine headaches, which occur principally in the early morning when she awakens      Review of Systems      Genitourinary none   Cardiology none   Gastrointestinal none   Neurology frequent headaches and awaken with headache   Constitutional weight change   Integumentary none   Psychiatry anxiety and depression   Musculoskeletal none   Pulmonary none   ENT none   Endocrine none   Hematological none           I have reviewed and updated the review of systems as necessary     Historical Information    Past Medical History:   Diagnosis Date    Anxiety     BRCA1 negative 2018    Unsure which BRCA she was tested for    BRCA2 negative 2018    Unsure which BRCA she was tested for    Chickenpox     Depression     Migraines          Past Surgical History:   Procedure Laterality Date    BACK SURGERY      LOWER Description: LS-S1 disc surgery    ORTHOPEDIC SURGERY      WTE    WISDOM TOOTH EXTRACTION  08/1999         Social History     Socioeconomic History    Marital status: /Civil Union     Spouse name: Not on file    Number of children: Not on file    Years of education: associates degree    Highest education level: Not on file   Occupational History    Occupation: Patent processor-   Tobacco Use    Smoking status: Never Smoker    Smokeless tobacco: Never Used   Vaping Use    Vaping Use: Never used   Substance and Sexual Activity    Alcohol use: No    Drug use: No    Sexual activity: Yes     Partners: Male     Birth control/protection: Male Sterilization   Other Topics Concern    Not on file   Social History Narrative    Lack of exercise     Jainism affiliation - non [employer]    Hobbies: reading, bike rides, volunteering    Employed:       Social Determinants of Health     Financial Resource Strain: Not on file   Food Insecurity: Not on file   Transportation Needs: Not on file   Physical Activity: Not on file   Stress: Not on file   Social Connections: Not on file   Intimate Partner Violence: Not on file   Housing Stability: Not on file           History   Alcohol use: Not on file       History   Smoking Status    Not on file   Smokeless Tobacco    Not on file       Family History:   Family History   Problem Relation Age of Onset    Hypertension Father     Breast cancer Maternal Grandmother         unknown age   [facility] Stroke Maternal Grandmother     Cancer Maternal Grandfather         with unknown primary sit     Breast cancer Paternal Grandmother         unknown age   [facility] Other Son         Neuroblastoma of abdomen    Ovarian cancer Maternal Aunt         at age 28    Breast cancer Maternal Aunt 28    Breast cancer Paternal Aunt         dx at age 52    Sleep apnea Neg Hx        Medications/Allergies:      Current Outpatient Medications:     amphetamine-dextroamphetamine (ADDERALL) 15 MG tablet, Take 1 tablet by mouth 2 (two) times a day, Disp: , Rfl:     Cholecalciferol (VITAMIN D3) 2000 units TABS, Take by mouth, Disp: , Rfl:     clonazePAM (KlonoPIN) 0 5 mg tablet, Take 1 mg by mouth daily, Disp: , Rfl: 3    dexamethasone (DECADRON) 2 mg tablet, Decadron 2 mg 1 tab for 1-5  Days with breakfast for unrelenting migraine in moderation, Disp: 5 tablet, Rfl: 0    escitalopram (LEXAPRO) 20 mg tablet, Take 20 mg by mouth daily, Disp: , Rfl:     fluticasone (FLONASE) 50 mcg/act nasal spray, USE 1 TO 2 SPRAYS IN EACH NOSTRIL EVERY DAY AS NEEDED, Disp: , Rfl:     Galcanezumab-gnlm 120 MG/ML SOAJ, Inject 120 mg under the skin every 30 (thirty) days Following the first month loading dose of 2 pens  , Disp: 1 mL, Rfl: 11    ibuprofen (MOTRIN) 800 mg tablet, Take 1 tablet (800 mg total) by mouth every 6 (six) hours as needed for headaches, Disp: 15 tablet, Rfl: 0    Iron-Vitamin C  MG TABS, , Disp: , Rfl:     Magnesium Oxide -Mg Supplement 400 MG CAPS, Take by mouth, Disp: , Rfl:     mirtazapine (REMERON) 7 5 MG tablet, Take 7 5 mg by mouth daily at bedtime, Disp: , Rfl:     ondansetron (ZOFRAN-ODT) 4 mg disintegrating tablet, Take 2 tablets (8 mg total) by mouth every 8 (eight) hours as needed for nausea or vomiting, Disp: 20 tablet, Rfl: 4    Riboflavin (B2 PO), Take by mouth, Disp: , Rfl:     risperiDONE (RisperDAL) 1 mg tablet, Take 1 mg by mouth every evening, Disp: , Rfl:     rizatriptan (MAXALT) 10 mg tablet, Take 1 tablet (10 mg total) by mouth once as needed for migraine May repeat in 2 hours if needed  Max 2/24 hours, 9/month , Disp: 9 tablet, Rfl: 6    topiramate (TOPAMAX) 25 mg tablet, 1 tab PO QHS for 1 week, increase as tolerated to 1 tab BID for 1 week, then 1 tab QAM and 2 tabs QHS for 1 week and finish at 2 tabs BID , Disp: 120 tablet, Rfl: 4    Melatonin 10 MG TABS, Take by mouth (Patient not taking: Reported on 6/15/2022), Disp: , Rfl:     norethindrone (Ortho Micronor) 0 35 MG tablet, Take 1 tablet (0 35 mg total) by mouth daily, Disp: 84 tablet, Rfl: 3      Objective    Vital Signs:   Vitals:    07/13/22 1327   BP: 124/80   Weight: 104 kg (229 lb 3 2 oz)   Height: 5' 4" (1 626 m)     Rocky Sleepiness Scale: Total score: 2    Physical Exam:    General: Alert, appropriate, cooperative, overweight    Head: NC/AT    Skin: Warm, dry    Neuro: No motor abnormalities, cranial nerves appear intact    Psych: Normal affect            FERNANDO Alvarado    Board Certified Sleep Specialist

## 2022-07-14 ENCOUNTER — TELEPHONE (OUTPATIENT)
Dept: SLEEP CENTER | Facility: CLINIC | Age: 42
End: 2022-07-14

## 2022-07-14 NOTE — TELEPHONE ENCOUNTER
----- Message from Jo Ann Oshea MD sent at 7/13/2022  7:21 PM EDT -----  Test does not show MONICA  She saw Dr Enoc Salazar today for the results  If she wants, I can see her to followup, as I started her on an iron supplement for RLS  If she does not followup with me she should make sure her PCP checks iron levels periodically to make sure there is no sign of iron overload or alternatively she should stop the supplement

## 2022-07-14 NOTE — TELEPHONE ENCOUNTER
There is a lot we do not know or understand about chronic pain including fibromyalgia  Fibromyalgia certainly can impact headaches and both can be related to stress and anxiety  PTSD can impact sleep patterns as well and impact chronic pain/fibro and migraines  Yes I think further treatment of fibromyalgia and anxiety could be very helpful for her sleep and headaches

## 2022-07-14 NOTE — TELEPHONE ENCOUNTER
Called patient and discussed Dr José Delarosa message  Patient states she does not regularly see her PCP but will ask her neurologist to follow up with her iron levels since she sees neurology on a regular basis

## 2022-07-14 NOTE — TELEPHONE ENCOUNTER
Patient calling in  She states she has had 2 sleep studies done, one done at home and the other in a sleep center clinic  She states that the provider reviewed the sleep study with her and states that she did not get into the deep stages of sleep  The provider made her aware that her sleep patters are normal within patients who have fibromyalgia  Patient states she asked the provider if headaches are consistent with patients with fibromyalgia and the provider said no  However, patient states she started to google fibromyalgia and states that if left untreated can lead to depression and anxiety and patient states she has a lot of anxiety and is not sure if the sleep medicine doctor just avoided discussing the fibromyalgia just to not work up her anxiety more       Patient wanted to let Dr Glory Xiong know of this situation to see if she thinks that looking into fibromyalgia would be reasonable    Please advise

## 2022-07-15 ENCOUNTER — TELEPHONE (OUTPATIENT)
Dept: NEUROLOGY | Facility: CLINIC | Age: 42
End: 2022-07-15

## 2022-07-15 NOTE — TELEPHONE ENCOUNTER
Received VM from patient  States she received a letter from [address] [place] [address] tier level is being changed  She will now need new PA for [employer] prior to refill needed next week  Will work on [place]

## 2022-07-18 NOTE — TELEPHONE ENCOUNTER
Called [facility], spoke with Maria Del Rosario Dorantes, he confirmed a new PA is needed and provided the following info :   [phone]  ID - [id number]  Allen Silva - [id number]  [facility] [id number]    Completed PA and sent to plan (office note file was too large to attach to the PA)  Key [id number]    Awaiting determination

## 2022-07-19 ENCOUNTER — APPOINTMENT (OUTPATIENT)
Dept: LAB | Facility: CLINIC | Age: 42
End: 2022-07-19
Payer: COMMERCIAL

## 2022-07-19 DIAGNOSIS — I10 ESSENTIAL HYPERTENSION, MALIGNANT: ICD-10-CM

## 2022-07-19 DIAGNOSIS — M25.50 PAIN IN JOINT, MULTIPLE SITES: ICD-10-CM

## 2022-07-19 DIAGNOSIS — E53.8 BIOTIN-(PROPIONYL-COA-CARBOXYLASE) LIGASE DEFICIENCY: ICD-10-CM

## 2022-07-19 DIAGNOSIS — Z13.0 SCREENING FOR IRON DEFICIENCY ANEMIA: ICD-10-CM

## 2022-07-19 LAB
25(OH)D3 SERPL-MCNC: 42.6 NG/ML (ref 30–100)
ALBUMIN SERPL BCP-MCNC: 4.1 G/DL (ref 3.5–5)
ALP SERPL-CCNC: 54 U/L (ref 46–116)
ALT SERPL W P-5'-P-CCNC: 34 U/L (ref 12–78)
ANION GAP SERPL CALCULATED.3IONS-SCNC: 10 MMOL/L (ref 4–13)
AST SERPL W P-5'-P-CCNC: 15 U/L (ref 5–45)
BASOPHILS # BLD AUTO: 0.07 THOUSANDS/ΜL (ref 0–0.1)
BASOPHILS NFR BLD AUTO: 1 % (ref 0–1)
BILIRUB SERPL-MCNC: 0.36 MG/DL (ref 0.2–1)
BUN SERPL-MCNC: 13 MG/DL (ref 5–25)
CALCIUM SERPL-MCNC: 8.8 MG/DL (ref 8.3–10.1)
CHLORIDE SERPL-SCNC: 107 MMOL/L (ref 96–108)
CHOLEST SERPL-MCNC: 224 MG/DL
CO2 SERPL-SCNC: 22 MMOL/L (ref 21–32)
CREAT SERPL-MCNC: 0.97 MG/DL (ref 0.6–1.3)
EOSINOPHIL # BLD AUTO: 0.15 THOUSAND/ΜL (ref 0–0.61)
EOSINOPHIL NFR BLD AUTO: 3 % (ref 0–6)
ERYTHROCYTE [DISTWIDTH] IN BLOOD BY AUTOMATED COUNT: 12 % (ref 11.6–15.1)
ERYTHROCYTE [SEDIMENTATION RATE] IN BLOOD: 3 MM/HOUR (ref 0–19)
GFR SERPL CREATININE-BSD FRML MDRD: 72 ML/MIN/1.73SQ M
GLUCOSE P FAST SERPL-MCNC: 97 MG/DL (ref 65–99)
HCT VFR BLD AUTO: 43.4 % (ref 34.8–46.1)
HDLC SERPL-MCNC: 55 MG/DL
HGB BLD-MCNC: 14.2 G/DL (ref 11.5–15.4)
IMM GRANULOCYTES # BLD AUTO: 0.02 THOUSAND/UL (ref 0–0.2)
IMM GRANULOCYTES NFR BLD AUTO: 0 % (ref 0–2)
LDLC SERPL CALC-MCNC: 139 MG/DL (ref 0–100)
LYMPHOCYTES # BLD AUTO: 1.89 THOUSANDS/ΜL (ref 0.6–4.47)
LYMPHOCYTES NFR BLD AUTO: 33 % (ref 14–44)
MCH RBC QN AUTO: 28.7 PG (ref 26.8–34.3)
MCHC RBC AUTO-ENTMCNC: 32.7 G/DL (ref 31.4–37.4)
MCV RBC AUTO: 88 FL (ref 82–98)
MONOCYTES # BLD AUTO: 0.57 THOUSAND/ΜL (ref 0.17–1.22)
MONOCYTES NFR BLD AUTO: 10 % (ref 4–12)
NEUTROPHILS # BLD AUTO: 2.97 THOUSANDS/ΜL (ref 1.85–7.62)
NEUTS SEG NFR BLD AUTO: 53 % (ref 43–75)
NONHDLC SERPL-MCNC: 169 MG/DL
NRBC BLD AUTO-RTO: 0 /100 WBCS
PLATELET # BLD AUTO: 264 THOUSANDS/UL (ref 149–390)
PMV BLD AUTO: 10.9 FL (ref 8.9–12.7)
POTASSIUM SERPL-SCNC: 3.9 MMOL/L (ref 3.5–5.3)
PROT SERPL-MCNC: 7.3 G/DL (ref 6.4–8.4)
RBC # BLD AUTO: 4.95 MILLION/UL (ref 3.81–5.12)
SODIUM SERPL-SCNC: 139 MMOL/L (ref 135–147)
T4 FREE SERPL-MCNC: 0.79 NG/DL (ref 0.76–1.46)
TRIGL SERPL-MCNC: 150 MG/DL
TSH SERPL DL<=0.05 MIU/L-ACNC: 1.64 UIU/ML (ref 0.45–4.5)
VIT B12 SERPL-MCNC: 487 PG/ML (ref 100–900)
WBC # BLD AUTO: 5.67 THOUSAND/UL (ref 4.31–10.16)

## 2022-07-19 PROCEDURE — 84443 ASSAY THYROID STIM HORMONE: CPT

## 2022-07-19 PROCEDURE — 80061 LIPID PANEL: CPT

## 2022-07-19 PROCEDURE — 36415 COLL VENOUS BLD VENIPUNCTURE: CPT

## 2022-07-19 PROCEDURE — 85025 COMPLETE CBC W/AUTO DIFF WBC: CPT

## 2022-07-19 PROCEDURE — 84439 ASSAY OF FREE THYROXINE: CPT

## 2022-07-19 PROCEDURE — 86038 ANTINUCLEAR ANTIBODIES: CPT

## 2022-07-19 PROCEDURE — 82306 VITAMIN D 25 HYDROXY: CPT

## 2022-07-19 PROCEDURE — 86200 CCP ANTIBODY: CPT

## 2022-07-19 PROCEDURE — 86430 RHEUMATOID FACTOR TEST QUAL: CPT

## 2022-07-19 PROCEDURE — 82607 VITAMIN B-12: CPT

## 2022-07-19 PROCEDURE — 80053 COMPREHEN METABOLIC PANEL: CPT

## 2022-07-19 PROCEDURE — 85652 RBC SED RATE AUTOMATED: CPT

## 2022-07-20 LAB
RHEUMATOID FACT SER QL LA: NEGATIVE
RYE IGE QN: NEGATIVE

## 2022-07-21 NOTE — TELEPHONE ENCOUNTER
Pt left voicemail asking to f/u on PA  Per CMM:    Approvedon July 18  Request Reference Number: [id number]  EMGALITY INJ 120MG/ML is approved through 07/18/2023  Please refer to the fax or electronic case notice for further information  Pt made aware   Also left voicemail for [facility] making them aware of approval

## 2022-07-22 LAB — CCP AB SER IA-ACNC: 1.1

## 2022-08-16 ENCOUNTER — TELEPHONE (OUTPATIENT)
Dept: NEUROLOGY | Facility: CLINIC | Age: 42
End: 2022-08-16

## 2022-08-16 NOTE — TELEPHONE ENCOUNTER
pt made aware of below  she states that she does take it with food and still gets nausea  She would like to wean off  She does not want an alternative med at this time    She will discuss possible alternative med at next appt in sept

## 2022-08-16 NOTE — TELEPHONE ENCOUNTER
If she would prefer to stay on she could just take it with food as that is a common issue   If she would like to wean off I recommend taking off 25 g weekly

## 2022-08-16 NOTE — TELEPHONE ENCOUNTER
pt left  at 9:01am stating that she has been on topamax since june adn having nausea ever since starting and it is getting increasingly worse  she thinks that nausea is from topamax as when she gets up inthe morning she has really strong nausea and will drink ginger ale or take a zofran and then once she takes her second dose of topamax she gets nauseous again    she would like to wean off   ok to leave a detailed message  [phone]    Please advise

## 2022-09-26 ENCOUNTER — TELEMEDICINE (OUTPATIENT)
Dept: NEUROLOGY | Facility: CLINIC | Age: 42
End: 2022-09-26
Payer: COMMERCIAL

## 2022-09-26 ENCOUNTER — TELEPHONE (OUTPATIENT)
Dept: NEUROLOGY | Facility: CLINIC | Age: 42
End: 2022-09-26

## 2022-09-26 DIAGNOSIS — F41.9 ANXIETY AND DEPRESSION: ICD-10-CM

## 2022-09-26 DIAGNOSIS — R51.9 CHRONIC DAILY HEADACHE: ICD-10-CM

## 2022-09-26 DIAGNOSIS — G43.009 MIGRAINE WITHOUT AURA AND WITHOUT STATUS MIGRAINOSUS, NOT INTRACTABLE: ICD-10-CM

## 2022-09-26 DIAGNOSIS — F32.A ANXIETY AND DEPRESSION: ICD-10-CM

## 2022-09-26 DIAGNOSIS — G43.709 CHRONIC MIGRAINE WITHOUT AURA WITHOUT STATUS MIGRAINOSUS, NOT INTRACTABLE: Primary | ICD-10-CM

## 2022-09-26 PROCEDURE — 99215 OFFICE O/P EST HI 40 MIN: CPT | Performed by: PHYSICIAN ASSISTANT

## 2022-09-26 RX ORDER — KETOROLAC TROMETHAMINE 10 MG/1
10 TABLET, FILM COATED ORAL EVERY 6 HOURS PRN
Qty: 10 TABLET | Refills: 4 | Status: SHIPPED | OUTPATIENT
Start: 2022-09-26

## 2022-09-26 RX ORDER — CLONAZEPAM 1 MG/1
1 TABLET ORAL EVERY MORNING
COMMUNITY

## 2022-09-26 RX ORDER — RIMEGEPANT SULFATE 75 MG/75MG
75 TABLET, ORALLY DISINTEGRATING ORAL AS NEEDED
Qty: 16 TABLET | Refills: 6 | Status: SHIPPED | OUTPATIENT
Start: 2022-09-26 | End: 2022-09-29 | Stop reason: SDUPTHER

## 2022-09-26 RX ORDER — DEXTROAMPHETAMINE SACCHARATE, AMPHETAMINE ASPARTATE MONOHYDRATE, DEXTROAMPHETAMINE SULFATE AND AMPHETAMINE SULFATE 5; 5; 5; 5 MG/1; MG/1; MG/1; MG/1
20 CAPSULE, EXTENDED RELEASE ORAL EVERY MORNING
COMMUNITY

## 2022-09-26 RX ORDER — METOCLOPRAMIDE 10 MG/1
10 TABLET ORAL 4 TIMES DAILY
Qty: 20 TABLET | Refills: 6 | Status: SHIPPED | OUTPATIENT
Start: 2022-09-26

## 2022-09-26 RX ORDER — ESCITALOPRAM OXALATE 10 MG/1
10 TABLET ORAL
COMMUNITY

## 2022-09-26 NOTE — PROGRESS NOTES
Assessment/Plan:   Rollen Cassis a 14 G  o  female with a past medical history of PTSD, anxiety, depression, vitamin-D deficiency, herniated lumbar disc, BMI over 35, referred here for evaluation of headache/migraine    Initial visit 11/12/2018     Chronic migraine without aura without status migrainosus   She reports throughout her life she really only had headaches about once a month until 3 years ago at age 27 she started developing daily headaches and migraines   Migraines typically a few days prior to menses  Typically her headaches and migraines are right frontal/temporal/retro-orbital and sometimes go down the neck with associated symptoms of nausea, phonophobia, Osmophobia, lightheadedness and problems with concentration   - as of 11/12/18: 6-9 migraine days a month, chronic daily headache  - as of 12/17/2018:  Topiramate has cut  chronic daily headaches in half  - As of 3/27/19: migraines 3-4 in a month, daily headaches are gone  - as of 07/03/2019:  At least 1 migraine a month, but lasting up to 1-2 weeks  - as of 10/14/2019: headaches 15/30 days, 1 week migraine a month  - as of 12/30/19:  Chronic headaches and migraines continue on topiramate to 100 mg b i d  and weaned down  Had 1 emgality shot without improvement will continue for 3 month trial   Her psychiatrist stopped all her psychiatry meds except for Klonopin (Abilify, lamotrigine, Lexapro, Ativan)  Canceled appointment with our headache specialist Dr Jose F Love despite my recommendation  She plans to look into medical marijuana  Steroids seemed to be the only thing that helps with migraines and felt Medrol Dosepak help more than dexamethasone  Trial of prochlorperazine which has not tried yet  - as of 3/18/2022: she has not been back in over 2 years  Was dealing with adjusting psychiatric meds  Over 25 headache or migraine days per month  referral to Sleep Medicine to rule out sleep apnea contributing   Trial of emgality for prevention for at least 3 month trial    - as of 6/15/2022: So far improved on emgality with about 2 migraines a week, and also some wearing off effect the last 5-7 days prior to next shot  She would like to add topiramate back since this helped in the past so will gradually wean up to 50 mg BID  Sleep study coming up  Resume rizatriptan for abortive since this helped the most, Ubrelvy did not help  Decadron for back help  Refilled zofran  toradol IM today  - as of 9/26/2022:  ***      Workup:  -Masha Horton and without contrast 11/24/2018: Several small white matter hyperintensities are seen on T2 and FLAIR imaging within the frontal lobes without mass effect, diffusion abnormality or abnormal enhancement   These are nonspecific in a patient of this age and white matter lesions have been described within the frontal lobes in patients with chronic migraine headaches       Preventative:  - We discussed headache hygiene and lifestyle factors which may include improve her headaches including hydration, exercise, diet and weight loss     - on through other providers:  risperidone, mirtazapine/Remeron, escitalopram/Lexapro, clonazepam/Klonopin, adderall, iron  - continue  emgality  Discussed proper use, possible side effects and risks  - will readd trial of topiramate with gradually increase to 50 mg BID  Discussed proper use, possible side effects and risks  - past:  topiramate up to 100 mg b i d   Did not help significantly,  Depakote  made it worse, amitriptyline, gabapentin increased anxiety, Lexapro use prior for mood without effect on migraine, prazosin for PTSD, Vistaril, Lyrica caused SI, cymbalta helped mood but caused headaches, SI from lamotrigine, seroquel SE, Buspar, blood pressure is consistently low and would not tolerate BP med, Temazepam/Restoril, risperidone, mirtazapine/Remeron, escitalopram/Lexapro, clonazepam/Klonopin, emgality for short course - only two shots  - future options: alternative CGRP such as ajovy, nurtec, vyepti, qulipta, botox      Abortive:  - discussed not taking more than 3 days a week any abortive medication to avoid medication overuse/rebound  - rizatriptan 10 mg seemed to work the best, only takes if able to sleep after  Discussed proper use, possible side effects and risks  - refilled zofran for nausea  Discussed proper use, possible side effects and risks  - back up: Decadron 2 mg 1 tab for 1-5  days for unrelenting migraine in moderation  Discussed proper use, possible side effects and risks  - past:  Indomethacin caused anxiety attack, rizatriptan made her too sleepy, toradol IM and PO did not help, Sumatriptan has worked a few times however most the time this makes her too sleepy to continue with her day   Depakote for 5 nights reportedly made her migraine worse, rizatriptan, prochlorperazine does not recall, ubrelvy 100 mg did not help  -  past/helped: dexamethasone 2 mg for 3-5 days that helped get rid of a migraine, toradol IM helps, cyclobenzaprine 10 mg helped   - future options: could consider trial for 5 days of Depakote or dexamethasone for prolonged migraine, reyvow, nurtec        Patient instructions         Headache/migraine treatment:   Abortive medications (for immediate treatment of a headache): It is ok to take ibuprofen, acetaminophen or naproxen (Advil, Tylenol,  Aleve, Excedrin) if they help your headaches you should limit these to No more than 3 times a week to avoid medication overuse/rebound headaches       For your more moderate to severe migraines take this medication early   Maxalt (rizatriptan) 10mg tabs - take one at the onset of headache  May repeat one time after 1-2 hours if pain has not resolved     (Max 2 a day and 9 a month)      Decadron 2 mg 1 tab for 1-5  days for unrelenting migraine in moderation     Prescription preventive medications for headaches/migraines   (to take every day to help prevent headaches - not to take at the time of headache):     Emgality/Galcanezumab - 120 mg injections every 30 days     READ INSTRUCTIONS that come with the medication  REFRIGERATE  Keep out of direct sunlight  Prior to administration, allow to come to room temperature for 30 minutes  Do not warm using a heat source (eg, microwave or hot water)  Do not shake  Administer in preferably abdomen (avoiding 2 inches around the navel), thigh, upper arm, or buttocks avoiding areas of skin that are tender, bruised, red or hard  Deliver entire contents of single-use prefilled pen or syringe   ------------         - Topiramate 50 mg in a m  and 50 mg in p m   - generally the common side effects improve as your body gets used to the medication   If we need to spread out a more gradual increase of the medication on a longer scale we can, just call if any questions or concerns  - if necessary, if the a m  dose is causing side effects we can always have you take the full dose at night instead     - important to know per data, this medication may, but typically does not affect birth control unless you are taking 200 mg daily or more and I highly recommend being on birth control while on this medication due to possible significant detrimental effects to fetus if you were to get pregnant      The medication you are taking may impact pregnancy  It has been associated with birth defects and learning problems if taken during pregnancy  Thus, it is important to avoid unplanned pregnancy while taking this medication  In the future, if you plan to become pregnant, then you should discuss this with your neurologist since medication adjustments may be indicated         *Typically these types of medications take time untill you see the benefit, although some may see improvement in days, often it may take weeks, especially if the medication is being titrated up to a beneficial level   Please contact us if there are any concerns or questions regarding the medication          Lifestyle Recommendations:  [x]?  SLEEP - Maintain a regular sleep schedule: Adults need at least 7-8 hours of uninterrupted a night  Maintain good sleep hygiene:  Going to bed and waking up at consistent times, avoiding excessive daytime naps, avoiding caffeinated beverages in the evening, avoid excessive stimulation in the evening and generally using bed primarily for sleeping   One hour before bedtime would recommend turning lights down lower, decreasing your activity (may read quietly, listen to music at a low volume)  When you get into bed, should eliminate all technology (no texting, emailing, playing with your phone, iPad or tablet in bed)  [x]? HYDRATION - Maintain good hydration   Drink  2L of fluid a day (4 typical small water bottles)  [x]?  DIET - Maintain good nutrition  In particular don't skip meals and try and eat healthy balanced meals regularly  [x]?  TRIGGERS - Look for other triggers and avoid them: Limit caffeine to 1-2 cups a day or less  Avoid dietary triggers that you have noticed bring on your headaches (this could include aged cheese, peanuts, MSG, aspartame and nitrates)      Education and Follow-up  [x]? Please call with any questions or concerns  [x]? Follow up as scheduled with  Dr Augie Mayo as scheduled       CC: We had the pleasure of evaluating Finesse Irene neurological consultation today  she is a   right handed female who presents today for evaluation of headaches       History of Present Illness:   Interval history as of 9/26/2022:  Sleep studies were negative    Weaned off of Topamax as was causing nausea  ***    Interval history as of 6/15/2022  - came in 4/4/22 after seeing urgent care for neck pain, frustrated they did not do anything but refer to PT, tearful, gave toradol, she called again 4/6/22 for neck tension, I referred her to physiatry, she saw PT 4/7/22 and did not follow up with physiatry, but tried to make apmt - neck pain is a lot better, copay for PT 70 bucks, doing exercises at home and muscle relaxor helped   - saw sleep medicine 3/24/22, test coming up the 6/20/22, iron tested for RLS     Headaches and migraines   -she feels 1/3 percent better with emgality   - last 5-7 days before next injection  - migraines about twice a week, and last 5-7 days      Preventative:  - trial of emgality - started 3/28, had 3 shots   - requesting going back on topiramate   - mg, B2  - on through other providers: mirtazapine, risperdal, adderall, lexapro, iron, clonazepam      Abortive:   - ubrelvy not helping   - rizatriptan helped the most  - asking for zofran refills   - cyclobenzaprine helped for neck pain, done     Interval history as of 3/18/2022  - denies any new or concerning neurologic symptoms since last visit  - Last seen over 2 years ago and at that time was instructed to follow-up in 2 months  - Patient was placed on birth control (norethindrone) after 12/30/2019 visit which helped to reduce headaches- felt it reduced headaches from 12 times a month to 6-8 times a month  - Patient not able to followup previously due to trying to control anxiety and depression first with psychiatrist   - Patient has no other types of headaches  - sleep 8-9 hours, problems asleep, mirtazapine helps her sleep through the night, before 2 times a night, never had a sleep study     Headaches and migraines   Since last visit, headaches were stable  Patient describes right temporal sided dull to sometimes throbbing pain which are 6-8 times a month    Patient states having regular headaches which are also right temporal sided but only dull and less intense which are nearly daily, which last for around 4 hours     Headache free days a 1 a week      Migraines can last 2-3 days     Preventative:   - Currently not on any preventive medications for headache  Through other providers:  - Patient on mirtazapine 7 5mg daily for anxiety and depression  - Risperdal  1mg daily for anxiety and depression  - Lexapro 20mg daily for anxiety and depression  - Clonopin 0 5mg daily for anxiety and depression     Abortive:   - Patient takes Zofran which helps with the nausea  -Patient takes ubrelvy 100mg at onset of migraine and was helping initially but now about an hour or two or relief; Patient also tried taking 2 hours after and this doesn't help   Also noted fatigue with the Milton Rhymes  Denies bothersome side effects      Interval history as of 12/30/19  - she cancelled follow up with Dr Clarice Baird, because she wants to do medical [facility]  - had 1 emgality shot without improvement  - mood has been up and down, overall worse anxiety, following with psychiatry who stopped all her meds  - victor m is a hard time for her, her son that she lost his Luiz Dewitt is Victor M menjivar  - started seeing a psychology again   - psych - trying to get off klonopin, ativan which she has been on for years   - stopped  Abilify, lamotrigine, duloxetine, Lexapro  - now only on klonopin, topiramate 100 mg BID and did not help   - dexamethasone 2 mg daily for 5 days worked a little, but medrol dose pack 11/23/19 helped more   - indomethacin is causing panic attacks, lower dose did not work      - rizatriptan does not seem to be working most of the time and knocks her out and migraine comes back the next day   - taking melatonin, magnesium     Interval history as of 10/14/2019  - 7/30/2019:  CMP and CBC unremarkable  Vitamin-D 45 5, B12 453, TSH 1 4     Mood/Psych  - over the summer was on lamictal for 2 months since last visit through psychiatry  Was ok on 25 mg and then increased to 50 mg - had suicidal thoughts - no longer had those  - seroquel started - unknown dose - woke up in the middle of night and felt very dizzy (seroquel, topiramate, lexapro)  - has discussed with psychiatrist      Headaches/migraines  - last visit increased topiramate 50 mg b i d  Gradually to 75 mg b i d  - feels good on it  - now only getting migraines right before or during period   - indomethacin was working and Marie Abramer too times had a panic attck  - rizatriptan does not seem to be working most of the time and knocks her out and migraine comes back the next day      - taking lexapro   - taking the melatonin, rb, mg        Interval history as of 07/03/2019:  - 05/10/2019: Len Delgado called in reporting migraines since 05/07   Throbbing dull right temporal, phonophobia   Nonresponsive to naproxen, Triptan x2, ketorolac/Toradol - sent Depakote for 5 to evenings to pharmacy  -05/12/2019 patient called in reporting on day 3 of Depakote without improvement  - 05/13/2019 migraine not relieved by Depakote, possibly worse - therefore sent dexamethasone 2 mg p o  Daily with breakfast for 5 days helped in the past and in this case  (had been on antibiotics then)     -06/24/2019: Len Delgado called with migraine starting 6/17 - throbbing right temple/retro-orbital and go down neck  nausea-nonresponsive to rizatriptan, ketorolac/Toradol, requesting med for nausea   Prescribe prochlorperazine - which she did not take as she was concerned about it lowering her blood pressure, then prescribed ondansetron  - called 07/01/2019 reporting dexamethasone resolved migraine but came back soon after worsening chronic anxiety and depression     - in the past 3 months migraines worsened,   One major one in may lasting a week, better with steroids  6/17 to now started with period, mild improvement with rizatriptan a little, reports took 1 compazine and did not help much  - indomethacin has worked in the past     - continues topiramate 50 mg b i d   Cymbalta/duloxetine 40 mg daily, Ativan 1 mg daily, taking the melatonin, rb, mg  She is wondering if this stopped working     Interval history as of 03/27/2019  - 01/21/2019 refilled rizatriptan 10 mg, topiramate, Toradol 10 mg  - called in 03/07/2019 reporting migraine not responding to rizatriptan or Toradol - 2 mg dexamethasone worked  - has cut out diet soda which she used to take daily  - Daily headaches are gone  - rizatriptan does not seem to be working most of the time and knocks her out and migraine comes back the next day   - toradol does not help too much, but has been hesitant to take it   - migraines 3-4 times a month  - denies SE      Interval events as of 12/17/2018:   - has decreased daily OTC pain meds use, to once or twice in past 5 weeks   - topiramate has cut daily headaches in half  12/3/18: migraine treated with dexamethasone 1 mg daily for 3 days  - bad news yesterday, they get insurance through the state, topiramate will not be covered         Headaches started at what age? Started regularly at 29 years old  How often do the headaches occur?   - as of 11/2018 - Past 3 years daily headache and migraines (Before that once a month)  - As of 12/17/18: Chronic daily headaches - have been cut in half  6-9 migraine days a month - in past 5 weeks has had 8 (11/20, 26, 12/1,2,3,4,15,16)  - As of 3/27/19: migraines 3-4 in a month, daily headaches are gone  - as of 07/03/2019:  At least 1 migraine a month, but lasting up to 1-2 weeks  - as of 10/14/2019: headaches 15/30 days, migraine 1 week    What time of the day do the headaches start?   no particular time for migraines, headaches may wake up with them   How long do the headaches last?    3-4 days, as of 7/3/19 up to 1-2 weeks   Are you ever headache free?   No     Aura?  without aura    Has a prodrome where she can tell it is going to be a migraine by the intensity of which it comes on      Describe your usual headache pain quality?   dull, stabbing, throbbing and pressure - pounding the most  Where is your headache located?  Usually on the right frontal/temporal, when starting to go away goes to left frontal - these are every day headaches    Migraines - usually just on the right frontal/temporal, sometimes right occipital    - throbbing right temple/retro-orbital and go down neck      Does the pain Radiate?  no radiation of pain  What is the intensity of pain?  Normal 5, migraines 9  Associated symptoms:   [x]? Nausea       []? Vomiting        []? Diarrhea         []? Insomnia           []? Stiff or sore neck          [x]?  Problems with concentration  []? Photophobia      [x]? Phonophobia      [x]?  Osmophobia  []? Blurred vision   [x]?  Prefer quiet, dark room        [x]?  Light-headed or dizzy    []? Tinnitus      []? Red ear      []? Ptosis      []? Facial droop  []? Lacrimation  []? Nasal congestion/rhinorrhea   []? Flushing of face         []? Change in pupil size  []? Hands or feet tingle or feel numb/paresthesias       Number of days missed per month because of headaches:  Work days: has to [employer]  Social or Family activities:  frequently       Things that make the headache worse?  Movement - with migraine, coughing, sneezing, bending over,      Headache triggers:   stress, change in eating pattern, menses, weather changes   What time of the year do headaches occur more frequently?   do not seem to be related to any time of the year      Have you seen someone else for headaches or pain? No - pain doctor in the past for herniated disc in back - had cortisone injection and did not work, surgery helped   Have you had trigger point injection performed and how often? No  Have you had Botox injection performed and how often? No   Have you had epidural injections or transforaminal injections performed? No  Have you used CBD or THC for your headaches and how often?  No  Are you current pregnant or planning on getting pregnant?  No, husbands is fixed   Have you ever had any Brain imaging? Yes, MRI Brain      What medications do you take or have you taken for your headaches?    ABORTIVE:   - OTC pain meds did not help - does not use more than 3 days per week  - rizatriptan helps a little, but too tired    - Tried ibuprofen and tylenol but does not help with even regular headaches  - 10/24/2021 Patient has taken toradol injection from the urgent care center but that did not help  - Medical marijuana also tried but not effective     Decadron 2 mg for 3-5 days has helped migraine  Indomethacin 50 mg t i d  For 3 days decreased chronic daily headache     Past:  Toradol IM and PO  did not help  Sumatriptan - put her to sleep     PREVENTIVE:   -topiramate   Mg, b2   Vit D      Past meds:  - amitriptyline - does not remember clearly, went off when pregnant   - Gabapentin in past  - increased anxiety   - depakote for 5 days worsened migraine she thinks  - lexapro and buspar for mood - no side effects, stopped after a few years  - prazosin the past for PTSD/nightmares  - Vistaril 25 mg in past  - lyrica in past made her have SI   - seroquel  - Temazepam/restoril for sleep but no longer needing it for sleep  - Ativan 0 5 mg- stopped because not as long lasting as klonopin  - cymbalta 20mg daily in the past- patient had weight gain         Alternative therapies used in the past for headaches? no other headache interventions have been tried     Neck pain?: No     LIFESTYLE  Sleep - averages 8-9 hours     Is your sleep restful?  Yes  How often do you get up at night?  No  Do you wake up with headaches? Yes  Do you snore while asleep? No  Have you been told that you stop breathing during sleeping? No  Do you wake up tired in the morning? No  Do you take frequent naps during the day? No unless during the weekends or during the migraine     Physical activity: none, but starting to do more walks with the dog  Water: 100 ounzes        Mood: stable  History of Bad anxiety and depression  - following with psychiatrist      Takes:   Clonopin 0 5 mg once in the AM  lexapro 20 mg daily - started about 2 years ago  Adderall 15mg BID- for ADD- started yesterday     In the past:  - lexapro, buspar for the longest - helped depression  prazosin the past for PTSD/nightmares  Vistaril 25 mg in past  Gabapentin in past   lyrica in past made her have SI   wellbutrin made her anxiety worse     The following portions of the patient's history were reviewed and updated as appropriate: allergies, current medications, past family history, past medical history, past social history, past surgical history and problem list      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

## 2022-09-26 NOTE — PATIENT INSTRUCTIONS
Headache/migraine treatment:   Abortive medications (for immediate treatment of a headache): It is ok to take ibuprofen, acetaminophen or naproxen (Advil, Tylenol,  Aleve, Excedrin) if they help your headaches you should limit these to No more than 3 times a week to avoid medication overuse/rebound headaches  For your more moderate to severe migraines take this medication early   Nurtec 75 mg  Limit of 1 in 24hours    If this fails, may add Reglan 10mg every 8 hours, limit of 20 a month and/or Ketorolac 10 mg, limit of 10 a month  If you still are waking up with migraines on day 2, please call for a medicine to take at bedtime    Prescription preventive medications for headaches/migraines   (to take every day to help prevent headaches - not to take at the time of headache):    Emgality/Galcanezumab - 120 mg injections every 30 days     READ INSTRUCTIONS that come with the medication  REFRIGERATE  Keep out of direct sunlight  Prior to administration, allow to come to room temperature for 30 minutes  Do not warm using a heat source (eg, microwave or hot water)  Do not shake  Administer in preferably abdomen (avoiding 2 inches around the navel), thigh, upper arm, or buttocks avoiding areas of skin that are tender, bruised, red or hard  Deliver entire contents of single-use prefilled pen or syringe   ------------  We will apply for Botox 200 units to help with your migraine  We will call to schedule your first appointment when approved  If you don't hear anything about this in 2-3 weeks, please message or call me      The medication you are taking may impact pregnancy  It has been associated with birth defects and learning problems if taken during pregnancy  Thus, it is important to avoid unplanned pregnancy while taking this medication  In the future, if you plan to become pregnant, then you should discuss this with your neurologist since medication adjustments may be indicated        *Typically these types of medications take time untill you see the benefit, although some may see improvement in days, often it may take weeks, especially if the medication is being titrated up to a beneficial level  Please contact us if there are any concerns or questions regarding the medication  Lifestyle Recommendations:  [x] SLEEP - Maintain a regular sleep schedule: Adults need at least 7-8 hours of uninterrupted a night  Maintain good sleep hygiene:  Going to bed and waking up at consistent times, avoiding excessive daytime naps, avoiding caffeinated beverages in the evening, avoid excessive stimulation in the evening and generally using bed primarily for sleeping  One hour before bedtime would recommend turning lights down lower, decreasing your activity (may read quietly, listen to music at a low volume)  When you get into bed, should eliminate all technology (no texting, emailing, playing with your phone, iPad or tablet in bed)  [x] HYDRATION - Maintain good hydration  Drink  2L of fluid a day (4 typical small water bottles)  [x] DIET - Maintain good nutrition  In particular don't skip meals and try and eat healthy balanced meals regularly  [x] TRIGGERS - Look for other triggers and avoid them: Limit caffeine to 1-2 cups a day or less  Avoid dietary triggers that you have noticed bring on your headaches (this could include aged cheese, peanuts, MSG, aspartame and nitrates)  Education and Follow-up  [x] Please call with any questions or concerns     [x] Follow up 3 months with Dr Clara Nolen

## 2022-09-26 NOTE — TELEPHONE ENCOUNTER
Received fax from [facility] requires PA    Key [id number]    PA initiated on CMM    Awaiting determination

## 2022-09-26 NOTE — PROGRESS NOTES
Virtual Regular Visit    Verification of patient location:    Patient is located in the following state in which I hold an active license [place]      Assessment/Plan:    Problem List Items Addressed This Visit        Cardiovascular and Mediastinum    Chronic migraine without aura without status migrainosus, not intractable - Primary    Relevant Medications    escitalopram (LEXAPRO) 10 mg tablet    clonazePAM (KlonoPIN) 1 mg tablet    Rimegepant Sulfate (Nurtec) 75 MG TBDP    metoclopramide (Reglan) 10 mg tablet    ketorolac (TORADOL) 10 mg tablet       Other    Anxiety and depression    Relevant Medications    escitalopram (LEXAPRO) 10 mg tablet    amphetamine-dextroamphetamine (ADDERALL XR) 20 MG 24 hr capsule    Chronic daily headache    Relevant Medications    escitalopram (LEXAPRO) 10 mg tablet    clonazePAM (KlonoPIN) 1 mg tablet    Rimegepant Sulfate (Nurtec) 75 MG TBDP    ketorolac (TORADOL) 10 mg tablet      Other Visit Diagnoses     Migraine without aura and without status migrainosus, not intractable        Relevant Medications    escitalopram (LEXAPRO) 10 mg tablet    clonazePAM (KlonoPIN) 1 mg tablet    Rimegepant Sulfate (Nurtec) 75 MG TBDP    ketorolac (TORADOL) 10 mg tablet               Reason for visit is   Chief Complaint   Patient presents with    Migraine    Virtual Regular Visit        Encounter provider Jarrett Baugh PA-C    Provider located at [address] [place] [address]  [phone]      Recent Visits  No visits were found meeting these conditions    Showing recent visits within past 7 days and meeting all other requirements  Today's Visits  Date Type Provider Dept   09/26/22 Telephone Jarrett Baugh PA-C  [facility]   09/26/22 Telemedicine Jarred Akhtar 33 today's visits and meeting all other requirements  Future Appointments  No visits were found meeting these conditions  Showing future appointments within next 150 days and meeting all other requirements       The patient was identified by name and date of birth  Endy Robb was informed that this is a telemedicine visit and that the visit is being conducted through [address] and patient was informed this is a secure, HIPAA-complaint platform  She agrees to proceed     My office door was closed  No one else was in the room  She acknowledged consent and understanding of privacy and security of the video platform  The patient has agreed to participate and understands they can discontinue the visit at any time  Patient is aware this is a billable service  Assessment/Plan:   Alecia keita 8 G  o  female with a past medical history of PTSD, anxiety, depression, vitamin-D deficiency, herniated lumbar disc, BMI over 35, mild gastroparesis referred here for evaluation of headache/migraine    Initial visit 11/12/2018     Chronic migraine without aura without status migrainosus   She reports throughout her life she really only had headaches about once a month until 3 years ago at age 27 she started developing daily headaches and migraines   Migraines typically a few days prior to menses  Typically her headaches and migraines are right frontal/temporal/retro-orbital and sometimes go down the neck with associated symptoms of nausea, phonophobia, Osmophobia, lightheadedness and problems with concentration   - as of 11/12/18: 6-9 migraine days a month, chronic daily headache  - as of 12/17/2018:  Topiramate has cut  chronic daily headaches in half  - As of 3/27/19: migraines 3-4 in a month, daily headaches are gone  - as of 07/03/2019:  At least 1 migraine a month, but lasting up to 1-2 weeks  - as of 10/14/2019: headaches 15/30 days, 1 week migraine a month  - as of 12/30/19:  Chronic headaches and migraines continue on topiramate to 100 mg b i d  and weaned down  Had 1 emgality shot without improvement will continue for 3 month trial   Her psychiatrist stopped all her psychiatry meds except for Klonopin (Abilify, lamotrigine, Lexapro, Ativan)  Canceled appointment with our headache specialist Dr Derick Soria despite my recommendation  She plans to look into medical marijuana  Steroids seemed to be the only thing that helps with migraines and felt Medrol Dosepak help more than dexamethasone  Trial of prochlorperazine which has not tried yet  - as of 3/18/2022: she has not been back in over 2 years  Was dealing with adjusting psychiatric meds  Over 25 headache or migraine days per month  referral to Sleep Medicine to rule out sleep apnea contributing  Trial of emgality for prevention for at least 3 month trial    - as of 6/15/2022: So far improved on emgality with about 2 migraines a week, and also some wearing off effect the last 5-7 days prior to next shot  She would like to add topiramate back since this helped in the past so will gradually wean up to 50 mg BID  Sleep study coming up  Resume rizatriptan for abortive since this helped the most, Ubrelvy did not help  Decadron for back help  Refilled zofran  toradol IM today     - as of 9/26/2022:  Daily headache (pain averages 5/10), with migraines 1/3 of the month >15 days a month (pain averages 8/10)       Workup:  - MRI Brain with and without contrast 11/24/2018: Several small white matter hyperintensities are seen on T2 and FLAIR imaging within the frontal lobes without mass effect, diffusion abnormality or abnormal enhancement   These are nonspecific in a patient of this age and white matter lesions have been described within the frontal lobes in patients with chronic migraine headaches       Preventative:  - We discussed headache hygiene and lifestyle factors which may include improve her headaches including hydration, exercise, diet and weight loss     - on through other providers:  risperidone, mirtazapine/Remeron, escitalopram/Lexapro, clonazepam/Klonopin, adderall, iron  - continue  emgality  Discussed proper use, possible side effects and risks  - We will auth for 200 units of Botox for chronic migraine    - past:  topiramate up to 100 mg b i d  Did not help significantly,  Depakote  made it worse, amitriptyline, gabapentin increased anxiety, Lexapro use prior for mood without effect on migraine, prazosin for PTSD, Vistaril, Lyrica caused SI, cymbalta helped mood but caused headaches, SI from lamotrigine, seroquel SE, Buspar, blood pressure is consistently low and would not tolerate BP med, Temazepam/Restoril, risperidone, mirtazapine/Remeron, escitalopram/Lexapro, clonazepam/Klonopin, emgality for short course - only two shots  - future options: alternative CGRP such as ajovy, nurtec, vyepti, qulipta,      Abortive:  - discussed not taking more than 3 days a week any abortive medication to avoid medication overuse/rebound  - Nurtec 75 mg at onset of migraine  Limit of 1 in 24 hours  - May add ketorolac 10 mg and/or reglan 10 mg for migraine cocktail  If unable to break in 1 day will add bedtime medicine  - refilled zofran for nausea  Discussed proper use, possible side effects and risks  - back up: Decadron 2 mg 1 tab for 1-5  days for unrelenting migraine in moderation  Discussed proper use, possible side effects and risks    - past:  Indomethacin caused anxiety attack, rizatriptan made her too sleepy, toradol IM and PO did not help, Sumatriptan has worked a few times however most the time this makes her too sleepy to continue with her day   Depakote for 5 nights reportedly made her migraine worse, rizatriptan, prochlorperazine does not recall, ubrelvy 100 mg did not help  -  past/helped: dexamethasone 2 mg for 3-5 days that helped get rid of a migraine, toradol IM helps, cyclobenzaprine 10 mg helped   - future options: could consider trial for 5 days of Depakote or dexamethasone for prolonged migraine, reyvow, nurtec        Patient instructions    Headache/migraine treatment:   Abortive medications (for immediate treatment of a headache): It is ok to take ibuprofen, acetaminophen or naproxen (Advil, Tylenol,  Aleve, Excedrin) if they help your headaches you should limit these to No more than 3 times a week to avoid medication overuse/rebound headaches  For your more moderate to severe migraines take this medication early   Nurtec 75 mg  Limit of 1 in 24hours    If this fails, may add Reglan 10mg every 8 hours, limit of 20 a month and/or Ketorolac 10 mg, limit of 10 a month  If you still are waking up with migraines on day 2, please call for a medicine to take at bedtime    Prescription preventive medications for headaches/migraines   (to take every day to help prevent headaches - not to take at the time of headache):    Emgality/Galcanezumab - 120 mg injections every 30 days     READ INSTRUCTIONS that come with the medication  REFRIGERATE  Keep out of direct sunlight  Prior to administration, allow to come to room temperature for 30 minutes  Do not warm using a heat source (eg, microwave or hot water)  Do not shake  Administer in preferably abdomen (avoiding 2 inches around the navel), thigh, upper arm, or buttocks avoiding areas of skin that are tender, bruised, red or hard  Deliver entire contents of single-use prefilled pen or syringe   ------------  We will apply for Botox 200 units to help with your migraine  We will call to schedule your first appointment when approved  If you don't hear anything about this in 2-3 weeks, please message or call me      The medication you are taking may impact pregnancy  It has been associated with birth defects and learning problems if taken during pregnancy  Thus, it is important to avoid unplanned pregnancy while taking this medication  In the future, if you plan to become pregnant, then you should discuss this with your neurologist since medication adjustments may be indicated        *Typically these types of medications take time untill you see the benefit, although some may see improvement in days, often it may take weeks, especially if the medication is being titrated up to a beneficial level  Please contact us if there are any concerns or questions regarding the medication  Lifestyle Recommendations:  [x] SLEEP - Maintain a regular sleep schedule: Adults need at least 7-8 hours of uninterrupted a night  Maintain good sleep hygiene:  Going to bed and waking up at consistent times, avoiding excessive daytime naps, avoiding caffeinated beverages in the evening, avoid excessive stimulation in the evening and generally using bed primarily for sleeping  One hour before bedtime would recommend turning lights down lower, decreasing your activity (may read quietly, listen to music at a low volume)  When you get into bed, should eliminate all technology (no texting, emailing, playing with your phone, iPad or tablet in bed)  [x] HYDRATION - Maintain good hydration  Drink  2L of fluid a day (4 typical small water bottles)  [x] DIET - Maintain good nutrition  In particular don't skip meals and try and eat healthy balanced meals regularly  [x] TRIGGERS - Look for other triggers and avoid them: Limit caffeine to 1-2 cups a day or less  Avoid dietary triggers that you have noticed bring on your headaches (this could include aged cheese, peanuts, MSG, aspartame and nitrates)  Education and Follow-up  [x] Please call with any questions or concerns  [x] Follow up 3 months with Dr Clara Nolen    CC: We had the pleasure of evaluating Ariela Read neurological consultation today  she is a   right handed female who presents today for evaluation of headaches       History of Present Illness:   Interval history as of 9/26/2022:  Sleep studies were negative    Weaned off of Topamax as was causing nausea    Dx with mild gastroparesis, started with diet changes last week, and endoscopy upcoming  Migraines are >15 days a month as last 2-5 days  Daily headache  Weaning off/changing psychiatric medications as concern over cause of gastroparesis    Interval history as of 6/15/2022  - came in 4/4/22 after seeing urgent care for neck pain, frustrated they did not do anything but refer to PT, tearful, gave toradol, she called again 4/6/22 for neck tension, I referred her to physiatry, she saw PT 4/7/22 and did not follow up with physiatry, but tried to make apmt - neck pain is a lot better, copay for PT 70 bucks, doing exercises at home and muscle relaxor helped   - saw sleep medicine 3/24/22, test coming up the 6/20/22, iron tested for RLS     Headaches and migraines   -she feels 1/3 percent better with emgality   - last 5-7 days before next injection  - migraines about twice a week, and last 5-7 days      Preventative:  - trial of emgality - started 3/28, had 3 shots   - requesting going back on topiramate   - mg, B2  - on through other providers: mirtazapine, risperdal, adderall, lexapro, iron, clonazepam      Abortive:   - ubrelvy not helping   - rizatriptan helped the most  - asking for zofran refills   - cyclobenzaprine helped for neck pain, done     Interval history as of 3/18/2022  - denies any new or concerning neurologic symptoms since last visit  - Last seen over 2 years ago and at that time was instructed to follow-up in 2 months  - Patient was placed on birth control (norethindrone) after 12/30/2019 visit which helped to reduce headaches- felt it reduced headaches from 12 times a month to 6-8 times a month  - Patient not able to followup previously due to trying to control anxiety and depression first with psychiatrist   - Patient has no other types of headaches  - sleep 8-9 hours, problems asleep, mirtazapine helps her sleep through the night, before 2 times a night, never had a sleep study     Headaches and migraines   Since last visit, headaches were stable   Patient describes right temporal sided dull to sometimes throbbing pain which are 6-8 times a month  Patient states having regular headaches which are also right temporal sided but only dull and less intense which are nearly daily, which last for around 4 hours     Headache free days a 1 a week      Migraines can last 2-3 days     Preventative:   - Currently not on any preventive medications for headache  Through other providers:  - Patient on mirtazapine 7 5mg daily for anxiety and depression  - Risperdal  1mg daily for anxiety and depression  - Lexapro 20mg daily for anxiety and depression  - Clonopin 0 5mg daily for anxiety and depression     Abortive:   - Patient takes Zofran which helps with the nausea  -Patient takes ubrelvy 100mg at onset of migraine and was helping initially but now about an hour or two or relief; Patient also tried taking 2 hours after and this doesn't help   Also noted fatigue with the João Lutz  Denies bothersome side effects      Interval history as of 12/30/19  - she cancelled follow up with Dr Cristobal Tejeda, because she wants to do medical M[facility]  - had 1 emgality shot without improvement  - mood has been up and down, overall worse anxiety, following with psychiatry who stopped all her meds  - victor m is a hard time for her, her son that she lost his Idalmis Dooley is Victor M liebermane  - started seeing a psychology again   - psych - trying to get off klonopin, ativan which she has been on for years   - stopped  Abilify, lamotrigine, duloxetine, Lexapro  - now only on klonopin, topiramate 100 mg BID and did not help   - dexamethasone 2 mg daily for 5 days worked a little, but medrol dose pack 11/23/19 helped more   - indomethacin is causing panic attacks, lower dose did not work      - rizatriptan does not seem to be working most of the time and knocks her out and migraine comes back the next day   - taking melatonin, magnesium     Interval history as of 10/14/2019  - 7/30/2019:  CMP and CBC unremarkable  Vitamin-D 45 5, B12 453, TSH 1 4     Mood/Psych  - over the summer was on lamictal for 2 months since last visit through psychiatry  Was ok on 25 mg and then increased to 50 mg - had suicidal thoughts - no longer had those  - seroquel started - unknown dose - woke up in the middle of night and felt very dizzy (seroquel, topiramate, lexapro)  - has discussed with psychiatrist      Headaches/migraines  - last visit increased topiramate 50 mg b i d  Gradually to 75 mg b i d  - feels good on it  - now only getting migraines right before or during period   - indomethacin was working and  last too times had a panic attck  - rizatriptan does not seem to be working most of the time and knocks her out and migraine comes back the next day      - taking lexapro   - taking the melatonin, rb, mg        Interval history as of 07/03/2019:  - 05/10/2019: Len Delgado called in reporting migraines since 05/07   Throbbing dull right temporal, phonophobia   Nonresponsive to naproxen, Triptan x2, ketorolac/Toradol - sent Depakote for 5 to evenings to pharmacy  -05/12/2019 patient called in reporting on day 3 of Depakote without improvement  - 05/13/2019 migraine not relieved by Depakote, possibly worse - therefore sent dexamethasone 2 mg p o  Daily with breakfast for 5 days helped in the past and in this case  (had been on antibiotics then)     -06/24/2019: Len Delgado called with migraine starting 6/17 - throbbing right temple/retro-orbital and go down neck  nausea-nonresponsive to rizatriptan, ketorolac/Toradol, requesting med for nausea   Prescribe prochlorperazine - which she did not take as she was concerned about it lowering her blood pressure, then prescribed ondansetron  - called 07/01/2019 reporting dexamethasone resolved migraine but came back soon after worsening chronic anxiety and depression     - in the past 3 months migraines worsened,   One major one in may lasting a week, better with steroids   6/17 to now started with period, mild improvement with rizatriptan a little, reports took 1 compazine and did not help much  - indomethacin has worked in the past     - continues topiramate 50 mg b i d   Cymbalta/duloxetine 40 mg daily, Ativan 1 mg daily, taking the melatonin, rb, mg  She is wondering if this stopped working     Interval history as of 03/27/2019  - 01/21/2019 refilled rizatriptan 10 mg, topiramate, Toradol 10 mg  - called in 03/07/2019 reporting migraine not responding to rizatriptan or Toradol - 2 mg dexamethasone worked  - has cut out diet soda which she used to take daily  - Daily headaches are gone  - rizatriptan does not seem to be working most of the time and knocks her out and migraine comes back the next day   - toradol does not help too much, but has been hesitant to take it   - migraines 3-4 times a month  - denies SE      Interval events as of 12/17/2018:   - has decreased daily OTC pain meds use, to once or twice in past 5 weeks   - topiramate has cut daily headaches in half  12/3/18: migraine treated with dexamethasone 1 mg daily for 3 days  - bad news yesterday, they get insurance through the state, topiramate will not be covered         Headaches started at what age? Started regularly at 29 years old  How often do the headaches occur?   - as of 11/2018 - Past 3 years daily headache and migraines (Before that once a month)  - As of 12/17/18: Chronic daily headaches - have been cut in half  6-9 migraine days a month - in past 5 weeks has had 8 (11/20, 26, 12/1,2,3,4,15,16)  - As of 3/27/19: migraines 3-4 in a month, daily headaches are gone  - as of 07/03/2019:  At least 1 migraine a month, but lasting up to 1-2 weeks  - as of 10/14/2019: headaches 15/30 days, migraine 1 week   - as 9/26/2022:  Headache 30/30 days, with migraines 15+ days a month, > 15 in a month, lasting longer than 4 hours     What time of the day do the headaches start?   no particular time for migraines, headaches wakes up with them   How long do the headaches last?    headaches last rest of the day and migraines lasts 4+ days as of 9/26/2022; prior 3-4 days, as of 7/3/19 up to 1-2 weeks   Are you ever headache free?   No     Aura?  without aura    Has a prodrome where she can tell it is going to be a migraine by the intensity of which it comes on      Describe your usual headache pain quality?     Mild: pressure, achy  Migraines: pounding, stabbing, throbbing and pressure -    Where is your headache located?  Usually on the right frontal/temporal, when starting to go away goes to left frontal - these are every day headaches    Migraines - usually just on the right frontal/temporal, sometimes right occipital    - throbbing right temple/retro-orbital and go down neck      Does the pain Radiate?  no radiation of pain  What is the intensity of pain?  Normal 5, migraines 9  Associated symptoms:   [x]? Nausea       []? Vomiting        []? Diarrhea         []? Insomnia           []? Stiff or sore neck          [x]?  Problems with concentration  []? Photophobia      [x]? Phonophobia      [x]?  Osmophobia  []? Blurred vision   [x]?  Prefer quiet, dark room        [x]?  Light-headed or dizzy    []? Tinnitus      []? Red ear      []? Ptosis      []? Facial droop  []? Lacrimation  []? Nasal congestion/rhinorrhea   []? Flushing of face         []? Change in pupil size  []? Hands or feet tingle or feel numb/paresthesias       Number of days missed per month because of headaches:  Work days: did miss last year, has to [facility] or Family activities:  frequently       Things that make the headache worse?  Movement - with migraine, coughing, sneezing, bending over,      Headache triggers:   stress, change in eating pattern, menses, weather changes   What time of the year do headaches occur more frequently?   do not seem to be related to any time of the year      Have you seen someone else for headaches or pain? No - pain doctor in the past for herniated disc in back - had cortisone injection and did not work, surgery helped   Have you had trigger point injection performed and how often? No  Have you had Botox injection performed and how often? No   Have you had epidural injections or transforaminal injections performed? No  Have you used CBD or THC for your headaches and how often?  No  Are you current pregnant or planning on getting pregnant?  No, husbands is fixed   Have you ever had any Brain imaging? Yes, MRI Brain      What medications do you take or have you taken for your headaches? ABORTIVE:   - OTC pain meds did not help - does not use more than 3 days per week  - rizatriptan helps a little, but too tired    - Tried ibuprofen and tylenol but does not help with even regular headaches  - 10/24/2021 Patient has taken toradol injection from the urgent care center but that did not help  - Medical marijuana also tried but not effective     Decadron 2 mg for 3-5 days has helped migraine  Indomethacin 50 mg t i d  For 3 days decreased chronic daily headache     Past:  Toradol IM and PO  did not help  Sumatriptan - put her to sleep     PREVENTIVE:   -topiramate   Mg, b2   Vit D      Past meds:  - amitriptyline - does not remember clearly, went off when pregnant   - Gabapentin in past  - increased anxiety   - depakote for 5 days worsened migraine she thinks  - lexapro and buspar for mood - no side effects, stopped after a few years  - prazosin the past for PTSD/nightmares  - Vistaril 25 mg in past  - lyrica in past made her have SI   - seroquel  - Temazepam/restoril for sleep but no longer needing it for sleep  - Ativan 0 5 mg- stopped because not as long lasting as klonopin  - cymbalta 20mg daily in the past- patient had weight gain         Alternative therapies used in the past for headaches? no other headache interventions have been tried     Neck pain?: No     LIFESTYLE  Sleep - averages 8-9 hours     Is your sleep restful?  Yes  How often do you get up at night?  No  Do you wake up with headaches?  Yes  Do you snore while asleep? No  Have you been told that you stop breathing during sleeping? No  Do you wake up tired in the morning? No  Do you take frequent naps during the day? No unless during the weekends or during the migraine     Physical activity: none, but starting to do more walks with the dog  Water: 100 ounzes        Mood: stable  History of Bad anxiety and depression  - following with psychiatrist      Takes:   Clonopin 0 5 mg once in the AM  lexapro 20 mg daily - started about 2 years ago  Adderall 15mg BID- for ADD- started yesterday     In the past:  - lexapro, buspar for the longest - helped depression  prazosin the past for PTSD/nightmares  Vistaril 25 mg in past  Gabapentin in past   lyrica in past made her have SI   wellbutrin made her anxiety worse     The following portions of the patient's history were reviewed and updated as appropriate: allergies, current medications, past family history, past medical history, past social history, past surgical history and problem list      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  Education: Associates degree   Instructional school assistant  Lives with  marlon, son Dona Cast, daughter [de-identified]   13year old boy and 15year old daughter   Lost other son to cancer 10 years ago - neuroblastoma      Past Medical History:   Diagnosis Date    Anxiety     BRCA1 negative 2018    Unsure which BRCA she was tested for    BRCA2 negative 2018    Unsure which BRCA she was tested for    Chickenpox     Cluster headache     Depression     Gastroparesis     Headache, tension-type     Migraines        Past Surgical History:   Procedure Laterality Date    BACK SURGERY      LOWER Description: LS-S1 disc surgery    ORTHOPEDIC SURGERY      WTE    WISDOM TOOTH EXTRACTION  08/1999       Current Outpatient Medications   Medication Sig Dispense Refill    amphetamine-dextroamphetamine (ADDERALL XR) 20 MG 24 hr capsule Take 20 mg by mouth every morning      amphetamine-dextroamphetamine (ADDERALL) 15 MG tablet Take 15 mg by mouth daily at bedtime as needed      Cholecalciferol (VITAMIN D3) 2000 units TABS Take 2,000 Units by mouth in the morning      clonazePAM (KlonoPIN) 0 5 mg tablet Take 0 5 mg by mouth if needed  3    clonazePAM (KlonoPIN) 1 mg tablet Take 1 mg by mouth every morning      dexamethasone (DECADRON) 2 mg tablet Decadron 2 mg 1 tab for 1-5  Days with breakfast for unrelenting migraine in moderation (Patient taking differently: if needed Decadron 2 mg 1 tab for 1-5  Days with breakfast for unrelenting migraine in moderation) 5 tablet 0    escitalopram (LEXAPRO) 10 mg tablet Take 10 mg by mouth daily at bedtime      fluticasone (FLONASE) 50 mcg/act nasal spray USE 1 TO 2 SPRAYS IN EACH NOSTRIL EVERY DAY AS NEEDED      Galcanezumab-gnlm 120 MG/ML SOAJ Inject 120 mg under the skin every 30 (thirty) days Following the first month loading dose of 2 pens   1 mL 11    ibuprofen (MOTRIN) 800 mg tablet Take 1 tablet (800 mg total) by mouth every 6 (six) hours as needed for headaches 15 tablet 0    Iron-Vitamin C  MG TABS Take 1 tablet by mouth in the morning      ketorolac (TORADOL) 10 mg tablet Take 1 tablet (10 mg total) by mouth every 6 (six) hours as needed (migraine) 10 tablet 4    Magnesium Oxide -Mg Supplement 400 MG CAPS Take 400 mg by mouth Daily at 2am      Melatonin 10 MG TABS Take 10 mg by mouth if needed      metoclopramide (Reglan) 10 mg tablet Take 1 tablet (10 mg total) by mouth 4 (four) times a day 20 tablet 6    ondansetron (ZOFRAN-ODT) 4 mg disintegrating tablet Take 2 tablets (8 mg total) by mouth every 8 (eight) hours as needed for nausea or vomiting 20 tablet 4    Riboflavin (B2 PO) Take 1 tablet by mouth in the morning      Rimegepant Sulfate (Nurtec) 75 MG TBDP Take 75 mg by mouth as needed (migraine) Limit of 1 in 24 hours 16 tablet 6    rizatriptan (MAXALT) 10 mg tablet Take 1 tablet (10 mg total) by mouth once as needed for migraine May repeat in 2 hours if needed  Max 2/24 hours, 9/month  9 tablet 6     No current facility-administered medications for this visit  Allergies   Allergen Reactions    Lyrica [Pregabalin]      Other reaction(s): Suicidal ideation      I have reviewed the patient's medical, social and surgical history as well as medications in detail and updated the computerized patient record  Review of Systems   Constitutional: Negative  HENT: Negative  Eyes: Negative  Respiratory: Negative  Cardiovascular: Negative  Gastrointestinal: Negative  Endocrine: Negative  Genitourinary: Negative  Musculoskeletal: Negative  Skin: Negative  Allergic/Immunologic: Negative  Neurological: Positive for headaches  Hematological: Negative  Psychiatric/Behavioral: Negative  I personally reviewed and updated the ROS that was entered by the medical assistant      Video Exam    There were no vitals filed for this visit  Physical Exam   CONSTITUTIONAL: Well developed, well nourished, well groomed  No dysmorphic features  Eyes:  EOM normal      Neck:  Normal ROM, neck supple  HEENT:  Normocephalic atraumatic  Chest:  Respirations regular and unlabored  Psychiatric:  Normal behavior and appropriate affect      MENTAL STATUS  Orientation: Alert and oriented x 3  Fund of knowledge: Intact        CN II-XII normal    I spent 65 minutes in total time for this visit

## 2022-09-29 DIAGNOSIS — G43.009 MIGRAINE WITHOUT AURA AND WITHOUT STATUS MIGRAINOSUS, NOT INTRACTABLE: ICD-10-CM

## 2022-09-29 RX ORDER — RIMEGEPANT SULFATE 75 MG/75MG
75 TABLET, ORALLY DISINTEGRATING ORAL AS NEEDED
Qty: 16 TABLET | Refills: 6 | Status: SHIPPED | OUTPATIENT
Start: 2022-09-29

## 2022-09-29 NOTE — TELEPHONE ENCOUNTER
Left detailed message per communication consent regarding below  Gave her [employer] phone number       Denial letter faxed to [facility]

## 2022-09-29 NOTE — TELEPHONE ENCOUNTER
nurtec denied as they need documentation that pt has fewer than 15 headache days per month       Per office note-as of 9/26/2022:  Daily headache (pain averages 5/10), with migraines 1/3 of the month >15 days a month (pain averages 8/10)     Please advise

## 2022-10-05 NOTE — TELEPHONE ENCOUNTER
New-Start-Botox-Authorization     Submitted prior-authorization request to [facility] via fax for:     New-Start-Botox-200 units, , [id number]      Awaiting approval/denial response      Will follow up on the status of this

## 2022-10-10 ENCOUNTER — APPOINTMENT (OUTPATIENT)
Dept: RADIOLOGY | Facility: CLINIC | Age: 42
End: 2022-10-10
Payer: COMMERCIAL

## 2022-10-10 PROCEDURE — 74019 RADEX ABDOMEN 2 VIEWS: CPT

## 2022-10-13 NOTE — TELEPHONE ENCOUNTER
Called and spoke with the patient's daughter  I requested that she have the patient give us a call back to set up Botox appointments

## 2022-10-13 NOTE — TELEPHONE ENCOUNTER
Received the following authorization approval info via fax from [place]:    Approved:   Botox-200 units  Auth# [id number]  Valid: 10/10/2022 until 10/09/2023  4 visits    Please use [facility]

## 2022-10-13 NOTE — TELEPHONE ENCOUNTER
Patient called back and I scheduled her New Start Botox for 11/01/22 at 03:30pm in the  with Samy Mckenna

## 2022-10-14 NOTE — TELEPHONE ENCOUNTER
Received voicemail from [place] with [facility] requesting Rx for Botox     Phone # [phone]  Fax # [phone]

## 2022-10-19 ENCOUNTER — TELEPHONE (OUTPATIENT)
Dept: NEUROLOGY | Facility: CLINIC | Age: 42
End: 2022-10-19

## 2022-10-19 DIAGNOSIS — G43.709 CHRONIC MIGRAINE WITHOUT AURA WITHOUT STATUS MIGRAINOSUS, NOT INTRACTABLE: ICD-10-CM

## 2022-10-19 DIAGNOSIS — G43.009 MIGRAINE WITHOUT AURA AND WITHOUT STATUS MIGRAINOSUS, NOT INTRACTABLE: Primary | ICD-10-CM

## 2022-10-19 DIAGNOSIS — M54.2 CERVICALGIA: ICD-10-CM

## 2022-10-19 RX ORDER — DEXAMETHASONE 2 MG/1
TABLET ORAL
Qty: 5 TABLET | Refills: 0 | Status: SHIPPED | OUTPATIENT
Start: 2022-10-19

## 2022-10-19 NOTE — TELEPHONE ENCOUNTER
Please have patient take Reyvow 100 mg at bedtime on nights when her migraines won't break  1 tab in 24 hours  Do not drive within 8 hours of taking  Gets 8 pills a month  Have her go to [employer] to apply for a coupon card

## 2022-10-19 NOTE — TELEPHONE ENCOUNTER
I sent in Decadron 2mg for her to take  May use 1-5 days until migraine breaks    She can take this and then hopefully get the [facility] tomorrow

## 2022-10-19 NOTE — TELEPHONE ENCOUNTER
Received vm from pt  States that she is a  patient of Dr Vladislav Reeves, but I also had a tele conference with Jenn Carrillo and when she spoke with her last,  she ordered new medication for when she get the migraines  And she said, if I had a migraine for more than 2 days and they didn't, she was to get a call and that she would give her something to take at night to knock the migraine out  this is like day 3 of migraine  She tried medication,and even tired cocktail and nothing is working    Non of her rescue meds are helping  [phone]    Please advise

## 2022-10-19 NOTE — TELEPHONE ENCOUNTER
Received vm from pt-  Yes, hi, my name is [employer]  I just spoke to somebody about Luis A calling and a reyvow prescription for me at [facility] and I just talked to them  They do need a pre authorization for it  But I did go onto the website and I did get a, a savings card  So we'll be free for at least the 1st month  But unfortunately, they're out of stock until tomorrow and all the other giants don't have it  So I don't know if there was any other options or just have to hold tight and wait till tomorrow  My phone number is [phone]  I am at work  So if I don't answer, you are free to leave a detailed message again, again, that was for the reyvow  i need a pre authorization for that  All right, thank you very much bye      We will complete PA for reyvow but please advise it you want her to wait until tomorrow or prescribe something else

## 2022-10-21 ENCOUNTER — TELEPHONE (OUTPATIENT)
Dept: NEUROLOGY | Facility: CLINIC | Age: 42
End: 2022-10-21

## 2022-10-24 ENCOUNTER — TELEPHONE (OUTPATIENT)
Dept: NEUROLOGY | Facility: CLINIC | Age: 42
End: 2022-10-24

## 2022-10-24 NOTE — TELEPHONE ENCOUNTER
Called [facility] and gave verbal RX to Toyin (pharmacist) for:     Botox-200 units  Qty:1  Refill:3    Toyin did expedite this order to be processed STAT per my request     Will follow up on the status of this order

## 2022-10-25 NOTE — TELEPHONE ENCOUNTER
Per [id number]   REYVOW TAB 100MG is approved through 10/21/2024     Approval letter placed in clerical bin to be scanned

## 2022-10-27 NOTE — TELEPHONE ENCOUNTER
Called [facility] and scheduled delivery with Keila James for:    Botox-200 units  Qty:1  Delivery to [facility]  Scheduled for 10/28/2022  Via: FedEx    Please advise if medication doesn't arrive

## 2022-10-28 NOTE — TELEPHONE ENCOUNTER
Botox number of units: 200 units  Botox quantity: 1  Arrived at what location: [facility]  Botox at Correct Administering Location: Yes  [address] number: [id number]  Lot number: [id number]  Expiration Date: 04/01/2025  Appt notes indicate correct medication: Yes

## 2022-11-01 ENCOUNTER — PROCEDURE VISIT (OUTPATIENT)
Dept: NEUROLOGY | Facility: CLINIC | Age: 42
End: 2022-11-01

## 2022-11-01 VITALS — HEART RATE: 69 BPM | TEMPERATURE: 97.9 F | SYSTOLIC BLOOD PRESSURE: 111 MMHG | DIASTOLIC BLOOD PRESSURE: 77 MMHG

## 2022-11-01 DIAGNOSIS — G43.709 CHRONIC MIGRAINE WITHOUT AURA WITHOUT STATUS MIGRAINOSUS, NOT INTRACTABLE: Primary | ICD-10-CM

## 2022-11-01 NOTE — PROGRESS NOTES
Universal Protocol   Consent: Verbal consent obtained  Written consent obtained  Risks and benefits: risks, benefits and alternatives were discussed  Consent given by: patient  Time out: Immediately prior to procedure a "time out" was called to verify the correct patient, procedure, equipment, support staff and site/side marked as required  Patient understanding: patient states understanding of the procedure being performed  Patient consent: the patient's understanding of the procedure matches consent given  Procedure consent: procedure consent matches procedure scheduled  Relevant documents: relevant documents present and verified  Patient identity confirmed: verbally with patient        Chemodenervation     Date/Time 11/1/2022 3:55 PM     Performed by  Lisa Camejo PA-C     Authorized by Lisa Camejo PA-C        Pre-procedure details      Prepped With: Alcohol     Anesthesia  (see MAR for exact dosages):      Anesthesia method:  None   Procedure details     Position:  Upright   Botox     Botox Type:  Type A    Brand:  Botox    mL's of Botulinum Toxin:  155    Final Concentration per CC:  200 units    Needle Gauge:  30 G 2 5 inch   Procedures     Botox Procedures: chronic headache      Indications: migraines     Injection Location      Head / Face:  L superior cervical paraspinal, R superior cervical paraspinal, L , R , L frontalis, R frontalis, L medial occipitalis, R medial occipitalis, procerus, R temporalis, L temporalis, L superior trapezius and R superior trapezius    L  injection amount:  5 unit(s)    R  injection amount:  5 unit(s)    L lateral frontalis:  5 unit(s)    R lateral frontalis:  5 unit(s)    L medial frontalis:  5 unit(s)    R medial frontalis:  5 unit(s)    L temporalis injection amount:  20 unit(s)    R temporalis injection amount:  20 unit(s)    Procerus injection amount:  5 unit(s)    L medial occipitalis injection amount:  15 unit(s)    R medial occipitalis injection amount:  15 unit(s)    L superior cervical paraspinal injection amount:  10 unit(s)    R superior cervical paraspinal injection amount:  10 unit(s)    L superior trapezius injection amount:  15 unit(s)    R superior trapezius injection amount:  15 unit(s)   Total Units     Total units used:  155    Total units discarded:  45   Post-procedure details      Chemodenervation:  Chronic migraine    Facial Nerve Location[de-identified]  Bilateral facial nerve    Patient tolerance of procedure:   Tolerated well, no immediate complications

## 2022-11-22 ENCOUNTER — TELEPHONE (OUTPATIENT)
Dept: NEUROLOGY | Facility: CLINIC | Age: 42
End: 2022-11-22

## 2022-11-22 NOTE — TELEPHONE ENCOUNTER
Spoke to patient regarding canceling her appointment with dr Charlie Garcia on 12/16/22  Patient was at work and informed me she would call the office back by tomorrow to reschedule

## 2022-11-29 ENCOUNTER — TELEPHONE (OUTPATIENT)
Dept: NEUROLOGY | Facility: CLINIC | Age: 42
End: 2022-11-29

## 2022-11-29 NOTE — TELEPHONE ENCOUNTER
Called patient and left voice mail informing patient that unfortunately Dr Ras Grace will be out of the office on 12/16/22 and we will have to reschedule her appointment

## 2022-12-28 ENCOUNTER — HOSPITAL ENCOUNTER (OUTPATIENT)
Dept: MAMMOGRAPHY | Facility: IMAGING CENTER | Age: 42
Discharge: HOME | End: 2022-12-28

## 2022-12-28 VITALS — WEIGHT: 180 LBS | BODY MASS INDEX: 30.73 KG/M2 | HEIGHT: 64 IN

## 2022-12-28 DIAGNOSIS — Z12.31 ENCOUNTER FOR SCREENING MAMMOGRAM FOR MALIGNANT NEOPLASM OF BREAST: ICD-10-CM

## 2023-01-03 ENCOUNTER — TELEPHONE (OUTPATIENT)
Dept: OBGYN CLINIC | Facility: CLINIC | Age: 43
End: 2023-01-03

## 2023-01-03 NOTE — TELEPHONE ENCOUNTER
Pt would like a call to review her mammo results  Pt is at school and her reception is spotty so she gave permission to leave a message (not detailed) to call back if she doesn't answer  Please call pt to review

## 2023-01-03 NOTE — TELEPHONE ENCOUNTER
Spoke to pt and reviewed results and recommendations from their mammo results per  KSM   Order in chart

## 2023-01-03 NOTE — TELEPHONE ENCOUNTER
----- Message from Jules Bright MD sent at 1/3/2023 11:41 AM EST -----  Make sure patient aware of results and has orders for additional imaging

## 2023-01-04 ENCOUNTER — TELEPHONE (OUTPATIENT)
Dept: OBGYN CLINIC | Facility: CLINIC | Age: 43
End: 2023-01-04

## 2023-01-04 NOTE — TELEPHONE ENCOUNTER
Patient is asking if there is anything we can do on our end as far as getting a sooner appointment for the US/3D mammo - Central scheduling can't get her in until 1/25  Patient is having severe anxiety and loosing sleep over the thought of having to get further testing  She is suffering from ptsd being that her 9year old son passed away from cancer  She doesn't think she can wait 3 weeks to get it done       Please advise

## 2023-01-04 NOTE — TELEPHONE ENCOUNTER
Pt will try calling daily to get an appt       I called Amy, a breast care nurse, to see if she can get her in sooner   Await cb

## 2023-01-10 ENCOUNTER — HOSPITAL ENCOUNTER (OUTPATIENT)
Dept: MAMMOGRAPHY | Facility: CLINIC | Age: 43
Discharge: HOME | End: 2023-01-10

## 2023-01-10 ENCOUNTER — TELEPHONE (OUTPATIENT)
Dept: OBGYN CLINIC | Facility: CLINIC | Age: 43
End: 2023-01-10

## 2023-01-10 ENCOUNTER — HOSPITAL ENCOUNTER (OUTPATIENT)
Dept: ULTRASOUND IMAGING | Facility: CLINIC | Age: 43
Discharge: HOME | End: 2023-01-10

## 2023-01-10 DIAGNOSIS — R92.8 ABNORMAL SCREENING MAMMOGRAM: ICD-10-CM

## 2023-01-10 DIAGNOSIS — Z12.31 ENCOUNTER FOR SCREENING MAMMOGRAM FOR MALIGNANT NEOPLASM OF BREAST: Primary | ICD-10-CM

## 2023-01-10 NOTE — TELEPHONE ENCOUNTER
----- Message from Jackalyn Meigs, MD sent at 1/10/2023 12:20 PM EST -----  Make sure patient aware of results and has order for next mammogram

## 2023-01-18 ENCOUNTER — TELEPHONE (OUTPATIENT)
Dept: NEUROLOGY | Facility: CLINIC | Age: 43
End: 2023-01-18

## 2023-01-18 DIAGNOSIS — G43.709 CHRONIC MIGRAINE WITHOUT AURA WITHOUT STATUS MIGRAINOSUS, NOT INTRACTABLE: ICD-10-CM

## 2023-01-18 DIAGNOSIS — M54.2 CERVICALGIA: ICD-10-CM

## 2023-01-18 RX ORDER — DEXAMETHASONE 2 MG/1
TABLET ORAL
Qty: 5 TABLET | Refills: 0 | Status: SHIPPED | OUTPATIENT
Start: 2023-01-18

## 2023-01-18 NOTE — TELEPHONE ENCOUNTER
Yep sounds like decadron has worked in the past, refilled just now if you can let her know, toradol IM is an option as well if interested

## 2023-01-18 NOTE — TELEPHONE ENCOUNTER
Received -Hi, my name is Nate Terry,  I'm actually a patient of Dr Kobi Montalvo  I've had an ongoing daily headache that some times, most of the time turns into migraine since about the middle of December  And at first I just thought it was the stress of  Victor M  I lost a son to cancer and his birthday is victor m Yulisa  So that is  always a tough time, but these headaches, these daily headaches are just not giving up at all  And like I said, they're getting worse and they're turning in a migraines  And unfortunately like my rescue medications that I have aren't helping , I don't know if I just  need something to kick, kick it out of cycle  So anyways, I didn't know if Dr Jani Flower could call something in to the pharmacy for me  My YOB: 1980  Again dr Jani Flower, and like I said, ongoing daily headache that eventually turned on migraine  Since about the middle of December  So I just need I need to get some relief  So if someone could call me back again, Nate Terry and thank you much abhi   [phone]    Called pt, currently 8/10, nausea starting  No other symptoms  She tried rizatriptan, reyvow, nurtec, naproxen, tylenol, toradol  and nothing helps     Decadron effective in the past  Per note office depakote made migraine worse  Never tired olanzapine  Please advise  [phone] to leave detailed message

## 2023-01-18 NOTE — TELEPHONE ENCOUNTER
Pt left -Yes, hi, my name is Gus eRnae, Few hours ago I talked to a nurse about dr Carlos Rm calling me something in for a relentless migraine  It's been going on for almost a month now  I'm leaving work like within 20 minutes  So I have to run to the pharmacy anyway and wanted to know if she called anything in yet again, Renny Rios birthday is 1980  I talked to the nurse earlier with detailed message and she was Laura Padgett talk to Dr Carlos Rm  So before I went out to [employer], wanted to know if something was called in all right, [phone]  Thanks so much bye        Called pt and made her aware of below

## 2023-01-25 ENCOUNTER — TELEPHONE (OUTPATIENT)
Dept: NEUROLOGY | Facility: CLINIC | Age: 43
End: 2023-01-25

## 2023-01-25 NOTE — TELEPHONE ENCOUNTER
Called [facility] and spoke with Dane regarding the delivery status of patients Botox medication  Dane informed me that patients account is currently pending patients consent to have medication delivered  Dane also informed me that the patient has an outstanding co-pay balance of $1,300 00 that is owed prior to [facility] being able to deliver  Called patient and left VM message requesting they reach out to [facility] ASAP and provide them with any and all info needed in order to have Botox medication delivered to our office prior to their 2/01/2023 Botox appointment  Will follow up on the status of this order tomorrow 1/26/2023

## 2023-03-09 ENCOUNTER — ANNUAL EXAM (OUTPATIENT)
Dept: OBGYN CLINIC | Facility: CLINIC | Age: 43
End: 2023-03-09

## 2023-03-09 VITALS
HEIGHT: 65 IN | BODY MASS INDEX: 28.26 KG/M2 | DIASTOLIC BLOOD PRESSURE: 52 MMHG | WEIGHT: 169.6 LBS | SYSTOLIC BLOOD PRESSURE: 124 MMHG

## 2023-03-09 DIAGNOSIS — Z11.3 SCREENING FOR STD (SEXUALLY TRANSMITTED DISEASE): ICD-10-CM

## 2023-03-09 DIAGNOSIS — Z01.419 ENCOUNTER FOR ANNUAL ROUTINE GYNECOLOGICAL EXAMINATION: Primary | ICD-10-CM

## 2023-03-09 RX ORDER — ONDANSETRON 8 MG/1
TABLET, ORALLY DISINTEGRATING ORAL
COMMUNITY
Start: 2022-12-28

## 2023-03-09 RX ORDER — LINACLOTIDE 290 UG/1
1 CAPSULE, GELATIN COATED ORAL
COMMUNITY
Start: 2023-03-07

## 2023-03-09 RX ORDER — DEXTROAMPHETAMINE SACCHARATE, AMPHETAMINE ASPARTATE, DEXTROAMPHETAMINE SULFATE AND AMPHETAMINE SULFATE 2.5; 2.5; 2.5; 2.5 MG/1; MG/1; MG/1; MG/1
1-2 TABLET ORAL DAILY
COMMUNITY
Start: 2023-01-23

## 2023-03-09 RX ORDER — OMEPRAZOLE 20 MG/1
CAPSULE, DELAYED RELEASE ORAL
COMMUNITY
Start: 2023-03-07

## 2023-03-09 RX ORDER — METOCLOPRAMIDE 5 MG/1
TABLET ORAL
COMMUNITY
Start: 2023-03-07

## 2023-03-10 LAB
C TRACH DNA SPEC QL NAA+PROBE: NEGATIVE
N GONORRHOEA DNA SPEC QL NAA+PROBE: NEGATIVE

## 2023-03-13 NOTE — PROGRESS NOTES
Assessment/Plan:    1  Encounter for annual routine gynecological examination      2  Screening for STD (sexually transmitted disease)    - Chlamydia/GC amplified DNA by PCR  - HIV 1/2 AG/AB w Reflex [facility] for 2 yr old and above; Future  - RPR-Syphilis Screening (Total Syphilis IGG/IGM); Future  - Hepatitis C antibody; Future  - Hepatitis B surface antigen; Future        Subjective      Milla Delvalle is a 36 y o  female who presents for annual exam   Cyclic symptoms: none  No intermenstrual bleeding, spotting, or discharge  She is not currently sexually active without complaints; requests STD testing  Current contraception: abstinence  History of abnormal Pap smear: no  Regular self breast exam: yes  History of abnormal mammogram: no  History of abnormal lipids: no    Menstrual History:  OB History        3    Para   3    Term   3            AB        Living   2       SAB        IAB        Ectopic        Multiple        Live Births   3           Obstetric Comments   2000 - 39 wks, , male, 7#, [facility], breast fed x 14 months;  age 8 neuroblastoma  2003 - 43 weeks, , male, 8#, [facility], breast fed x 6 months  9/15/2006 - 38 weeks, nvsd, female, 7#, [facility], breast fed x 6 weeks     Menarche age 5            Patient's last menstrual period was 2023    Period Cycle (Days):  (26-28)  Period Duration (Days): 3-4  Period Pattern: Regular  Menstrual Flow: Moderate, Light  Menstrual Control: Tampon  Menstrual Control Change Freq (Hours): 4  Dysmenorrhea: (!) Mild  Dysmenorrhea Symptoms: Cramping    Past Medical History:   Diagnosis Date   • Anxiety    • BRCA1 negative     Unsure which BRCA she was tested for   • BRCA2 negative     Unsure which BRCA she was tested for   • Chickenpox    • Cluster headache    • Depression    • Gastroparesis    • Headache, tension-type    • Migraines        Family History   Problem Relation Age of Onset   • No Known Problems Mother    • Hypertension Father    • No Known Problems Daughter    • Breast cancer Maternal Grandmother         unknown age   • Stroke Maternal Grandmother    • Cancer Maternal Grandfather         Unknown origin and age   • Breast cancer Paternal Grandmother         unknown age   • No Known Problems Paternal Grandfather    • Cancer Son 4        Neuroblastoma of abdomen   • No Known Problems Son    • Ovarian cancer Maternal Aunt         at age 28   • Breast cancer Maternal Aunt 34   • No Known Problems Maternal Aunt    • No Known Problems Maternal Aunt    • No Known Problems Maternal Aunt    • No Known Problems Maternal Aunt    • Breast cancer Paternal Aunt         dx at age 52   • No Known Problems Paternal Aunt    • Cancer Son         Neuroblastoma   • Sleep apnea Neg Hx        The following portions of the patient's history were reviewed and updated as appropriate: allergies, current medications, past family history, past medical history, past social history, past surgical history and problem list     Review of Systems  Pertinent items are noted in HPI  Objective      /52 (BP Location: Right arm, Patient Position: Sitting, Cuff Size: Standard)   Ht 5' 4 5" (1 638 m)   Wt 76 9 kg (169 lb 9 6 oz)   LMP 02/27/2023   BMI 28 66 kg/m²     General:   alert and oriented, in no acute distress   Heart:  Breasts: regular rate and rhythm   appear normal, no suspicious masses, no skin or nipple changes or axillary nodes     Lungs: effort normal   Abdomen: soft, non-tender, without masses or organomegaly   Vulva: normal   Vagina: normal mucosa   Cervix: no lesions   Uterus: normal size, mobile, non-tender   Adnexa: normal adnexa and no mass, fullness, tenderness

## 2023-04-24 ENCOUNTER — HOSPITAL ENCOUNTER (EMERGENCY)
Facility: HOSPITAL | Age: 43
Discharge: HOME | End: 2023-04-24
Attending: EMERGENCY MEDICINE | Admitting: EMERGENCY MEDICINE

## 2023-04-24 VITALS
RESPIRATION RATE: 16 BRPM | TEMPERATURE: 97.8 F | DIASTOLIC BLOOD PRESSURE: 74 MMHG | OXYGEN SATURATION: 98 % | HEART RATE: 73 BPM | SYSTOLIC BLOOD PRESSURE: 118 MMHG

## 2023-04-24 DIAGNOSIS — Z86.59 HISTORY OF ANXIETY: ICD-10-CM

## 2023-04-24 DIAGNOSIS — F41.0 PANIC ATTACK: Primary | ICD-10-CM

## 2023-04-24 LAB
ALBUMIN SERPL BCP-MCNC: 4.1 G/DL (ref 3.5–5)
ALP SERPL-CCNC: 53 U/L (ref 46–116)
ALT SERPL W P-5'-P-CCNC: 25 U/L (ref 12–78)
AMPHETAMINES SERPL QL SCN: NEGATIVE
ANION GAP SERPL CALCULATED.3IONS-SCNC: 6 MMOL/L (ref 4–13)
APAP SERPL-MCNC: <2 UG/ML (ref 10–20)
AST SERPL W P-5'-P-CCNC: 13 U/L (ref 5–45)
ATRIAL RATE: 52 BPM
BARBITURATES UR QL: NEGATIVE
BASOPHILS # BLD AUTO: 0.07 THOUSANDS/ΜL (ref 0–0.1)
BASOPHILS NFR BLD AUTO: 1 % (ref 0–1)
BENZODIAZ UR QL: NEGATIVE
BILIRUB SERPL-MCNC: 0.26 MG/DL (ref 0.2–1)
BUN SERPL-MCNC: 10 MG/DL (ref 5–25)
CALCIUM SERPL-MCNC: 8.9 MG/DL (ref 8.3–10.1)
CARDIAC TROPONIN I PNL SERPL HS: <2 NG/L
CHLORIDE SERPL-SCNC: 107 MMOL/L (ref 96–108)
CO2 SERPL-SCNC: 25 MMOL/L (ref 21–32)
COCAINE UR QL: NEGATIVE
CREAT SERPL-MCNC: 0.82 MG/DL (ref 0.6–1.3)
EOSINOPHIL # BLD AUTO: 0.15 THOUSAND/ΜL (ref 0–0.61)
EOSINOPHIL NFR BLD AUTO: 2 % (ref 0–6)
ERYTHROCYTE [DISTWIDTH] IN BLOOD BY AUTOMATED COUNT: 11.9 % (ref 11.6–15.1)
ETHANOL SERPL-MCNC: <3 MG/DL (ref 0–3)
EXT PREGNANCY TEST URINE: NEGATIVE
EXT. CONTROL: NORMAL
GFR SERPL CREATININE-BSD FRML MDRD: 87 ML/MIN/1.73SQ M
GLUCOSE SERPL-MCNC: 80 MG/DL (ref 65–140)
HCT VFR BLD AUTO: 42.3 % (ref 34.8–46.1)
HGB BLD-MCNC: 14.3 G/DL (ref 11.5–15.4)
IMM GRANULOCYTES # BLD AUTO: 0.02 THOUSAND/UL (ref 0–0.2)
IMM GRANULOCYTES NFR BLD AUTO: 0 % (ref 0–2)
LYMPHOCYTES # BLD AUTO: 1.72 THOUSANDS/ΜL (ref 0.6–4.47)
LYMPHOCYTES NFR BLD AUTO: 27 % (ref 14–44)
MCH RBC QN AUTO: 29.9 PG (ref 26.8–34.3)
MCHC RBC AUTO-ENTMCNC: 33.8 G/DL (ref 31.4–37.4)
MCV RBC AUTO: 88 FL (ref 82–98)
METHADONE UR QL: NEGATIVE
MONOCYTES # BLD AUTO: 0.72 THOUSAND/ΜL (ref 0.17–1.22)
MONOCYTES NFR BLD AUTO: 11 % (ref 4–12)
NEUTROPHILS # BLD AUTO: 3.76 THOUSANDS/ΜL (ref 1.85–7.62)
NEUTS SEG NFR BLD AUTO: 59 % (ref 43–75)
NRBC BLD AUTO-RTO: 0 /100 WBCS
OPIATES UR QL SCN: NEGATIVE
OXYCODONE+OXYMORPHONE UR QL SCN: NEGATIVE
P AXIS: 54 DEGREES
PCP UR QL: NEGATIVE
PLATELET # BLD AUTO: 244 THOUSANDS/UL (ref 149–390)
PMV BLD AUTO: 10.4 FL (ref 8.9–12.7)
POTASSIUM SERPL-SCNC: 4.1 MMOL/L (ref 3.5–5.3)
PR INTERVAL: 154 MS
PROT SERPL-MCNC: 6.5 G/DL (ref 6.4–8.4)
QRS AXIS: 25 DEGREES
QRSD INTERVAL: 88 MS
QT INTERVAL: 432 MS
QTC INTERVAL: 401 MS
RBC # BLD AUTO: 4.79 MILLION/UL (ref 3.81–5.12)
SALICYLATES SERPL-MCNC: <3 MG/DL (ref 3–20)
SODIUM SERPL-SCNC: 138 MMOL/L (ref 135–147)
T WAVE AXIS: 42 DEGREES
THC UR QL: POSITIVE
TSH SERPL DL<=0.05 MIU/L-ACNC: 1.59 UIU/ML (ref 0.45–4.5)
VENTRICULAR RATE: 52 BPM
WBC # BLD AUTO: 6.44 THOUSAND/UL (ref 4.31–10.16)

## 2023-04-24 RX ORDER — OLANZAPINE 5 MG/1
5 TABLET, ORALLY DISINTEGRATING ORAL ONCE
Status: COMPLETED | OUTPATIENT
Start: 2023-04-24 | End: 2023-04-24

## 2023-04-24 RX ORDER — DIPHENHYDRAMINE HYDROCHLORIDE 50 MG/ML
25 INJECTION INTRAMUSCULAR; INTRAVENOUS ONCE
Status: COMPLETED | OUTPATIENT
Start: 2023-04-24 | End: 2023-04-24

## 2023-04-24 RX ORDER — KETOROLAC TROMETHAMINE 30 MG/ML
15 INJECTION, SOLUTION INTRAMUSCULAR; INTRAVENOUS ONCE
Status: COMPLETED | OUTPATIENT
Start: 2023-04-24 | End: 2023-04-24

## 2023-04-24 RX ORDER — LORAZEPAM 1 MG/1
1 TABLET ORAL ONCE
Status: DISCONTINUED | OUTPATIENT
Start: 2023-04-24 | End: 2023-04-24 | Stop reason: HOSPADM

## 2023-04-24 RX ORDER — ACETAMINOPHEN 325 MG/1
975 TABLET ORAL ONCE
Status: COMPLETED | OUTPATIENT
Start: 2023-04-24 | End: 2023-04-24

## 2023-04-24 RX ORDER — OLANZAPINE 5 MG/1
5 TABLET, ORALLY DISINTEGRATING ORAL ONCE
Status: DISCONTINUED | OUTPATIENT
Start: 2023-04-24 | End: 2023-04-24

## 2023-04-24 RX ORDER — METOCLOPRAMIDE HYDROCHLORIDE 5 MG/ML
10 INJECTION INTRAMUSCULAR; INTRAVENOUS ONCE
Status: DISCONTINUED | OUTPATIENT
Start: 2023-04-24 | End: 2023-04-24

## 2023-04-24 RX ORDER — LORAZEPAM 0.5 MG/1
0.5 TABLET ORAL ONCE
Status: COMPLETED | OUTPATIENT
Start: 2023-04-24 | End: 2023-04-24

## 2023-04-24 RX ADMIN — OLANZAPINE 5 MG: 5 TABLET, ORALLY DISINTEGRATING ORAL at 19:55

## 2023-04-24 RX ADMIN — LORAZEPAM 0.5 MG: 0.5 TABLET ORAL at 14:44

## 2023-04-24 RX ADMIN — DIPHENHYDRAMINE HYDROCHLORIDE 25 MG: 50 INJECTION, SOLUTION INTRAMUSCULAR; INTRAVENOUS at 19:47

## 2023-04-24 RX ADMIN — ACETAMINOPHEN 975 MG: 325 TABLET ORAL at 19:46

## 2023-04-24 RX ADMIN — KETOROLAC TROMETHAMINE 15 MG: 30 INJECTION, SOLUTION INTRAMUSCULAR; INTRAVENOUS at 19:47

## 2023-04-24 NOTE — ED NOTES
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 so patient wasn't able to distract her thoughts  Her panic attack continued to worsen so she contacted her PCP who told her to come to the ER  Patient reports that at the end of October her and her   and shortly after she started dating someone  She expressed that her boyfriend does not open up much and doesn't support her the way she needs when she has something to talk about  She states that sometimes he can say hurtful things, and she felt overwhelmed by the lack of support she had  Patient states that she had taken an extra dose of her medications, however, nothing would calm down her anxiety  Patient expressed that while she is always anxious, she rarely has panic attacks as significant as it's been since the weekend  Patient reports some passive suicidal ideations but denies any plan or intent  She expressed that yesterday she had thoughts to cut herself so that she could feel physical pain and distract herself from her anxious thoughts  Patient denies homicidal ideations and auditory/visual hallucinations  Patient reports one previous inpatient admission at [facility] in 2016  Patient relates that at that time she had really bad suicidal thoughts after being prescribed Lyrica  Patient had previously been in outpatient therapy and psychiatry   She does not feel therapy is beneficial for her since it always surrounds the death of her son, which she thinks is pointless to continue to discuss  Patient had been seeing a psychiatrist until her PCP felt concerned that all the medications she was on was impacting her physical health  Patient's PCP has continued to prescribe her Lexapro and Kolonopin  Patient states that her sleep has been awful and nothing she tries has been a significant help  She reports decreased appetite despite feeling hungry  Patient reports a 70 pound weight loss since last summer, which she does believe was related to the medication changes  Patient does not want to return to an inpatient unit due to her experience last time, which she did not wish to disclose  However, she does recognize that she needs some type of help at this time  Patient asked for this writer to speak with her mother privately about her inpatient experience, however, shortly after patient expressed that she didn't want to be left alone because of feeling poorly since her anxiety has not yet subsided  Determination of treatment will be made once patient feels less anxious and able to discuss options thoroughly  Chief Complaint   Patient presents with   • Anxiety     Pt reports increased anxiety  Over the weekend pt reports difficulty sleeping         Intake Assessment completed, Safety risk Assessment completed    JENNA Mcdonough  04/24/23    [id number]

## 2023-04-24 NOTE — ED ATTENDING ATTESTATION
4/24/2023  Aayush JOSEPH MD, saw and evaluated the patient  I have discussed the patient with the resident/non-physician practitioner and agree with the resident's/non-physician practitioner's findings, Plan of Care, and MDM as documented in the resident's/non-physician practitioner's note, except where noted  All available labs and Radiology studies were reviewed  I was present for key portions of any procedure(s) performed by the resident/non-physician practitioner and I was immediately available to provide assistance  At this point I agree with the current assessment done in the Emergency Department  I have conducted an independent evaluation of this patient a history and physical is as follows:    OA: 37 y/o f with h/o anxiety who presents to the emergency department with worsening anxiety over the weekend and panic today attack earlier today while at work  Patient states that she felt the anxiety developing on Saturday evening  She is currently having relationship issues and was already feeling sad regarding the death of her son 14 years ago  She states that she knows that she has depression and anxiety secondary to his death and it is something that she will never recover from  She is on Lexapro as well as Klonopin for her anxiety depression however she notes that these often times do not help her  She states not having someone to talk to intensified her symptoms  She does admit to having thoughts of not wanting to be here anymore and also thoughts of cutting herself to make the pain go away  No definitive SI plan  Denies other drug use  Denies daily alcohol use  Denies daily tobacco use  Denies taking her other prescribed medications in excess  At this time she feels slightly improved but still feeling anxious and less if her heart is racing  She does admit to some sort of pressure-like sensation over her chest but feels that this is similar to previous anxiety attacks    On "physical exam patient is anxious and tearful  Vital signs currently stable  HEENT is normocephalic and atraumatic  Clear sclera and conjunctive  Neck is supple forage motion  Heart is regular rate  Lungs are clear no wheezing or rhonchi  Abdomen soft positive bowel sounds no rebound no guarding nontender to palpation  No edema  Intact distal pulses  Capillary for less than 2 seconds  Awake alert oriented depressed affect vague SI thoughts without plan  Assessment and plan we will evaluate with EKG, urine pregnancy, urine drug screen as well as, tox screen, coma panel and basic blood work  Reeval and treat accordingly  Crisis evaluation  Portions of the record may have been created with voice recognition software  Occasional wrong word or “sound-a-like\" substitutions may have occurred due to the inherent limitations of voice recognition software  Review chart carefully and recognize, using context, where substitutions have occurred  ED Course     Pt with medical workup and crisis evaluation pending at end of shift  Dispo pending workup and crisis evaluation       Critical Care Time  Procedures      "

## 2023-04-24 NOTE — ED NOTES
Patient requesting medication other than ativan for her anxiety, Resident made aware        Rossy Monreal RN  04/24/23 7375

## 2023-04-24 NOTE — ED PROVIDER NOTES
History  Chief Complaint   Patient presents with   • Anxiety     Pt reports increased anxiety  Over the weekend pt reports difficulty sleeping  58-year-old female with history of anxiety presents after a panic attack earlier today  Patient states she has been suffering from worsening anxiety since yesterday which culminated today in a panic attack which occurred at her PCP office  Patient reports hyperventilation and dyspnea  Patient took multiple as needed Klonopins without improvement in symptoms yesterday  Patient believes episode was brought on by multiple social and relationship issues  In the department today patient reports fatigue and anxiousness  Denies dyspnea, chest pain, abdominal pain, nausea, history of cardiac or thyroid disorders, SI/HI, or any other symptoms  Prior to Admission Medications   Prescriptions Last Dose Informant Patient Reported? Taking? Cholecalciferol (VITAMIN D3) 2000 units TABS   Yes No   Sig: Take 2,000 Units by mouth in the morning   Galcanezumab-gnlm (Emgality) 120 MG/ML SOAJ   No No   Sig: Inject 1 pen subcutaneously every 28 days   Iron-Vitamin C  MG TABS   Yes No   Sig: Take 1 tablet by mouth in the morning   Lasmiditan Succinate (REYVOW) 100 MG tablet   No No   Sig: Take 1 tablet (100mg)  one time as needed for migraine   Do not use more than one dose per day, or more than 8 doses per month   Linzess 290 MCG CAPS   Yes No   Sig: Take 1 capsule by mouth daily before breakfast Take 30 minutes before   Magnesium Oxide -Mg Supplement 400 MG CAPS   Yes No   Sig: Take 400 mg by mouth Daily at 2am   Melatonin 10 MG TABS   Yes No   Sig: Take 10 mg by mouth if needed   Riboflavin (B2 PO)   Yes No   Sig: Take 1 tablet by mouth in the morning   Rimegepant Sulfate (Nurtec) 75 MG TBDP   No No   Sig: Take 75 mg by mouth as needed (migraine) Limit of 1 in 24 hours   amphetamine-dextroamphetamine (ADDERALL XR) 20 MG 24 hr capsule   Yes No   Sig: Take 20 mg by mouth every morning   amphetamine-dextroamphetamine (ADDERALL) 10 mg tablet   Yes No   Sig: Take 1-2 tablets by mouth daily   amphetamine-dextroamphetamine (ADDERALL) 15 MG tablet   Yes No   Sig: Take 15 mg by mouth daily at bedtime as needed   clonazePAM (KlonoPIN) 0 5 mg tablet   Yes No   Sig: Take 0 5 mg by mouth if needed   clonazePAM (KlonoPIN) 1 mg tablet   Yes No   Sig: Take 1 mg by mouth every morning   dexamethasone (DECADRON) 2 mg tablet   No No   Sig: Decadron 2 mg 1 tab for 1-5  Days with breakfast for unrelenting migraine in moderation   escitalopram (LEXAPRO) 10 mg tablet   Yes No   Sig: Take 10 mg by mouth daily at bedtime   fluticasone (FLONASE) 50 mcg/act nasal spray   Yes No   Sig: USE 1 TO 2 SPRAYS IN EACH NOSTRIL EVERY DAY AS NEEDED   ibuprofen (MOTRIN) 800 mg tablet   No No   Sig: Take 1 tablet (800 mg total) by mouth every 6 (six) hours as needed for headaches   metoclopramide (REGLAN) 5 mg tablet   Yes No   Sig: TAKE ONE TABLET BY MOUTH TWICE A DAY  TAKE 10 MINUTES BEFORE BREAKFAST AND DINNER   metoclopramide (Reglan) 10 mg tablet   No No   Sig: Take 1 tablet (10 mg total) by mouth 4 (four) times a day   omeprazole (PriLOSEC) 20 mg delayed release capsule   Yes No   Sig: TAKE 1 CAPSULE BY MOUTH TWO TIMES A DAY   ondansetron (ZOFRAN-ODT) 4 mg disintegrating tablet   No No   Sig: Take 2 tablets (8 mg total) by mouth every 8 (eight) hours as needed for nausea or vomiting   ondansetron (ZOFRAN-ODT) 8 mg disintegrating tablet   Yes No   Sig: DISSOLVE ONE TABLET BY MOUTH EVERY DAY AS NEEDED   rizatriptan (MAXALT) 10 mg tablet   No No   Sig: TAKE 1 TABLET BY MOUTH ONCE AS NEEDED FOR MIGRAINE  MAY REPEAT DOSE IN 2 HOURS IF NEEDED   MAX 2/24 HOURS  9/MONTH      Facility-Administered Medications: None       Past Medical History:   Diagnosis Date   • Anxiety    • BRCA1 negative 2018    Unsure which BRCA she was tested for   • BRCA2 negative 2018    Unsure which BRCA she was tested for   • Chickenpox    • Cluster headache    • Depression    • Gastroparesis    • Headache, tension-type    • Migraines        Past Surgical History:   Procedure Laterality Date   • BACK SURGERY      LOWER Description: LS-S1 disc surgery   • ORTHOPEDIC SURGERY      WTE   • WISDOM TOOTH EXTRACTION  08/1999       Family History   Problem Relation Age of Onset   • No Known Problems Mother    • Hypertension Father    • No Known Problems Daughter    • Breast cancer Maternal Grandmother         unknown age   • Stroke Maternal Grandmother    • Cancer Maternal Grandfather         Unknown origin and age   • Breast cancer Paternal Grandmother         unknown age   • No Known Problems Paternal Grandfather    • Cancer Son 4        Neuroblastoma of abdomen   • No Known Problems Son    • Ovarian cancer Maternal Aunt         at age 28   • Breast cancer Maternal Aunt 34   • No Known Problems Maternal Aunt    • No Known Problems Maternal Aunt    • No Known Problems Maternal Aunt    • No Known Problems Maternal Aunt    • Breast cancer Paternal Aunt         dx at age 52   • No Known Problems Paternal Aunt    • Cancer Son         Neuroblastoma   • Sleep apnea Neg Hx      I have reviewed and agree with the history as documented  E-Cigarette/Vaping   • E-Cigarette Use Never User      E-Cigarette/Vaping Substances   • Nicotine No    • THC No    • CBD No    • Flavoring No    • Other No    • Unknown No      Social History     Tobacco Use   • Smoking status: Never   • Smokeless tobacco: Never   Vaping Use   • Vaping Use: Never used   Substance Use Topics   • Alcohol use: No   • Drug use: Yes     Types: Marijuana     Comment: Have medical marijuana card        Review of Systems   Constitutional: Negative for chills and fever  HENT: Negative for ear pain and sore throat  Eyes: Negative for pain and visual disturbance  Respiratory: Positive for shortness of breath  Negative for cough  Cardiovascular: Negative for chest pain and palpitations  Gastrointestinal: Negative for abdominal pain and vomiting  Genitourinary: Negative for dysuria and hematuria  Musculoskeletal: Negative for arthralgias and back pain  Skin: Negative for color change and rash  Neurological: Negative for seizures and syncope  Psychiatric/Behavioral: Negative for self-injury and suicidal ideas  The patient is nervous/anxious  All other systems reviewed and are negative  Physical Exam  ED Triage Vitals   Temperature Pulse Respirations Blood Pressure SpO2   04/24/23 1350 04/24/23 1350 04/24/23 1350 04/24/23 1350 04/24/23 1350   97 8 °F (36 6 °C) 73 16 118/74 98 %      Temp src Heart Rate Source Patient Position - Orthostatic VS BP Location FiO2 (%)   -- -- -- -- --             Pain Score       04/24/23 1946       8             Orthostatic Vital Signs  Vitals:    04/24/23 1350   BP: 118/74   Pulse: 73       Physical Exam  Vitals and nursing note reviewed  Constitutional:       General: She is not in acute distress  Appearance: Normal appearance  She is normal weight  She is not ill-appearing, toxic-appearing or diaphoretic  HENT:      Head: Normocephalic and atraumatic  Right Ear: External ear normal       Left Ear: External ear normal       Nose: Nose normal       Mouth/Throat:      Mouth: Mucous membranes are moist       Pharynx: Oropharynx is clear  Eyes:      General:         Right eye: No discharge  Left eye: No discharge  Extraocular Movements: Extraocular movements intact  Conjunctiva/sclera: Conjunctivae normal    Cardiovascular:      Rate and Rhythm: Normal rate and regular rhythm  Pulses: Normal pulses  Heart sounds: Normal heart sounds  No murmur heard  No friction rub  Pulmonary:      Effort: Pulmonary effort is normal  No respiratory distress  Breath sounds: Normal breath sounds  No wheezing or rales  Abdominal:      General: Abdomen is flat  Bowel sounds are normal  There is no distension  Palpations: Abdomen is soft  Tenderness: There is no abdominal tenderness  There is no guarding  Musculoskeletal:         General: Normal range of motion  Cervical back: Normal range of motion and neck supple  No tenderness  Skin:     General: Skin is warm and dry  Capillary Refill: Capillary refill takes less than 2 seconds  Coloration: Skin is not pale  Neurological:      Mental Status: She is alert and oriented to person, place, and time  Psychiatric:         Attention and Perception: Attention and perception normal          Mood and Affect: Affect normal  Mood is anxious  Speech: Speech normal          Behavior: Behavior normal  Behavior is cooperative  Thought Content: Thought content normal  Thought content is not paranoid or delusional  Thought content does not include homicidal or suicidal ideation  Thought content does not include homicidal or suicidal plan  ED Medications  Medications   LORazepam (ATIVAN) tablet 0 5 mg (0 5 mg Oral Given 4/24/23 1444)   acetaminophen (TYLENOL) tablet 975 mg (975 mg Oral Given 4/24/23 1946)   ketorolac (TORADOL) injection 15 mg (15 mg Intravenous Given 4/24/23 1947)   diphenhydrAMINE (BENADRYL) injection 25 mg (25 mg Intravenous Given 4/24/23 1947)   OLANZapine (ZyPREXA ZYDIS) dispersible tablet 5 mg (5 mg Oral Given 4/24/23 1955)       Diagnostic Studies  Results Reviewed     Procedure Component Value Units Date/Time    Rapid drug screen, urine [[id number]]  (Abnormal) Collected: 04/24/23 1620    Lab Status: Final result Specimen: Urine, Other Updated: 04/24/23 1750     Amph/Meth UR Negative     Barbiturate Ur Negative     Benzodiazepine Urine Negative     Cocaine Urine Negative     Methadone Urine Negative     Opiate Urine Negative     PCP Ur Negative     THC Urine Positive     Oxycodone Urine Negative    Narrative:      Presumptive report  If requested, specimen will be sent to reference lab for confirmation    FOR MEDICAL PURPOSES ONLY  IF CONFIRMATION NEEDED PLEASE CONTACT THE LAB WITHIN 5 DAYS  Drug Screen Cutoff Levels:  AMPHETAMINE/METHAMPHETAMINES  1000 ng/mL  BARBITURATES     200 ng/mL  BENZODIAZEPINES     200 ng/mL  COCAINE      300 ng/mL  METHADONE      300 ng/mL  OPIATES      300 ng/mL  PHENCYCLIDINE     25 ng/mL  THC       50 ng/mL  OXYCODONE      100 ng/mL    Comprehensive metabolic panel [[id number]] Collected: 04/24/23 1617    Lab Status: Final result Specimen: Blood from Arm, Right Updated: 04/24/23 1705     Sodium 138 mmol/L      Potassium 4 1 mmol/L      Chloride 107 mmol/L      CO2 25 mmol/L      ANION GAP 6 mmol/L      BUN 10 mg/dL      Creatinine 0 82 mg/dL      Glucose 80 mg/dL      Calcium 8 9 mg/dL      AST 13 U/L      ALT 25 U/L      Alkaline Phosphatase 53 U/L      Total Protein 6 5 g/dL      Albumin 4 1 g/dL      Total Bilirubin 0 26 mg/dL      eGFR 87 ml/min/1 73sq m     Narrative:      [place] guidelines for Chronic Kidney Disease (CKD):   •  Stage 1 with normal or high GFR (GFR > 90 mL/min/1 73 square meters)  •  Stage 2 Mild CKD (GFR = 60-89 mL/min/1 73 square meters)  •  Stage 3A Moderate CKD (GFR = 45-59 mL/min/1 73 square meters)  •  Stage 3B Moderate CKD (GFR = 30-44 mL/min/1 73 square meters)  •  Stage 4 Severe CKD (GFR = 15-29 mL/min/1 73 square meters)  •  Stage 5 End Stage CKD (GFR <15 mL/min/1 73 square meters)  Note: GFR calculation is accurate only with a steady state creatinine    TSH, 3rd generation with Free T4 reflex [[id number]]  (Normal) Collected: 04/24/23 1617    Lab Status: Final result Specimen: Blood from Arm, Right Updated: 04/24/23 1705     TSH 3RD GENERATON 1 590 uIU/mL     Narrative:      Patients undergoing fluorescein dye angiography may retain small amounts of fluorescein in the body for 48-72 hours post procedure  Samples containing fluorescein can produce falsely depressed TSH values   If the patient had this procedure,a specimen should be resubmitted post fluorescein clearance  HS Troponin 0hr (reflex protocol) [[id number]]  (Normal) Collected: 04/24/23 1617    Lab Status: Final result Specimen: Blood from Arm, Right Updated: 04/24/23 1702     hs TnI 0hr <2 ng/L     Salicylate level [[id number]]  (Abnormal) Collected: 04/24/23 1617    Lab Status: Final result Specimen: Blood from Arm, Right Updated: 08/80/37 7335     Salicylate Lvl <3 mg/dL     Acetaminophen level-If concentration is detectable, please discuss with medical  on call   [[id number]]  (Abnormal) Collected: 04/24/23 1617    Lab Status: Final result Specimen: Blood from Arm, Right Updated: 04/24/23 1658     Acetaminophen Level <2 ug/mL     Ethanol [[id number]]  (Normal) Collected: 04/24/23 1617    Lab Status: Final result Specimen: Blood from Arm, Right Updated: 04/24/23 1652     Ethanol Lvl <3 mg/dL     CBC and differential [[id number]] Collected: 04/24/23 1617    Lab Status: Final result Specimen: Blood from Arm, Right Updated: 04/24/23 1638     WBC 6 44 Thousand/uL      RBC 4 79 Million/uL      Hemoglobin 14 3 g/dL      Hematocrit 42 3 %      MCV 88 fL      MCH 29 9 pg      MCHC 33 8 g/dL      RDW 11 9 %      MPV 10 4 fL      Platelets 935 Thousands/uL      nRBC 0 /100 WBCs      Neutrophils Relative 59 %      Immat GRANS % 0 %      Lymphocytes Relative 27 %      Monocytes Relative 11 %      Eosinophils Relative 2 %      Basophils Relative 1 %      Neutrophils Absolute 3 76 Thousands/µL      Immature Grans Absolute 0 02 Thousand/uL      Lymphocytes Absolute 1 72 Thousands/µL      Monocytes Absolute 0 72 Thousand/µL      Eosinophils Absolute 0 15 Thousand/µL      Basophils Absolute 0 07 Thousands/µL     POCT pregnancy, urine [[id number]]  (Normal) Resulted: 04/24/23 1518    Lab Status: Final result Updated: 04/24/23 1519     EXT Preg Test, Ur Negative     Control Valid                 No orders to display         Procedures  Procedures      ED Course  ED Course as of 04/25/23 [facility] Apr 24, 2023 1943 Patient refusing further Ativan for anxiety  Refusing Reglan and Benadryl for headache  Will give Zyprexa 5 mg ODT                                       Medical Decision Making  54-year-old female with history of anxiety presents after panic attack  Patient's primary symptoms were anxiousness and hyperventilation  On my exam patient was calm and cooperative  Symptoms unlikely due to to acute ACS, thyroid pathology, PE, asthma, or any other medical condition  Plan: Antianxiolytic, reassess, crisis consult    History of anxiety: chronic illness or injury  Panic attack: acute illness or injury  Amount and/or Complexity of Data Reviewed  Labs: ordered  Risk  OTC drugs  Prescription drug management  Disposition  Final diagnoses:   Panic attack   History of anxiety     Time reflects when diagnosis was documented in both MDM as applicable and the Disposition within this note     Time User Action Codes Description Comment    4/24/2023  3:39 PM Rannie Soho Add [F41 0] Panic attack     4/24/2023  3:39 PM Rannie Soho Add [Z86 59] History of anxiety       ED Disposition     ED Disposition   Discharge    Condition   Stable    Date/Time   Mon Apr 24, 2023  3:39 PM    Comment   Nick Fisher discharge to home/self care                 MD Gerardo Hicks Most Recent Value   Sending MD Dr Mendoza Tanner up With Specialties Details Why [address] [facility], MD Family Medicine Call   [address]  [phone]            Discharge Medication List as of 4/24/2023  9:09 PM      CONTINUE these medications which have NOT CHANGED    Details   amphetamine-dextroamphetamine (ADDERALL XR) 20 MG 24 hr capsule Take 20 mg by mouth every morning, Historical Med      amphetamine-dextroamphetamine (ADDERALL) 10 mg tablet Take 1-2 tablets by mouth daily, Starting Mon 1/23/2023, Historical Med amphetamine-dextroamphetamine (ADDERALL) 15 MG tablet Take 15 mg by mouth daily at bedtime as needed, Starting Wed 3/16/2022, Historical Med      Cholecalciferol (VITAMIN D3) 2000 units TABS Take 2,000 Units by mouth in the morning, Historical Med      !! clonazePAM (KlonoPIN) 0 5 mg tablet Take 0 5 mg by mouth if needed, Starting Sat 12/14/2019, Historical Med      !! clonazePAM (KlonoPIN) 1 mg tablet Take 1 mg by mouth every morning, Historical Med      dexamethasone (DECADRON) 2 mg tablet Decadron 2 mg 1 tab for 1-5  Days with breakfast for unrelenting migraine in moderation, Normal      escitalopram (LEXAPRO) 10 mg tablet Take 10 mg by mouth daily at bedtime, Historical Med      fluticasone (FLONASE) 50 mcg/act nasal spray USE 1 TO 2 SPRAYS IN EACH NOSTRIL EVERY DAY AS NEEDED, Historical Med      Galcanezumab-gnlm (Emgality) 120 MG/ML SOAJ Inject 1 pen subcutaneously every 28 days, Normal      ibuprofen (MOTRIN) 800 mg tablet Take 1 tablet (800 mg total) by mouth every 6 (six) hours as needed for headaches, Starting Sun 10/24/2021, Normal      Iron-Vitamin C  MG TABS Take 1 tablet by mouth in the morning, Historical Med      Lasmiditan Succinate (REYVOW) 100 MG tablet Take 1 tablet (100mg)  one time as needed for migraine  Do not use more than one dose per day, or more than 8 doses per month, Normal      Linzess 290 MCG CAPS Take 1 capsule by mouth daily before breakfast Take 30 minutes before, Starting Tue 3/7/2023, Historical Med      Magnesium Oxide -Mg Supplement 400 MG CAPS Take 400 mg by mouth Daily at 2am, Historical Med      Melatonin 10 MG TABS Take 10 mg by mouth if needed, Historical Med      !! metoclopramide (Reglan) 10 mg tablet Take 1 tablet (10 mg total) by mouth 4 (four) times a day, Starting Mon 9/26/2022, Normal      !! metoclopramide (REGLAN) 5 mg tablet TAKE ONE TABLET BY MOUTH TWICE A DAY    TAKE 10 MINUTES BEFORE BREAKFAST AND DINNER, Historical Med      omeprazole (PriLOSEC) 20 mg delayed release capsule TAKE 1 CAPSULE BY MOUTH TWO TIMES A DAY, Historical Med      !! ondansetron (ZOFRAN-ODT) 4 mg disintegrating tablet Take 2 tablets (8 mg total) by mouth every 8 (eight) hours as needed for nausea or vomiting, Starting Wed 6/15/2022, Normal      !! ondansetron (ZOFRAN-ODT) 8 mg disintegrating tablet DISSOLVE ONE TABLET BY MOUTH EVERY DAY AS NEEDED, Historical Med      Riboflavin (B2 PO) Take 1 tablet by mouth in the morning, Historical Med      Rimegepant Sulfate (Nurtec) 75 MG TBDP Take 75 mg by mouth as needed (migraine) Limit of 1 in 24 hours, Starting Thu 9/29/2022, Normal      rizatriptan (MAXALT) 10 mg tablet TAKE 1 TABLET BY MOUTH ONCE AS NEEDED FOR MIGRAINE  MAY REPEAT DOSE IN 2 HOURS IF NEEDED  MAX 2/24 HOURS  9/MONTH, Normal       !! - Potential duplicate medications found  Please discuss with provider  No discharge procedures on file  PDMP Review     None           ED Provider  Attending physically available and evaluated Antoine Ram  [facility] managed the patient along with the ED Attending      Electronically Signed by         Den Iglesias MD  04/25/23 2870

## 2023-04-24 NOTE — DISCHARGE INSTRUCTIONS
Call Dr Santiago Garg office today or tomorrow to report your ongoing symptoms and ER visit  Return to ER if experience any sudden onset shortness of breath, chest pain, or thoughts of hurting yourself or others  This writer discussed the patients current presentation and recommended discharge plan with Dr Marlon Corado & Dr Faraz Rojo  They agree with the patient being discharged at this time with referrals and/or information about [facility]      The patient was Instructed to follow up with their existing provider  The patient was provided with referral information for:   [facility]      This writer and the patient completed a safety plan  The patient was provided with a copy of their safety plan with encouragement to utilize the plan following discharge  In addition, the patient was instructed to call local [facility] crisis, other crisis services, [facility] or to go to the nearest ER immediately if their situation changes at any time  This writer discussed discharge plans with the patient and family, who agrees with and understands the discharge plans           SAFETY PLAN  Warning Signs (thoughts, images, mood, behavior, situations) of a potential crisis: Worsening anxiety, panic, crying      Coping Skills (what can I do to take my mind off the problem, or to keep myself safe): talking with mom, spending time with my kids      Outside Support (who can I reach out to for support and help): mom        National Suicide Prevention Hotline:  [facility] [address] [place] [address] [phone] - [facility] [address]: [facility]: [place] [address] [place] [address] Road   [phone] - Peer Support Talk Line (1-9pm daily)  [phone] - Teen Support Talk Line (1-9pm daily)  [address] [place] [phone] - [address] [facility]

## 2023-04-24 NOTE — Clinical Note
Naomi Callahan was seen and treated in our emergency department on 4/24/2023  Diagnosis:     [facility]    She may return on this date: 04/25/2023         If you have any questions or concerns, please don't hesitate to call        Rahel Hernandez MD    ______________________________           _______________          _______________  Hospital Representative                              Date                                Time

## 2023-04-24 NOTE — Clinical Note
Nick Fisher was seen and treated in our emergency department on 4/24/2023  No restrictions            Diagnosis:     Sonny Keila    She may return on this date: 04/26/2023         If you have any questions or concerns, please don't hesitate to call        Noah Dawn MD    ______________________________           _______________          _______________  Hospital Representative                              Date                                Time

## 2023-04-25 NOTE — ED NOTES
[address]    Name and Date of Birth:  Kieran Gutiérrez 36 y o  1980    Date of Referral: April 24, 2023    Presenting Symptoms and Stressors:      Symptoms:  depression, worsening anxiety and panic attacks  Stressors:  relationship problems and death of son 14 years ago    Access to Weapons:  No    Smoking Status: none    Substance Use:  None    Suicidal Ideation: None, passive death wish, but denies any active suicidal ideation, intent or plan at present    Homicidal Ideation: None    Depressed Mood: depressed mood, sadness, ruminations, negative thoughts, difficulty sleeping, poor appetite    Germania/Hypomania: None    Psychosis: None    Agitation: No    Appetite Changes: decreased appetite    Sleep Disturbance: difficulty sleeping    Diagnoses:  1  Major Depressive Disorder, recurrent, moderate  2  Generalized Anxiety Disorder  3   PTSD    Current Psychiatrist or Therapist:    Psychiatrist: PCP managing medications  Therapist: None    Do they Require Ambulatory Assistance: No    Communication Assistance: not required     Legal Issues: None        Fabrizio Montano

## 2023-04-25 NOTE — ED NOTES
Spoke with patient once she was more calm and relaxed  Discussed various treatment options  At this time, patient thinks that a partial program is best suited for her  She does need to check with her employer as she recently started a new job and is fearful about losing it  However, she does express feeling much better over the last few hours and feels safe to be discharged home  Spoke with patient and her mother about various coping skills and awareness of negative thoughts that begin to ruminate       JENNA Thrasher  04/24/23   [id number]

## 2023-04-29 NOTE — PSYCH
Subjective:     Patient ID: Caryn Jo is a 36 y o  female  [facility] Clinical Progress Notes      Specialized Services Documentation  Therapist must complete separate progress note for each specific clinical activity in which the individual participated during the day  Other   No pre-certification needed as Reza Oliva has a [employer] insurance policy    Case Management Note  [id number]- [facility] met with Caryn Jo  Reviewed program structure, expectations and was given on call number and crisis phone numbers  Caryn Jo completed releases and OP providers/ PCP notified of admission and health care coordination form completed  Completed initial psycho-social evaluation and initial treatment goals discussed  TERRY Horta    Current suicide risk : Low     Medications changes/added/denied? No    Treatment session number: 1    Individual Case Management Visit provided today? Yes     [facility] follow up physician's orders: Admit to PHP  Read Dr Joe Parker psychiatric evaluation
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
## 2023-04-29 NOTE — BH TREATMENT PLAN
Assessment/Plan:      Diagnoses and all orders for this visit:    Generalized anxiety disorder    Major depressive disorder, recurrent, mild (HCC)    Post traumatic stress disorder (PTSD)    Attention deficit hyperactivity disorder, inattentive type          Subjective:     Patient ID: Mckinley Thompson is a 36 y o  female  [facility] Plan   AREAS OF NEED: Mckinley Thompson has been experiencing worsening anxiety and depression symptoms as evidenced by  passive suicidal ideation (no plans or intent), panic attacks, difficulty falling asleep, flashbacks to past traumas, low energy and motivation, obsessive and ruminating thoughts regarding the past, and anhedonia due unresolved grief and loss related to her son and brother, relationship stress, recent separation from ,  financial stressors, and transitioning/starting a new job  Date Initiated: 05/01/23    Strengths: Emotionally aware, caregiver    LONG TERM GOAL:   Date Initiated: 05/01/23  1 0 By the end of program, I will identify at least 3 ways my mental health as improved and 3 coping skills that I learned from program that I can use to maintain my wellness  Target Date: 05/29/23  Completion Date:       SHORT TERM OBJECTIVES:     Date Initiated: 05/01/23  1 1 By the end of program, I will identify at least 3 distraction and emotional release strategies I can use to distract myself from urges to self-harm  Revision Date:   Target Date: 05/12/23  Completion Date:     Date Initiated: 05/01/23  1 2 On a daily basis, I will select and complete 2 pleasurable or self-soothing activities for at least a 10 minute duration to increase my ability to spend quality time with myself and learn how to make myself feel better when experiencing uncomfortable sensations related to emotions I am intense emotions     Revision Date:   Target Date: 05/12/23  Completion Date:    Date Initiated: 05/01/23  1 3 I will take medications as prescribed and share questions and concerns if arise  Revision Date:  Target Date: 05/12/23  Completion Date:     Date Initiated: 05/01/23  1 4 I will identify 3 ways my supports can assist in my recovery and agree to staff/support contact as indicated  Revision Date:  Target Date: 05/12/23  Completion Date:          7 DAY REVISION:    Date Initiated:  Revision Date:   Target Date:   Completion Date:      PSYCHIATRY:  Date Initiated:  05/01/23  Medication Management and Education       Revision Date:   The person(s) responsible for carrying out the plan is Robert Mcgraw MD    NURSING/SYMPTOMOLOGY EDUCATION:   Date Initiated: 05/01/23  1 1,1 2,1 3,1 4 This therapist will provide wellness/symptoms and skill education groups three to five days weekly to educate Deleta Schools on signs and symptoms of diagnoses, skills to manage, and medication questions that will be addressed by the treatment team     Revision Date:  The person(s) responsible for carrying out the plan is Saima Echavarria MS and RENY Robles    PSYCHOLOGY:   Date Initiated: 05/01/23       1 1, 1 2, 1 4 Provide psychotherapy group 5 times per week to allow opportunity for Deleta Schools  to explore stressors and ways of coping  Revision Date:   The person(s) responsible for carrying out the plan is Yandel Caballero  ALLIED THERAPY:   Date Initiated: 05/01/23  1 1,1 2 Engage Deleta Schools in AT group 5 times per week to encourage development and use of wellness tools to decrease symptoms and promote recovery through meaningful activity  Revision Date:   The person(s) responsible for carrying out the plan is TERRY Dennis and TERRY Payne    CASE MANAGEMENT:   Date Initiated: 05/01/23      1 0 This  will meet with Deleta Schools  3-4 times weekly to assess treatment progress, discharge planning, connection to community supports and UR as indicated    Revision Date:   The person(s) responsible for carrying out the plan is Ivy Marte MT-BC    TREATMENT REVIEW/COMMENTS:     DISCHARGE CRITERIA: Identify 3 signs of progress and complete relapse prevention plan      DISCHARGE PLAN: Connect with outpatient providers  Estimated Length of Stay: 10 treatment days

## 2023-05-01 ENCOUNTER — OFFICE VISIT (OUTPATIENT)
Dept: PSYCHOLOGY | Facility: CLINIC | Age: 43
End: 2023-05-01

## 2023-05-01 ENCOUNTER — OFFICE VISIT (OUTPATIENT)
Dept: PSYCHIATRY | Facility: CLINIC | Age: 43
End: 2023-05-01

## 2023-05-01 DIAGNOSIS — F41.1 GENERALIZED ANXIETY DISORDER: Primary | ICD-10-CM

## 2023-05-01 DIAGNOSIS — F90.0 ATTENTION DEFICIT HYPERACTIVITY DISORDER, INATTENTIVE TYPE: ICD-10-CM

## 2023-05-01 DIAGNOSIS — F33.0 MAJOR DEPRESSIVE DISORDER, RECURRENT, MILD (HCC): ICD-10-CM

## 2023-05-01 DIAGNOSIS — F43.10 POST TRAUMATIC STRESS DISORDER (PTSD): ICD-10-CM

## 2023-05-01 RX ORDER — ALPRAZOLAM 1 MG/1
1 TABLET ORAL 3 TIMES DAILY
COMMUNITY
Start: 2023-04-25

## 2023-05-01 RX ORDER — METOPROLOL SUCCINATE 25 MG/1
25 TABLET, EXTENDED RELEASE ORAL EVERY MORNING
COMMUNITY
Start: 2023-04-25

## 2023-05-01 RX ORDER — DESVENLAFAXINE SUCCINATE 50 MG/1
50 TABLET, EXTENDED RELEASE ORAL DAILY
Qty: 30 TABLET | Refills: 0 | Status: SHIPPED | OUTPATIENT
Start: 2023-05-01

## 2023-05-01 NOTE — PSYCH
Visit Time    Visit Start Time: 9 AM  Visit Stop Time: 10 AM  Total Visit Duration: 60 minutes    Reason for visit: Depression, anxiety    HPI: This is a 44-year-old  female  but currently , had 3 children, her oldest son passed away when he was 6year-old  The patient currently lives with her mother in [place], [place]  She works as a  at [employer]  The patient had visited ER almost a  weeks back due to anxiety and panic attack  The patient was referred to outpatient partial program by the physician there  The patient has a history of being in mental health treatment on and off since 1999  She reported it started after her brother had passed away in a car accident and she was prescribed psychiatric medication by her primary care physician  She also reported she had been in therapy including group therapy on and off since then  Her oldest son passed away due to cancer in 2008 and patient reports she has been seeing psychiatrist and therapist on and off since then  Currently follows with Ar[facility]ulk[facility] psychiatric provider working at [facility] office  The patient had 1 psychiatric inpatient admission in 2016 due to suicidal ideation which as per patient was triggered due to taking medication Lyrica  She reports she sees therapist at [facility] but has not been very regular  The patient currently is on Lexapro 10 mg daily, Adderall XR 20 mg daily and Adderall 10 mg 2 times a day, Xanax 1 mg up to 3 times a day started 6 days back  She was on Klonopin till recently on 1mg 1 and half tablet  2 times a day  The patient did not felt benefited on Klonopin and her primary care physician switched to Xanax  She also takes melatonin 10 mg at bedtime  The patient  is seeing me today for initial evaluation  Patient reports she has struggled with anxiety since 2008 and it has been getting worse over the last 5 years    Reports current stressors includes  from her , recent job change, financial concerns  She reports she struggles with anxiety all the time  Describes anxiety as worrying all the time, racing mind, assuming worst case scenario, and at times struggling with physical symptoms including nausea, heart racing, headache, hyperventilation  Denies  rarely gets full fledged panic attack but had one 1 week back before she went to the ED  The patient reports she was taking Klonopin as prescribed but it was not helpful and her PCP switched to Xanax 6 days back  She reports on average she has been taking Xanax 1 mg 1-2 times a day  The patient also has been diagnosed with posttraumatic stress disorder in the past   Major trauma was losing her child to cancer when he was 5year-old  The patient reports she always is worried now about losing family member, or would assume worst case scenario if any of her child is sick, avoidant behavior such as Hospital where her son was admitted  She also reports struggling with flashbacks especially if there is any trigger reminding of the trauma  Denies any nightmares lately but had struggled in the past   Patient also reports significant obsessive thoughts, organizing things even if it is slightly not in place, some repetitive movements and behaviors  Patient reports she is not very comfortable being out in public places unless needed  Does get anxious about it  Though denies avoiding going out if needed  The patient reports she has struggled with depression since  after her brother   Though reports now anxiety outweighs depression  Reports it is usually present if she is very anxious  Reports mostly depression episodes last for 1 to 2 weeks    Described symptoms during depressive episode including not feeling motivated, wanting to sleep all day, not feel like eating, wanted to be in bed all the time, fleeting thoughts of not living, poor energy level, poor concentration, and anhedonia  She denies any history of suicide attempt or any self harming behavior  She has history of suicidal ideation in the past   Currently denies feeling depressed and rates it at 1 on a scale of 0-10 with 10 being the worst     Reports her sleep nowadays is not good  Reports having difficulty falling and maintaining sleep  Takes melatonin over-the-counter  Reports appetite has been better on the marijuana  Energy and focus is also better on Adderall  The patient was diagnosed with ADHD in 2009  Though denies diagnoses during her childhood but reports she thinks she had significant struggle with concentration and distractibility during her childhood  Feels her focus is much better on the medication  Though when she misses the medication will have significant difficulty focusing and being organized  Denies any history of auditory or visual hallucination  reports when she was 89-year-old she had experiencing purple things attacking her at night  denies any other visual hallucination  Denies any auditory hallucinations  Does not endorse any paranoia or delusional ideation    Denies any history of eating disorder    Review of systems:    Constitutional Negative   ENT Negative   Cardiovascular Heart Rate is Slow 54/min today   Respiratory Normal    Gastrointestinal Normal   Genitourinary Normal    Musculoskeletal Negative   Integumentary Normal    Neurological Normal    Endocrine Normal    Other Symptoms Normal        Past Psychiatric History:      Past Inpatient Psychiatric Treatment:   In Patient yes   Past Outpatient Psychiatric Treatment:    Patient currently follows with outpatient psychiatric provider   Past Suicide Attempts:    no  Past Violent Behavior:    no  Past Psychiatric Medication Trials:    Zoloft, Lexapro, Effexor XR, Cymbalta, Wellbutrin, Buspar, Klonopin, Xanax, Ativan and Adderall    Family Psychiatric History:   Family History   Problem Relation Age of Onset  No Known Problems Mother     Hypertension Father     No Known Problems Daughter     Breast cancer Maternal Grandmother         unknown age   Juan Jose Pellet Stroke Maternal Grandmother     Cancer Maternal Grandfather         Unknown origin and age   Juan Jose Pellet Breast cancer Paternal Grandmother         unknown age   Juan Jose Pellet No Known Problems Paternal Grandfather     Cancer Son 4        Neuroblastoma of abdomen    No Known Problems Son     Ovarian cancer Maternal Aunt         at age 28    Breast cancer Maternal Aunt 28    No Known Problems Maternal Aunt     No Known Problems Maternal Aunt     No Known Problems Maternal Aunt     No Known Problems Maternal Aunt     Breast cancer Paternal Aunt         dx at age 52   Juan Jose Pellet No Known Problems Paternal Aunt     Cancer Son         Neuroblastoma    Sleep apnea Neg Hx      Alcohol and substance use history: The patient reports she drinks alcohol occasionally once a week  Denies any history of alcohol abuse  Usually will drink 1 can of ice tea  Denies any substance use  Reports is on medical marijuana  Has been using for the last 1 month  Denies any other substance use history  Denies any smoking cigarettes  Reports occasionally coffee but drinks energy drinks once a day since 2008  Denies any rehab history    Social History:    Education: Associate degree  Learning Disabilities: none  Marital history:   Living arrangement, social support: The patient lives in home with mother  Support systems: good, mother   Occupational History: is employed  Functioning Relationships: strained with spouse or significant others and good relationship with children    Other Pertinent History: Legal: none    Social History     Substance and Sexual Activity   Drug Use Yes    Types: Marijuana    Comment: Have medical marijuana card       Traumatic History:       Abuse: emotional: Lost her child when he was 5year-old  Other Traumatic Events: none    The following portions of the patient's history were reviewed and updated as appropriate: allergies, current medications, past family history, past medical history, past social history, past surgical history and problem list        Mental status:  Appearance calm and cooperative  and adequate hygiene and grooming   Mood anxious   Affect affect was broad   Speech a normal rate   Thought Processes normal thought processes   Hallucinations no hallucinations present    Thought Content no delusions   Abnormal Thoughts no suicidal thoughts  and no homicidal thoughts    Orientation  oriented to person and place and time   Remote Memory short term memory intact   Attention Span concentration intact   Intellect Appears to be of Average Intelligence   Fund of Knowledge displays adequate knowledge of current events   Insight Insight intact   Judgement judgment was intact   Muscle Strength Not assessed   Language no difficulty naming common objects   Pain none   Pain Scale 0         Laboratory Results: No results found for this or any previous visit  Assessment/Plan: From evaluation of the patient today and review of her past psychiatric history I concur with her current diagnosis mentioned below under diagnosis  The patient currently has been significantly struggling with anxiety in the setting of stressors and history of past trauma  Though does not endorse any significant depression nowadays  ADHD is under control on Adderall XR    denies any SI or HI  Very motivated and future oriented  Has good support from her mother with whom she lives nowadays  Plan: I reviewed with the patient in detail pharmacological and nonpharmacological treatment option  Given the patient has been on Lexapro at the same dose for many years and does not seem to be having any benefit, I recommended switching to another alternative  The patient reports being on high dose of Lexapro in the past and it caused weight gain    Reviewed her past medication trial and the patient agrees to try Pristiq 50 mg daily for management of depression, anxiety and PTSD related anxiety  The patient  recently started on Xanax 1 mg 3 times a day but was advised to take half tablet to 1 tablet if needed rather than as a scheduled given the long-term risk of the medications including dependency, tolerance  The patient currently takes Adderall XR 20 mg daily which will be continued with no change  The patient was advised to take adderall  IR 10 mg in the afternoon only if needed  She will continue to take melatonin 10 mg at bedtime for poor sleep  The patient was recently started on metoprolol XL by her primary care physician  The patient heart rate though is currently low at 54 and was advised to not take it until she follows with her primary care physician tomorrow and review if she needs to continue  The patient was educated in detail about her current medication including benefit, risk, side effect, alternatives, contraindication, dosage and frequency  She was advised to call us if there is any concern while she is in the partial and later her primary care physician or her current psychiatric provider and to call crisis, 911 or visit nearby ER in case of any emergency or having any SI or HI  The patient agrees  Diagnoses and all orders for this visit:    Generalized anxiety disorder  -     desvenlafaxine succinate (PRISTIQ) 50 mg 24 hr tablet; Take 1 tablet (50 mg total) by mouth daily    Major depressive disorder, recurrent, mild (HCC)  -     desvenlafaxine succinate (PRISTIQ) 50 mg 24 hr tablet; Take 1 tablet (50 mg total) by mouth daily    Post traumatic stress disorder (PTSD)  -     desvenlafaxine succinate (PRISTIQ) 50 mg 24 hr tablet; Take 1 tablet (50 mg total) by mouth daily    Attention deficit hyperactivity disorder, inattentive type    Other orders  -     ALPRAZolam (XANAX) 1 mg tablet;  Take 1 mg by mouth 3 (three) times a day  -     metoprolol succinate (TOPROL-XL) 25 mg 24 hr tablet; Take 25 mg by mouth every morning          Treatment Recommendations- Risks Benefits         Immediate Medical/Psychiatric/Psychotherapy Treatments and Any Precautions: Check above    Risks, Benefits And Possible Side Effects Of Medications:  Risks, benefits, and possible side effects of medications explained to patient and patient verbalizes understanding    Controlled Medication Discussion: Discussed with patient Black Box warning on concurrent use of benzodiazepines and opioid medications including sedation, respiratory depression, coma and death  Patient understands the risk of treatment with benzodiazepines in addition to opioids and wants to continue taking those medications  [facility] Physician's Orders     Admit to: Partial Hospitalization, 5 days x per week, for 15 days  Vital signs as per protocol  Diet regular  Group Psychotherapy 9 x per week  Allied Therapy Group 6 x per week  Diagnosis:   1  Generalized anxiety disorder  desvenlafaxine succinate (PRISTIQ) 50 mg 24 hr tablet      2  Major depressive disorder, recurrent, mild (HCC)  desvenlafaxine succinate (PRISTIQ) 50 mg 24 hr tablet      3  Post traumatic stress disorder (PTSD)  desvenlafaxine succinate (PRISTIQ) 50 mg 24 hr tablet      4   Attention deficit hyperactivity disorder, inattentive type          Medications:   Current Outpatient Medications:     amphetamine-dextroamphetamine (ADDERALL XR) 20 MG 24 hr capsule, Take 20 mg by mouth every morning, Disp: , Rfl:     Cholecalciferol (VITAMIN D3) 2000 units TABS, Take 2,000 Units by mouth in the morning, Disp: , Rfl:     desvenlafaxine succinate (PRISTIQ) 50 mg 24 hr tablet, Take 1 tablet (50 mg total) by mouth daily, Disp: 30 tablet, Rfl: 0    dexamethasone (DECADRON) 2 mg tablet, Decadron 2 mg 1 tab for 1-5  Days with breakfast for unrelenting migraine in moderation, Disp: 5 tablet, Rfl: 0    fluticasone (FLONASE) 50 mcg/act nasal spray, USE 1 TO 2 SPRAYS IN [facility] NOSTRIL EVERY DAY AS NEEDED, Disp: , Rfl:     ibuprofen (MOTRIN) 800 mg tablet, Take 1 tablet (800 mg total) by mouth every 6 (six) hours as needed for headaches, Disp: 15 tablet, Rfl: 0    Iron-Vitamin C  MG TABS, Take 1 tablet by mouth in the morning, Disp: , Rfl:     Linzess 290 MCG CAPS, Take 1 capsule by mouth daily before breakfast Take 30 minutes before, Disp: , Rfl:     Magnesium Oxide -Mg Supplement 400 MG CAPS, Take 400 mg by mouth Daily at 2am, Disp: , Rfl:     Melatonin 10 MG TABS, Take 10 mg by mouth if needed, Disp: , Rfl:     omeprazole (PriLOSEC) 20 mg delayed release capsule, TAKE 1 CAPSULE BY MOUTH TWO TIMES A DAY, Disp: , Rfl:     ondansetron (ZOFRAN-ODT) 4 mg disintegrating tablet, Take 2 tablets (8 mg total) by mouth every 8 (eight) hours as needed for nausea or vomiting, Disp: 20 tablet, Rfl: 4    Riboflavin (B2 PO), Take 1 tablet by mouth in the morning, Disp: , Rfl:     rizatriptan (MAXALT) 10 mg tablet, TAKE 1 TABLET BY MOUTH ONCE AS NEEDED FOR MIGRAINE  MAY REPEAT DOSE IN 2 HOURS IF NEEDED  MAX 2/24 HOURS  9/MONTH, Disp: 9 tablet, Rfl: 6    ALPRAZolam (XANAX) 1 mg tablet, Take 1 mg by mouth 3 (three) times a day, Disp: , Rfl:     amphetamine-dextroamphetamine (ADDERALL) 10 mg tablet, Take 1-2 tablets by mouth daily (Patient not taking: Reported on 5/1/2023), Disp: , Rfl:     amphetamine-dextroamphetamine (ADDERALL) 15 MG tablet, Take 15 mg by mouth daily at bedtime as needed (Patient not taking: Reported on 5/1/2023), Disp: , Rfl:     Galcanezumab-gnlm (Emgality) 120 MG/ML SOAJ, Inject 1 pen subcutaneously every 28 days, Disp: 1 mL, Rfl: 11    Lasmiditan Succinate (REYVOW) 100 MG tablet, Take 1 tablet (100mg)  one time as needed for migraine   Do not use more than one dose per day, or more than 8 doses per month, Disp: 8 tablet, Rfl: 3    metoclopramide (Reglan) 10 mg tablet, Take 1 tablet (10 mg total) by mouth 4 (four) times a day, Disp: 20 tablet, Rfl: 6   metoclopramide (REGLAN) 5 mg tablet, TAKE ONE TABLET BY MOUTH TWICE A DAY  TAKE 10 MINUTES BEFORE BREAKFAST AND DINNER (Patient not taking: Reported on 5/1/2023), Disp: , Rfl:     metoprolol succinate (TOPROL-XL) 25 mg 24 hr tablet, Take 25 mg by mouth every morning, Disp: , Rfl:     ondansetron (ZOFRAN-ODT) 8 mg disintegrating tablet, DISSOLVE ONE TABLET BY MOUTH EVERY DAY AS NEEDED, Disp: , Rfl:     Rimegepant Sulfate (Nurtec) 75 MG TBDP, Take 75 mg by mouth as needed (migraine) Limit of 1 in 24 hours, Disp: 16 tablet, Rfl: 6    I certify that the continuation of Partial Hospitalization services is medically necessary to improve and/or maintain the patients condition and functional level, and to prevent relapse or hospitalization, and that this could not be done at a less intensive level of care  

## 2023-05-01 NOTE — PSYCH
"Subjective:    Patient ID: Nathaniel Hernandez is a 36 y o  female      [facility] Clinical Progress Notes      Specialized Services Documentation  Therapist must complete separate progress note for each specific clinical activity in which the individual participated during the day  Education Therapy   [id number]  Nathaniel Hernandez was excused due to intake during check in and goal review  [id number]  Nathaniel Hernandez engaged throughout the treatment day  Was engaged in learning related to Illness, Medication, Aftercare and Wellness Tools  Staff utilized Verbal, Written, A/V and Demonstration teaching methods  Nathaniel Hernandez shared area of learning and set a goal for outside of program to have and not cancel her conversation with her ex- so that she can \"get it over with  \"      Tx Plan Objective: 1 1, 1 2, 1 4, Therapist: Tahir Fitzgerald    Group Psychotherapy  [id number] Nathaniel Hernandez participated actively in a psychotherapy group focused on the stages of DBT by creating a personal \"DBT house  \" Participants were supplied with blank paper, drawing utensils, and an example house; then were instructed to draw a house and fill in several elements with things such as values, areas for growth, supports, ways to regulate, etc  Group followed multi-step directions, engaged in discussion, and reflected on their wellness journey  Nathaniel Hernandez asked for construction paper and other materials to create a multi-layered house  Jakob Gallardo shared a story about an art group activity that led to her associating her son that passed with suns  Robert  shared that this is why she created a sun to use as her billboard, which symbolized what you are proud of, and wrote in the name of her son and how she carried herself through his illness and passing  Continue to note progress towards goals  Continue with psychotherapy to further develop DBT skills and personal insight    Tx Plan Objective: 1 1, 1 2, 1 4 Therapist: RENY Fitzgerald    "

## 2023-05-01 NOTE — PSYCH
"Subjective:     Patient ID: Jessi Jim is a 36 y o  female  [facility] Clinical Progress Notes      Specialized Services Documentation  Therapist must complete separate progress note for each specific clinical activity in which the individual participated during the day  Allied Therapy   [id number] Group practiced \"What\" skills of mindfulness (Observe, Describe, Participate) and Wise Mind through interacting with music and art  Each client observed a picture while listening to music, deciding first if the music matched their picture  Next they described why the music did or did not match by discussing and identifying observed facts, as well as emotions and assumptions of both mediums  Therapist encouraged clients to identify when they used logical and emotional mind in their explanations  Group members then participated in the experience by changing the picture to match the music based on their discussions  Therapist emphasized that in real life, clients will need to focus on what they can change about their approach to move through situations that are out of their control  Therapist then discussed real life examples of handling emotional reactions using \"What\" Skills and [employer]  Jessi Jim was not present for AT due to initial intake  Continue AT for development and practice of \"What\" skills for mindfulness      Tx Objectives: 1 1, 1 2, 1 4  Therapist: PEDRITO Gallardo   "

## 2023-05-01 NOTE — PSYCH
Subjective:     Patient ID: Brenden Garcia is a 36 y o  female  [facility] Clinical Progress Notes      Specialized Services Documentation  Therapist must complete separate progress note for each specific clinical activity in which the individual participated during the day  Group Psychotherapy 0930-1030     This group consisted of roughly 40 minutes which focused on the science/benefits of yoga (with a chair yoga practice included) and 20 minutes of learning the science behind/practicing Emotional Freedom Tapping  The EFT discussion began with a conversation focusing around the understanding the science behind emotional freedom tapping and in which ways it can help improve lives (sleep, stress, diet, etc)  We then discussed the technique in detail and practiced it multiple times as a group  Following reflecting on the EFT practice, the group then began discussion on the research-based benefits of yoga  We then practiced chair yoga as a group  The yoga practice was also followed by a period of reflection/discussion  Brenden Garcia WAS NOT PRESENT due to intake       Tx Plan Objective: 1 1,1 2, 1 4   Therapist: Margie Kelly MS

## 2023-05-02 ENCOUNTER — OFFICE VISIT (OUTPATIENT)
Dept: PSYCHOLOGY | Facility: CLINIC | Age: 43
End: 2023-05-02

## 2023-05-02 DIAGNOSIS — F43.10 POST TRAUMATIC STRESS DISORDER (PTSD): ICD-10-CM

## 2023-05-02 DIAGNOSIS — F41.1 GENERALIZED ANXIETY DISORDER: Primary | ICD-10-CM

## 2023-05-02 DIAGNOSIS — F90.0 ATTENTION DEFICIT HYPERACTIVITY DISORDER, INATTENTIVE TYPE: ICD-10-CM

## 2023-05-02 DIAGNOSIS — F33.0 MAJOR DEPRESSIVE DISORDER, RECURRENT, MILD (HCC): ICD-10-CM

## 2023-05-02 NOTE — PSYCH
Subjective:     Patient ID: Caryn Jo is a 36 y o  female  [facility] Clinical Progress Notes      Specialized Services Documentation  Therapist must complete separate progress note for each specific clinical activity in which the individual participated during the day  Group Psychotherapy (5826-1994)  Caryn Jo engaged in a group focusing on practicing mindfulness, creating a more cohesive group environment, and allowing members to become more familiar with one another (to promote a more comfortable environment for sharing  Each group member was given a paper to write down four different unique traits or obstacles they have accomplished in life  After writing down the four things then each group member tore them into four different pieces of paper and then crumbled them up and threw in one big basket   then picked one out of the basket at a time with the group having to guess who it belonged too  Group members were encouraged to relate to similarities of other group members while also being mindful and engaging during the group  An example of the personal fact shared by Caryn Jo was that she was on the rifle team in high school and shot a hole through a dime  Mariellen Bough  will continue with life skills and psychotherapy groups  Good progress made towards treatment  Continue psychotherapy groups to encourage further exploration of needs, personal awareness, and skills      Tx Plan Objective: 1 1,1 2, 1 4   Therapist: Brown Verdin MS

## 2023-05-02 NOTE — PSYCH
Subjective:    Patient ID: Jessi Jim is a 36 y o  female      [facility] Clinical Progress Notes      Specialized Services Documentation  Therapist must complete separate progress note for each specific clinical activity in which the individual participated during the day  Education Therapy   [id number]  Jessi Jim participated actively in shared in check in and goal review  Presented as receptive related to readiness to learn  Jessi Jim  did complete goal from last treatment day identifying gaining advocacy  did not present with any barriers to learning  [id number]  Jessi Jim engaged throughout the treatment day  Was engaged in learning related to Illness, Medication, Aftercare and Wellness Tools  Staff utilized Verbal, Written, A/V and Demonstration teaching methods  Jessi Jim shared area of learning and set a goal for outside of program to have a good time with her mom and do [employer] puzzles  Tx Plan Objective: 1 1, 1 2, 1 4, Therapist: Janel Franz [place]    Group Psychotherapy  [id number] Jessi Jim participated actively in a psychotherapy group focused on values  The group  was provided with a large list of values, members identified which resonated with them, categorized those into 1-5 groups based on self-identified similarity, then selected one value within each group that encapsulated the entire group  Then using the selected values, participants added a verb to each value to see what it looks like as an actionable core value (e g  live in freedom, act with mindfulness, etc )  Art supplies were provided, including: various beads of differing types/colors, colorful strings, as well as different elastic  The group was given creative freedom to make something (key chains, bracelets, etc ) using a self-decided symbolic system that was representative to their personal life as related to their identified values   Jessi Jim advocated for herself by prompting facilitator to come over so she could share her thoughts  Marianna Be shared that she was behind with her value identification steps and very eager to make something, so she asked if she could begin working on her value art before finishing the core value written process; Marianna Be was worried she would miss the activity  Facilitator expressed appreciation for Angel Desai this writer, offered Marianna Be assistance with the written work, and reassured her that she would have as much time as she wanted with materials she could pick out before supplies were packed up at the end of group  Marianna Be completed the written componenets and selected materials she wanted to use  Continue to note progress towards goals  Continue with psychotherapy to encourage further value-alignment based progress  Tx Plan Objective 1 1, 1 2, 1 4 Therapist: RENY Vasquez    Other   [id number] This writer spoke with Marianna Haile after wrap-up and goal setting about the 0930-1030 group experience  Marianna Be shared what she had created, and expressed her learning / group interaction styles as affected by OCD and ADHD as she described them  Neyda's project was very thoughtfully created with multiple tactile functions, with meaningful color and design considerations  Mariannasloan Be expressed appreciation for this writer having her complete the written portion of the core values group before beginning the artistic portion  Marianna Haile shared that she said she would have finished the written portion at home, but she knows that she would not have in reality  Marianna Haile shared that she was pushed, in a good way, to be patient and follow through with completing meaningful written tasks, despite wanting to go right into a tactile task, which she stated is more instantly appealing to her  Marianna Haile shared how she becomes anxious in group settings when she notices her progress as it appears next to that of her peers  This writer validated Marianna Be and expressed appreciation for her sharing these things   This writer asked Marianna Be if there was anything that would help her in the future, and this writer posed the option of providing her with written materials earlier, even if only a minute or two, for her to begin processing so that she had more time to conceptualize materials for group  Montserrat Tyler expressed that this idea would be very beneficial for her

## 2023-05-02 NOTE — PSYCH
Subjective:     Patient ID: Kwabena Pathak is a 36 y o  female  [facility] Clinical Progress Notes      Specialized Services Documentation  Therapist must complete separate progress note for each specific clinical activity in which the individual participated during the day  Allied Therapy  [id number]   Clients listened, discussed, and added to a song that focused on regaining hope  Clients identified lyrics that represented hopelessness, a turning point, and hope  Therapist used answers and song lyrics to discuss autonomy and agency, defining them respectively as having control over oneself/decisions and directing the autonomy to a specific purpose  Therapist prompted group to add to the song by identifying when they feel hopeless, what it feels like physically and emotionally, why they came to program, and what they have gained from program so far  Therapist used answers to illustrate Elizebeth Mages Mind, and prompted clients to identify where they saw it within their own answers  Kwabena Pathak participated in small group discussion and presented discussion points to large group  Kwabena Pathak left the room for case management toward the end of the session   Continue AT for development and practice of autonomy and agency    Tx Objectives: 1 1, 1 2, 1 4                   Therapist: PEDRITO Pedroza

## 2023-05-02 NOTE — PSYCH
Subjective:     Patient ID: Elen Zapata is a 36 y o  female  [facility] Clinical Progress Notes      Specialized Services Documentation  Therapist must complete separate progress note for each specific clinical activity in which the individual participated during the day  Case Management Note  [id number]-  Bywrpamn updated tx plan  Discussed potential DC date of Friday as Johnny Li has ongoing fears regarding losing her job due to being out  Johnny Li also shared that she has fears with the ongoing divorce proceedings as to what to do as she will lose her insurance and if she loses her job she did not know what to do  CM reviewed the application and gave a tutorial on how to navigate the website  Johnny Li also explained feeling self-concious that sometimes it takes her long to process things that are happening in group  Discussed that she can always advocate for a couple of more minutes to complete a question or prompt  TERRY Bailey    Current suicide risk : Low     Medications changes/added/denied? No    Treatment session number: 2    Individual Case Management Visit provided today? Yes     [facility] follow up physician's orders: Continue to follow orders

## 2023-05-03 ENCOUNTER — OFFICE VISIT (OUTPATIENT)
Dept: PSYCHOLOGY | Facility: CLINIC | Age: 43
End: 2023-05-03

## 2023-05-03 DIAGNOSIS — F33.0 MAJOR DEPRESSIVE DISORDER, RECURRENT, MILD (HCC): ICD-10-CM

## 2023-05-03 DIAGNOSIS — F41.1 GENERALIZED ANXIETY DISORDER: Primary | ICD-10-CM

## 2023-05-03 DIAGNOSIS — F43.10 POST TRAUMATIC STRESS DISORDER (PTSD): ICD-10-CM

## 2023-05-03 DIAGNOSIS — F90.0 ATTENTION DEFICIT HYPERACTIVITY DISORDER, INATTENTIVE TYPE: ICD-10-CM

## 2023-05-03 NOTE — PSYCH
Subjective:     Patient ID: Melo Allan is a 36 y o  female  [facility] Clinical Progress Notes      Specialized Services Documentation  Therapist must complete separate progress note for each specific clinical activity in which the individual participated during the day  Psychotherapeutic Group (9261-6604)    The group learned about the causes of dissatisfaction and using gratitude as an antidote  The group gained a further understanding of feelings of dissatisfaction through a short video and discussions  They identified solutions to these feelings by doing writing exercises, practice gratitude journal, and discussion  The group also learned new skills to achieve a larger sense of gratitude  Melo Allan actively participated in group by watching the video, contributing in discussions, writing, reading a [employer] gratitude blessing, and reviewing a worksheet  Melo Allan participated in the group by providing feedback to a peer and sharing her own experiences  Birdsong did need redirection due to oversharing and responded well  Birdsong  is working towards goals  Continue psychotherapy groups to encourage further exploration of needs, personal awareness, and skills        Tx Plan Objective: 1 1, 1 2, 1 4 Therapist: Neeraj Messina MS

## 2023-05-03 NOTE — PSYCH
Subjective:    Patient ID: Ran Floyd is a 36 y o  female      [facility] Clinical Progress Notes      Specialized Services Documentation  Therapist must complete separate progress note for each specific clinical activity in which the individual participated during the day  Group Psychotherapy  0930-1030 Ran Floyd participated actively in a psychotherapy group focused on understanding communication  The group began with time to independently respond to provided prompts which guided participants to reflect on their motivation for improving their communication skills; members voluntarily shared after  From sharing, discussion arose from topics such as: the brain's reward/response system, developmental considerations for communication styles, identifying and voicing personal needs, etc  Members were encouraged to engage in discussions and ask questions throughout  Ran Floyd shared about learning to be an advocate through her son's cancer treatment, information from a therapist she saw regarding developmental stunting with trauma, and generally engaged in all activities throughout the group  Continue to note progress towards goals  Continue with psychotherapy to encourage further practice of healthy communication    Tx Plan Objective 1 1, 1 2, 1 4  Therapist: RENY Vasquez

## 2023-05-03 NOTE — PSYCH
"Subjective:     Patient ID: Sivan Nielson is a 36 y o  female  [facility] Clinical Progress Notes      Specialized Services Documentation  Therapist must complete separate progress note for each specific clinical activity in which the individual participated during the day  Allied Therapy  9305-5472 Group members were prompted to contribute a song that describes their current mental health experience  Discussion covered ways to understand and approach grieving friends, tools to handle anxiety and racing thoughts, self-advocacy, and when to reach out for support  Group members were challenged to think of ways they can continue advocating for their needs, as well as to identify tools to approach anxiety and grief  Group was also challenged to consider open curiosity in the midst of panic by avoiding asking \"why\", and instead asking, \"what\", \"when\", or \"how\"  Sivan Nielson shared extensively about her experience with grief and loss in response to some of the lyrics  Facilitator allowed her to continue as she appeared regulated in facial affect and tone, and group members appeared interested in her story by asking multiple follow up questions about how to approach grieving friends  Sivan Nielson was also observed providing support to a peer who expressed guilt and shame when comparing her bad circumstances to others' bad circumstances  Sivan Nielson expressed gratitude and appreciation to a peer who asked about her late son's name  Continue AT for the development and practice of recognizing and processing different areas of mental health wellness  Tx Objectives: 1 1, 1 2, 1 4  Therapist: PEDRITO Mojica     Education Therapy   [id number] Sivan Nielson actively shared in morning assessment and goal review  Presented as Receptive related to readiness to learn  Sivan Nielson did complete goal from last treatment day identifying gaining hope, advocacy, responsibility, and support   did not present with any " barriers to learning  [id number] Elvia Knight engaged throughout the treatment day  Was engaged in learning related to Illness, Medication, Aftercare and Wellness Tools  Staff utilized Verbal, Written, A/V and Demonstration teaching methods    Elvia Knight shared area of learning and set a goal for outside of program to try one of the gratitude exercises learned at program       Tx Objectives: 1 1, 1 2, 1 4  Therapist: PEDRITO Briggs

## 2023-05-03 NOTE — PSYCH
Subjective:     Patient ID: Jessi Jim is a 36 y o  female  [facility] Clinical Progress Notes      Specialized Services Documentation  Therapist must complete separate progress note for each specific clinical activity in which the individual participated during the day  Case Management Note  CM did not meet with Jessi Jim today as it was not a scheduled meeting day and Jessi Jim  did not request to meet with CM  CM will meet with Jessi Jim  on next treatment day to discuss her DC plans and what her thoughts are staying in program vs DC next Friday  MAX SarmientoBC    Current suicide risk : Low     Medications changes/added/denied? No    Treatment session number: 3    Individual Case Management Visit provided today? Yes     [facility] follow up physician's orders: Continue to follow orders

## 2023-05-04 ENCOUNTER — OFFICE VISIT (OUTPATIENT)
Dept: PSYCHOLOGY | Facility: CLINIC | Age: 43
End: 2023-05-04

## 2023-05-04 DIAGNOSIS — F33.0 MAJOR DEPRESSIVE DISORDER, RECURRENT, MILD (HCC): ICD-10-CM

## 2023-05-04 DIAGNOSIS — F43.10 POST TRAUMATIC STRESS DISORDER (PTSD): ICD-10-CM

## 2023-05-04 DIAGNOSIS — F41.1 GENERALIZED ANXIETY DISORDER: Primary | ICD-10-CM

## 2023-05-04 DIAGNOSIS — F90.0 ATTENTION DEFICIT HYPERACTIVITY DISORDER, INATTENTIVE TYPE: ICD-10-CM

## 2023-05-04 NOTE — PSYCH
Subjective:     Patient ID: Obi Weir is a 36 y o  female  [facility] Clinical Progress Notes      Specialized Services Documentation  Therapist must complete separate progress note for each specific clinical activity in which the individual participated during the day  Allied Therapy  3648-1261 Group planned to continue song share from the previous day which covered topics such as trauma responses, wise mind, HEARS, emotion identification, and coping skills  Group changed topics when one member suffered a panic attack  Group was led in a variety of mindful coping skills such as drawing breath patterns, visualization, affirmations, stretching, rainbow technique, and naming items of a preferred category that corresponded to letters of the alphabet  Obi Weir was observed wrapping herself in a blanket at the beginning of the session  Throughout the session, she was observed putting her head down, crying, and expressing to a peer that she was having a severe panic attack  Obi Weir accepted peer's prompt to engage in coping skills  Facilitator asked a  for help   led the group in a variety of coping skills and Obi Weir was observed with head down and crying through the rest of the session  Continue AT for development and practice of different strategies for mental health wellness       Tx Objectives: 1 1, 1 2, 1 4  Therapist: PEDRITO Ramirez

## 2023-05-04 NOTE — PSYCH
"Subjective:    Patient ID: Sivan Nielson is a 36 y o  female      [facility] Progress Notes      Specialized Services Documentation  Therapist must complete separate progress note for each specific clinical activity in which the individual participated during the day  Education Therapy   [id number]  Sivan Nielson participated actively in shared in check in and goal review  Presented as receptive related to readiness to learn  Sivan Nielson  did complete goal from last treatment day identifying gaining hope, education and support  did not present with any barriers to learning  2885-2060  Sivan Nielson engaged throughout the treatment day  Was engaged in learning related to Illness, Medication, Aftercare and Wellness Tools  Staff utilized Verbal, Written, A/V and Demonstration teaching methods  Sivan Nielson shared area of learning and set a goal for outside of program to discuss accountability with her mother and do self-care  Tx Plan Objective: 1 1, 1 2, 1 4, Therapist: Tahir Damon    Group Psychotherapy  2732-3240 Sivan Nielson actively participated in a psychotherapy group focused on self-compassion  The group engaged in open discussion throughout the session which related in different ways to self-compassion  The group was provided with a variety of affirmation cards and asked to pick out one to hold on to  Participants were encouraged to write theirs on the board and share aloud with the group  The group was provided with magazines, glue sticks, and scissors and given time and instruction to find, cut out, and stick to some form of paper -- either provided paper or paper from a personal notebook etc  The materials were monitored and collected during and following activity usage  Group members were encouraged to share what they came up with   Sivan Nielson shared the affirmation she made of \"I expect to be treated with respect,\" and her collage which featured words from headlines such as " "\"breaking barriers  \" Continue to note progress towards goals  Continue with psychotherapy to encourage further value-alignment based progress     Tx Plan Objective 1 1, 1 2, 1 4 Therapist: RENY Hernandez    "

## 2023-05-04 NOTE — PSYCH
Subjective:     Patient ID: Nikki Starks is a 36 y o  female  [facility] Clinical Progress Notes      Specialized Services Documentation  Therapist must complete separate progress note for each specific clinical activity in which the individual participated during the day  Group Psychoeducation  7391-1414 Per group members' request, this psychotherapy group reviewed various coping skill strategies that could be helpful for their mental health  The coping skills utilized were: TIPP, coping skill boxes, and HALT skill The group learned the skills via handouts and engaged in a paired muscle relaxation exercise as well as identified 3 items they would place in their coping box if they were to create one  Nikki Starks presented as receptively participating in group as observed by her taking notes, at other times, she sat with her eyes closed which this writer infers was her resting from her severe panic attack that occurred earlier in the day  Continue psychotherapy groups to learn various coping skills to promote wellness and alleviate symptoms  Tx objectives: 1 1, 1 2, 1 4      Therapist: MAX PayneBC      Case Management Note  [id number]- Music therapy intern requested presence in kitchen due to Sunrise being in crisis mode  When this writer arrived, Patoka had her head on the table and was sobbing  When asked for Sunrise to come with this Shira Barneston stated she was experiencing a panic attack and was unable to move and needed to be carried  This writer had all peers sit down and began engaging in mindfulness and grounding techniques  This writer observed that when Sunrise presented as calmer, at times her panic attack behavior was reinforced by a peer that was helping Patoka and Patoka was becoming overly dependent on the peer  When Patoka continued to present as unregulated, this writer recommended she take her PRN medication if she had it on her  All peers presented as regulated   Patoka experienced as less intense anxiety and was quiet  Kalyn Hancock stated prior to experiencing a panic attack she experienced racing thoughts  She began to feel anxious when she recognized she was unable to focus on groups  Her brain began to engage in catastrophic thinking stating if she went to seek help for grounding from a staff member she would miss out on group time and skills  As she continued to bring herself to the present moment with the racing thoughts, she found it more and more challenging and began to experience a panic attack  For the remainder time, this writer spent time reviewing various grounding strategies  CM reminded Quentin East that some groups the purpose is not to obtain information but to use grounding strategies as at times we may experience intense emotions and it is practice for the outside world of honoring what our body and mind needs  By the end of the grounding exercises, Quentin Carolrita was no longer crying and sitting straight in chair was provided a fidget to use to return to the group room  After prompting her to return for 12:30 PM group, Quentin East stated that she wanted to discuss the actual trigger to the panic attack  This writer stated that it would have to occur after the psychoeducation group group as this writer needed to lead group  Shree Romero became emotional and stated she did not know where to begin  Last night, she went to a softball game with her friend  Earlier in the week, she allowed the friend to borrow money for a job interview and fill up his gas tank  In his wallet, she noticed cash  He stated that it was not the money she provided him but another friend to assist him in paying rent  She questioned stating the amount you have does not make sense to that statement  Due to being questioned the friend became verbally aggressive which led to him requesting Quentin East to leave his apartment and to pack all of her belongings   Quentin East recognized she was unable to gather all of her belongings as she felt flustered  Merly Sheriff was disheartened as she is recognizing this friend is not using the money as he should be and using it support his alcoholism  She identified that she placed herself in extreme financial debt trying to support this friend by not paying her bills, opening up credit cards, etc  She felt angry that her friend lied to her and embarrassed by her actions of placing herself in debt  She was experiencing emotions of fear of having to tell her mother that she lives with what financial situation she is in  She felt conflicted as she recognizes she wants to call the friend's father and let him know that his son is abusing alcohol again and needs help  Merly Sheriff presented as tearful and unregulated at times and was continually validated and encouraged to come to the present moment and identify her action plan for the evening she identified the following:  A) Block all contact and communication with friend   B) Discuss with her mother the financial situation  Delete her online transaction accounts  Have mother hold on to all credit cards and debit cards and all transactions must be disclosed before she has access  C) Contact the friend's father with a brief script and not every situation about her concerns regarding the friend's alcoholic addition since November  D) Contacted mutual friend and ask them to collect her items from the apartment and arrange a time to meet with them to collect the items  D) Complete a self care activity even if it is taking a nap     Discussed that she will need to continue treatment next week due to the significant unresolved trigger that occurred this week  To review how the plan went and check-in with Merly Sheriff tomorrow  MAX MagañaBC    Current suicide risk : Low     Medications changes/added/denied? No    Treatment session number: 4    Individual Case Management Visit provided today?  Yes     [facility] follow up physician's orders: Continue to follow orders

## 2023-05-05 ENCOUNTER — OFFICE VISIT (OUTPATIENT)
Dept: PSYCHOLOGY | Facility: CLINIC | Age: 43
End: 2023-05-05

## 2023-05-05 DIAGNOSIS — F90.0 ATTENTION DEFICIT HYPERACTIVITY DISORDER, INATTENTIVE TYPE: ICD-10-CM

## 2023-05-05 DIAGNOSIS — F43.10 POST TRAUMATIC STRESS DISORDER (PTSD): ICD-10-CM

## 2023-05-05 DIAGNOSIS — F33.0 MAJOR DEPRESSIVE DISORDER, RECURRENT, MILD (HCC): ICD-10-CM

## 2023-05-05 DIAGNOSIS — F41.1 GENERALIZED ANXIETY DISORDER: Primary | ICD-10-CM

## 2023-05-05 NOTE — PSYCH
Visit Time    Visit Start Time: 0930  Visit Stop Time: 6614  Total Visit Duration: 1 hour    Subjective:     Patient ID: Fernanda Mujica is a 36 y o  y o  female  [facility] Clinical Progress Notes      Specialized Services Documentation  Therapist must complete separate progress note for each specific clinical activity in which the individual participated during the day  Psychotherapy Group   Fernanda Mujica actively shared in psychotherapy group exploring DBT distress tolerance crisis survival strategies  Group explored crisis survival strategies ACCEPTS, TIP, and STOP) reinforcing actions one could take to learn to tolerate stressful experiences, thoughts and urges  [place] participated in [place] examples, square breathing, and bakari fill in exercise to explore several strategies  She felt she could put effort into practicing ACCEPTS  Some progress toward goal noted  Continue group to encourage learning, practice, and home practice of skills to manage distress     Tx Plan Objective: 1 1, Therapist:  Andrzej STEWART & Alphonso Pittman RN

## 2023-05-05 NOTE — PSYCH
"Subjective:     Patient ID: Annabelle Schmitz is a 36 y o  female  [facility] Clinical Progress Notes      Specialized Services Documentation  Therapist must complete separate progress note for each specific clinical activity in which the individual participated during the day  Allied Therapy  [id number] Group members were prompted to contribute a song that describes their current mental health experience  Discussion covered self-advocacy, challenging negative assumptions, as well as finding, utilizing, and experiencing support  Group discussed the importance of expressing emotions and identified healthy and unhealthy ways to do so  Facilitator used a karaoke activity to cover [employer] of mindfulness, specifically how and when to use \"non-judgmental\" and \"one-mindfully\"   Annabelle Schmitz expressed frustration at the beginning of group about being switched to a new room with a different group of people  Facilitator listened and validated  Facilitator then challenged her to use coping skills as well as to approach from a new perspective by asking her to identify similar things about the new and old group environments  Annabelle Schmitz accepted prompting and engaged, but writer is unsure of receptivity as observed in her straight facial expression and slouched body language  Throughout the session, Annabelle Schmitz was observed providing peer support and validation to those who sang karaoke  Continue AT for the development and practice of recognizing and processing different areas of mental health wellness  Tx Objectives: 1 1, 1 2, 1 4  Therapist: PEDRITO Farooq    Note Reviewed and cosigned by MAX WhaleyBC  Education Therapy   [id number] Annabelle Schmitz actively shared in morning assessment and goal review  Presented as Receptive related to readiness to learn  Annabelle Schmitz did complete goal from last treatment day identifying gaining hope, advocacy, and responsibility   did not present with " any barriers to learning  [id number] Sarahi Anaya engaged throughout the treatment day  Was engaged in learning related to Illness, Medication, Aftercare and Wellness Tools  Staff utilized Verbal, Written, A/V and Demonstration teaching methods  Sarahi Anaya shared area of learning and set a goal for outside of program to get CBD with her medical marijuana card        Tx Objectives: 1 1, 1 2, 1 4  Therapist: PEDRITO Blanchard

## 2023-05-05 NOTE — PSYCH
Subjective:     Patient ID: Fernanda Mujica is a 36 y o  female  [facility] Clinical Progress Notes      Specialized Services Documentation  Therapist must complete separate progress note for each specific clinical activity in which the individual participated during the day  Case Management Note  [id number]- Torin Badillo briefly began to engage in attention seeking behaviors as she was wrapped in her blanket and began to state she was experiencing extreme anxiety and nausea  To empower Torin Badillo to think about skills she was asked what does she believe she need, Torin Badillo stated she needed Ginger Ale she was provided directions to walk to the cafeteria and purchase a beverage  She stated that she was super anxious as she recognized she became super close to her original group of peers and did not understand why everyone would be mixed and split up today  CM stated it was an exercise of flexibility and learning how to make ourselves feel comfortable in the uncomfortable and that today will be a day where she will learn more distress tolerance skills and that she can practice the skills throughout the day  She stated she would need to talk to me about the night prior later in the day, however, Torin Badillo never sought out this writer for a case management session  When discussing with intern, intern identified Neyda's mood completely changed after completing a music experience within the group that involved a motivating song for her  TERRY Archibald    Current suicide risk : Low     Medications changes/added/denied? No    Treatment session number: 5    Individual Case Management Visit provided today? Yes     [facility] follow up physician's orders: Continue to follow orders

## 2023-05-05 NOTE — PSYCH
Subjective:    Patient ID: Luisana Geronimo is a 36 y o  female      [facility] Clinical Progress Notes      Specialized Services Documentation  Therapist must complete separate progress note for each specific clinical activity in which the individual participated during the day  Group Psychotherapy  [id number] Luisana Geronimo participated actively in the wellness assessment, which evaluates progress on several different areas of wellness/wellbeing: physical, emotional, cognitive, vocational, social and spiritual  Clients rated their progress and discussed areas that need work  By completing and discussing areas of progress and challenges, members are connected and reminded that, in their mental health struggle, they are not alone  Topics of discussion revolved around positive experiences within each area of wellness as well as the challenging aspects to wellness within their past week  Luisana Geronimo shared that her score was higher than she thought it would be  Continue to note progress towards goals  Continue with psychotherapy to encourage the development and practice of reflecting on their mental health journey to alleviate symptoms and support wellness    Tx Plan Objective 1 1, 1 2, 1 4 Therapist: RENY Garcia

## 2023-05-08 ENCOUNTER — OFFICE VISIT (OUTPATIENT)
Dept: PSYCHIATRY | Facility: CLINIC | Age: 43
End: 2023-05-08

## 2023-05-08 ENCOUNTER — OFFICE VISIT (OUTPATIENT)
Dept: PSYCHOLOGY | Facility: CLINIC | Age: 43
End: 2023-05-08

## 2023-05-08 DIAGNOSIS — F33.1 MODERATE EPISODE OF RECURRENT MAJOR DEPRESSIVE DISORDER (HCC): Primary | ICD-10-CM

## 2023-05-08 DIAGNOSIS — F90.0 ATTENTION DEFICIT HYPERACTIVITY DISORDER, INATTENTIVE TYPE: ICD-10-CM

## 2023-05-08 DIAGNOSIS — G47.00 INSOMNIA, UNSPECIFIED TYPE: ICD-10-CM

## 2023-05-08 DIAGNOSIS — F43.10 POST TRAUMATIC STRESS DISORDER (PTSD): ICD-10-CM

## 2023-05-08 DIAGNOSIS — F41.1 GENERALIZED ANXIETY DISORDER: ICD-10-CM

## 2023-05-08 DIAGNOSIS — F41.1 GENERALIZED ANXIETY DISORDER: Primary | ICD-10-CM

## 2023-05-08 DIAGNOSIS — F33.0 MAJOR DEPRESSIVE DISORDER, RECURRENT, MILD (HCC): ICD-10-CM

## 2023-05-08 RX ORDER — TRAZODONE HYDROCHLORIDE 50 MG/1
50 TABLET ORAL
Qty: 30 TABLET | Refills: 1 | Status: SHIPPED | OUTPATIENT
Start: 2023-05-08

## 2023-05-08 NOTE — PSYCH
Subjective:     Patient ID: James Beckman is a 36 y o  female  [facility] Clinical Progress Notes      Specialized Services Documentation  Therapist must complete separate progress note for each specific clinical activity in which the individual participated during the day  Anabelle Curtis reported that she was feeling anxious and that a panic attack was beginning  She stated she ate her sandwich and wanted to know if she should take a PRN  She was reminded that beginning of panic symptoms is an acceptable time to take a PRN  She also was reminded to take a moment to find her ground skills checklist and begin implementing her coping skills  Case Management Note  [id number]- CM met with Yaima Read as per [facility] request  Yaima Read stated that she was feeling really anxious with the group dynamic that was occurring  She reported that during their group today when trying to share in group discussion, she felt as if 2 younger peers that have less experience were derailing the conversation and trying to provide advice  She did not appreciate this aspect of the group setting as well as she did not feel like she had ample time to share what she needed to  Neyda and TESSIE processed cognitive shifting of perhaps the peers are trying to work on impulse control and it is irritating and I also can take space and share what I feel is appropriate  She also was encouraged to focus on what is in her control and how she can make the group functional for her  Reiterated the importance of some group are meant to practice our grounding skills even if new skills are being taught as we have to learn how to comfort ourselves in uncomfortable situations  She also was encouraged to write down the processing question or item a therapist says and journal her thoughts when she feels there is no time or space to do so   Yaima Davidon asked for a list of marriage providers as she stated there has been discussion of her and her  attempting to get back together and one of the criteria being attending marriage therapy  She also requested information on how her supports can try and understand mental health better and support her  She was encouraged to begin completing her WRAP plan and to explore the [facility] website as they have a support group for loved ones with mental health illnesses that meets virtually and DBSA also could be an option  TERRY Vargas    Current suicide risk : Low     Medications changes/added/denied? No    Treatment session number: 6    Individual Case Management Visit provided today? Yes     [facility] follow up physician's orders: Continue to follow orders

## 2023-05-08 NOTE — PSYCH
"Subjective:    Patient ID: Reyna Garcia is a 36 y o  female      [facility] Clinical Progress Notes      Specialized Services Documentation  Therapist must complete separate progress note for each specific clinical activity in which the individual participated during the day  Education Therapy   [id number]  Reyna Garcia participated actively in shared in check in and goal review  Presented as receptive related to readiness to learn  Reyna Garcia  did complete goal from last treatment day identifying gaining education, advocacy and self-support  did not present with any barriers to learning  8041-1251  Reyna Garcia engaged throughout the treatment day  Was engaged in learning related to Illness, Medication, Aftercare and Wellness Tools  Staff utilized Verbal, Written, A/V and Demonstration teaching methods  Reyna Garcia shared area of learning and set a goal for outside of program to sort through her bills and mail, and go for a walk with her mom  Tx Plan Objective: 1 1, 1 2, 1 4, Therapist: Chely Hastings [place]    Group Psychotherapy  [id number] Reyna Garcia participated actively in a psychotherapy group focused on connecting personal growth to the five key facets of wellness (HEARS)  The group received explanations of the five facets: hope, education, advocacy, responsibility, and (self) support  The group was supplied with a corresponding writing prompt, which focused on how attending (a voluntary) program speaks to the individual choosing to show up  A third document was provided entitled \"work in progress,\" which contained space to respond, in writing or drawing, to prompts such as \"something I'm learning to accept,\" \"something I'm learning about myself,\" etc  Art and writing supplies and blank lined paper were provided  Open discussion and sharing was encouraged as appropriate throughout the group   Reyna Garcia clipped art from magazines about distorted thinking and cognitive reframing, and " connected this to how she has been trying to cope with negative thoughts lately  Continue to note progress towards goals  Continue with psychotherapy to further encourage connection to wellness     Tx Plan Objective 1 1, 1 2, 1 4 Therapist: RENY Gonzales

## 2023-05-08 NOTE — PSYCH
Subjective:     Patient ID: Agatha Polanco is a 36 y o  female  [facility] Clinical Progress Notes      Specialized Services Documentation  Therapist must complete separate progress note for each specific clinical activity in which the individual participated during the day  Allied Therapy  8612-8354 Clients considered the difference between physical growth and mental health growth, noting one as linear and the other as more complex   led bakari analysis to identify evaluative check points of mental health growth  Check points included admitting and accepting that change is needed, having patience, pacing oneself, recognizing obstacles/difficulties, and taking chances  Group members participated in a comparative instrumental improvisation related to patience  Group discussed feelings and reactions relating to being patient, and how to navigate them when practicing growth outside of program  Agatha Polanco was observed using an ice pack and blanket with her head down throughout the session, appearing to have been experiencing panic symptoms  Agatha Polanco approached a  earlier in the day about feeling a panic attack coming on  Agatha Polanco participated in the beginning grounding exercise, but did not actively participate in discussion throughout the session  Continue AT for the development and practice of assessing mental health growth      Tx Objective: 1 1, 1 2, 1 4  Therapist: PEDRITO Ortiz    Note reviewed and cosigned by TERRY Fernandez

## 2023-05-08 NOTE — PSYCH
"PHP MEDICATION MANAGEMENT NOTE        [address]    Name and Date of Birth:  Erick Ravi 36 y o  1980 MRN: [id number]    Date of Visit: May 8, 2023    Allergies   Allergen Reactions   • Lyrica [Pregabalin]      Other reaction(s): Suicidal ideation       Visit Time    Visit Start Time: 200  Visit Stop Time: 0900  Total Visit Duration: 20 minutes    SUBJECTIVE:    Dorothy Root is seen today for a follow up for PTSD, Generalized Anxiety Disorder, ADHD and Panic Disorder  She continues to experience on and off symptoms since the last visit  She is tolerating transition from Lexapro to [address] with no noted adverse effects  However, feels no improvement in her anxiety and feels depression may be worse  States over the weekend she did not want to get out of bed, with low energy and low motivation  She does understand she needs to give new medication time and that medication adjustments can be difficult, but agrees it is necessary and \"Lexapro was no longer working  \"  She is still needing clonazepam for panic attacks, although some improvement over the weekend, as compared to several panic attacks in the partial last week  She reports she wakes up early and worries and asks about \"something for sleep\", has had some success with Trazodone in the past   Will start trazodone 50 mg HS PRN  Still reports fleeting thoughts \"life is not worth living\" and frustrated with lack of progress and having to give up new job to get treatment  She denies any side effects from current psychiatric medications  PLAN:  Add Trazodone 50 mg HS PRN, continue all other psychiatric medications the same as noted below    Aware of 24 hour and weekend coverage for urgent situations accessed by calling [facility] main practice number  Continue partial hospitalization program    Diagnoses and all orders for this visit:    Moderate episode of recurrent major depressive " disorder ([facility] )    Insomnia, unspecified type  -     traZODone (DESYREL) 50 mg tablet; Take 1 tablet (50 mg total) by mouth daily at bedtime as needed for sleep May repeat x 1 as needed for sleep    Attention deficit hyperactivity disorder, inattentive type    Generalized anxiety disorder    Post traumatic stress disorder (PTSD)        Current Outpatient Medications on File Prior to Visit   Medication Sig Dispense Refill   • ALPRAZolam (XANAX) 1 mg tablet Take 1 mg by mouth 3 (three) times a day     • amphetamine-dextroamphetamine (ADDERALL XR) 20 MG 24 hr capsule Take 20 mg by mouth every morning     • amphetamine-dextroamphetamine (ADDERALL) 10 mg tablet Take 1-2 tablets by mouth daily (Patient not taking: Reported on 5/1/2023)     • amphetamine-dextroamphetamine (ADDERALL) 15 MG tablet Take 15 mg by mouth daily at bedtime as needed (Patient not taking: Reported on 5/1/2023)     • Cholecalciferol (VITAMIN D3) 2000 units TABS Take 2,000 Units by mouth in the morning     • desvenlafaxine succinate (PRISTIQ) 50 mg 24 hr tablet Take 1 tablet (50 mg total) by mouth daily 30 tablet 0   • dexamethasone (DECADRON) 2 mg tablet Decadron 2 mg 1 tab for 1-5  Days with breakfast for unrelenting migraine in moderation 5 tablet 0   • fluticasone (FLONASE) 50 mcg/act nasal spray USE 1 TO 2 SPRAYS IN EACH NOSTRIL EVERY DAY AS NEEDED     • Galcanezumab-gnlm (Emgality) 120 MG/ML SOAJ Inject 1 pen subcutaneously every 28 days 1 mL 11   • ibuprofen (MOTRIN) 800 mg tablet Take 1 tablet (800 mg total) by mouth every 6 (six) hours as needed for headaches 15 tablet 0   • Iron-Vitamin C  MG TABS Take 1 tablet by mouth in the morning     • Lasmiditan Succinate (REYVOW) 100 MG tablet Take 1 tablet (100mg)  one time as needed for migraine   Do not use more than one dose per day, or more than 8 doses per month 8 tablet 3   • Linzess 290 MCG CAPS Take 1 capsule by mouth daily before breakfast Take 30 minutes before     • Magnesium Oxide -Mg Supplement 400 MG CAPS Take 400 mg by mouth Daily at 2am     • Melatonin 10 MG TABS Take 10 mg by mouth if needed     • metoclopramide (Reglan) 10 mg tablet Take 1 tablet (10 mg total) by mouth 4 (four) times a day 20 tablet 6   • metoclopramide (REGLAN) 5 mg tablet TAKE ONE TABLET BY MOUTH TWICE A DAY  TAKE 10 MINUTES BEFORE BREAKFAST AND DINNER (Patient not taking: Reported on 5/1/2023)     • metoprolol succinate (TOPROL-XL) 25 mg 24 hr tablet Take 25 mg by mouth every morning     • omeprazole (PriLOSEC) 20 mg delayed release capsule TAKE 1 CAPSULE BY MOUTH TWO TIMES A DAY     • ondansetron (ZOFRAN-ODT) 4 mg disintegrating tablet Take 2 tablets (8 mg total) by mouth every 8 (eight) hours as needed for nausea or vomiting 20 tablet 4   • ondansetron (ZOFRAN-ODT) 8 mg disintegrating tablet DISSOLVE ONE TABLET BY MOUTH EVERY DAY AS NEEDED     • Riboflavin (B2 PO) Take 1 tablet by mouth in the morning     • Rimegepant Sulfate (Nurtec) 75 MG TBDP Take 75 mg by mouth as needed (migraine) Limit of 1 in 24 hours 16 tablet 6   • rizatriptan (MAXALT) 10 mg tablet TAKE 1 TABLET BY MOUTH ONCE AS NEEDED FOR MIGRAINE  MAY REPEAT DOSE IN 2 HOURS IF NEEDED  MAX 2/24 HOURS  9/MONTH 9 tablet 6     No current facility-administered medications on file prior to visit  Psychotherapy Provided:     Individual psychotherapy provided: Supportive counseling provided  Medications, treatment progress and treatment plan reviewed with Deacon Lao  Reassurance and supportive therapy provided  HPI ROS Appetite Changes and Sleep:     She reports fluctuating sleep pattern, early morning awakening, fluctuating appetite, low energy   Denies homicidal ideation, denies suicidal ideation    Review Of Systems:      General decreased functioning, sleep disturbances and appetite disturbances   Personality no change in personality   Constitutional feeling tired and low energy   ENT negative   Cardiovascular negative   Respiratory negative Gastrointestinal negative   Genitourinary negative   Musculoskeletal negative   Integumentary negative   Neurological negative   Endocrine negative   Other Symptoms none, all other systems are negative     Mental Status Evaluation:    Appearance Adequate hygiene and grooming   Behavior restless and fidgety   Mood anxious, depressed and irritable   Speech Normal rate and volume   Affect mood-congruent   Thought Processes Goal directed and coherent   Thought Content Does not verbalize delusional material   Associations Tightly connected   Perceptual Disturbances Denies hallucinations and does not appear to be responding to internal stimuli   Risk Potential Suicidal/Homicidal Ideation - passive death wish  Risk of Violence - No evidence of risk for violence found on assessment  Risk of Self Mutilation - No evidence of risk for self mutilation found on assessment   Orientation oriented to person, place, time/date and situation   Memory recent and remote memory grossly intact   Consciousness alert and awake   Attention/Concentration attention span and concentration appear shorter than expected for age   Insight fair   Judgement fair   Muscle Strength and Gait normal muscle strength and normal muscle tone, normal gait/station and normal balance   Motor Activity no abnormal movements   Language no difficulty naming common objects, no difficulty repeating a phrase, no difficulty writing a sentence   Fund of Knowledge adequate knowledge of current events  adequate fund of knowledge regarding past history  adequate fund of knowledge regarding vocabulary      Past Psychiatric History Update:     Inpatient Psychiatric Admission Since Last Encounter:   no  Suicide Attempt Or Self Mutilation Since Last Encounter:   no  Incidence of Violent Behavior Since Last Encounter:   no    Traumatic History Update:     New Onset of Abuse Since Last Encounter:   no  Traumatic Events Since Last Encounter:   no    Past Medical History:    Past Medical History:   Diagnosis Date   • Anxiety    • BRCA1 negative 2018    Unsure which BRCA she was tested for   • BRCA2 negative 2018    Unsure which BRCA she was tested for   • Chickenpox    • Cluster headache    • Depression    • Gastroparesis    • Headache, tension-type    • Migraines         Past Surgical History:   Procedure Laterality Date   • BACK SURGERY      LOWER Description: LS-S1 disc surgery   • ORTHOPEDIC SURGERY      WTE   • WISDOM TOOTH EXTRACTION  08/1999     Allergies   Allergen Reactions   • Lyrica [Pregabalin]      Other reaction(s): Suicidal ideation     Substance Abuse History:    Social History     Substance and Sexual Activity   Alcohol Use No     Social History     Substance and Sexual Activity   Drug Use Yes   • Types: Marijuana    Comment: Have medical marijuana card     Social History:    Social History     Socioeconomic History   • Marital status: Single     Spouse name: Not on file   • Number of children: Not on file   • Years of education: associates degree   • Highest education level: Not on file   Occupational History   • Occupation: Patent processor-   Tobacco Use   • Smoking status: Never   • Smokeless tobacco: Never   Vaping Use   • Vaping Use: Never used   Substance and Sexual Activity   • Alcohol use: No   • Drug use: Yes     Types: Marijuana     Comment: Have medical marijuana card   • Sexual activity: Not Currently     Partners: Male     Birth control/protection: Abstinence   Other Topics Concern   • Not on file   Social History Narrative    Lack of exercise     Adventist affiliation - non [employer] orthodox    Hobbies: reading, bike rides, volunteering    Employed:       Social Determinants of Health     Financial Resource Strain: Not on file   Food Insecurity: Not on file   Transportation Needs: Not on file   Physical Activity: Not on file   Stress: Not on file   Social Connections: Not on file   Intimate Partner Violence: Not on file   Housing Stability: Not on file     Family Psychiatric History:     Family History   Problem Relation Age of Onset   • No Known Problems Mother    • Hypertension Father    • No Known Problems Daughter    • Breast cancer Maternal Grandmother         unknown age   • Stroke Maternal Grandmother    • Cancer Maternal Grandfather         Unknown origin and age   • Breast cancer Paternal Grandmother         unknown age   • No Known Problems Paternal Grandfather    • Cancer Son 4        Neuroblastoma of abdomen   • No Known Problems Son    • Ovarian cancer Maternal Aunt         at age 28   • Breast cancer Maternal Aunt 34   • No Known Problems Maternal Aunt    • No Known Problems Maternal Aunt    • No Known Problems Maternal Aunt    • No Known Problems Maternal Aunt    • Breast cancer Paternal Aunt         dx at age 52   • No Known Problems Paternal Aunt    • Cancer Son         Neuroblastoma   • Sleep apnea Neg Hx      History Review: The following portions of the patient's history were reviewed and updated as appropriate: allergies, current medications, past family history, past medical history, past social history, past surgical history and problem list     OBJECTIVE:     Vital signs in last 24 hours: There were no vitals filed for this visit  Laboratory Results: I have personally reviewed all pertinent laboratory/tests results  Medications Risks/Benefits:      Risks, Benefits And Possible Side Effects Of Medications:    Discussed risks and benefits of treatment with patient including risk of suicidality, serotonin syndrome, increased QTc interval and SIADH related to treatment with antidepressants;  Risk of induction of manic symptoms in certain patient populations, risks of misuse, abuse or dependence, sedation and respiratory depression related to treatment with benzodiazepine medications, risks of cardiovascular side effects including elevated blood pressure, risk of misuse, abuse or dependence and risk of increased anxiety related to treatment with stimulant medications and risk of impaired next-day mental alertness, complex sleep-related behavior and dependence related to treatment with hypnotic medications     Controlled Medication Discussion:     Alfa Sesay has been filling controlled prescriptions on time as prescribed according to Chelsey Morelos, [place] 05/08/23    This note was not shared with the patient due to reasonable likelihood of causing patient harm

## 2023-05-08 NOTE — PSYCH
"Subjective:     Patient ID: Erick Ravi 36 y o  Female    [facility] Clinical Progress Notes      Specialized Services Documentation  Therapist must complete separate progress note for each specific clinical activity in which the individual participated during the day  Group Psychotherapy    [id number] Erick Ravi started off the conversation with seeking support realted to anxiety and was present for part of the group and then left  She went to seek out her   When returned she somewhat actively  participated in an open processing group on increasing empowerment and having an opportunity to be heard when one might feel isolated in sharing their experiences   spent time engaging group participants with open ended questions, reflective questions, and encouragement from other group members  In addition, it is important for the group to be able to receive multiple perspectives and feedback from other group members in a safe environment   provided space for members to share as they felt comfortable, active listening, encouragement, and offered support to group when warranted  This structure helped support the group in feeling cathartic, gain some interpersonal learning, group cohesiveness, altruism, and instilling hope  Erick Ravi left for most of the group  See CM note for further update  As Dorothy Root shared she felt \"overwhelmed,\" and having \"young people,\" in the group was something she did not like  minimal  progress noted towards goal  Continue with open processing therapy to provide space to support individuals in building trust, gain corrective emotional experiences, and cultivate healthier inter-dependency with others  Tx Plan Objective 1 1, 1 2, 1 4 Therapist: Carla Alanis       "

## 2023-05-09 ENCOUNTER — OFFICE VISIT (OUTPATIENT)
Dept: PSYCHOLOGY | Facility: CLINIC | Age: 43
End: 2023-05-09

## 2023-05-09 DIAGNOSIS — F43.10 POST TRAUMATIC STRESS DISORDER (PTSD): ICD-10-CM

## 2023-05-09 DIAGNOSIS — F41.1 GENERALIZED ANXIETY DISORDER: Primary | ICD-10-CM

## 2023-05-09 DIAGNOSIS — F90.0 ATTENTION DEFICIT HYPERACTIVITY DISORDER, INATTENTIVE TYPE: ICD-10-CM

## 2023-05-09 DIAGNOSIS — F33.0 MAJOR DEPRESSIVE DISORDER, RECURRENT, MILD (HCC): ICD-10-CM

## 2023-05-09 NOTE — PSYCH
"Subjective:     Patient ID: Wyatt Muñoz is a 36 y o  female  [facility] Clinical Progress Notes      Specialized Services Documentation  Therapist must complete separate progress note for each specific clinical activity in which the individual participated during the day  Allied Therapy  [id number] Clients engaged in a songwriting activity addressing the intrinsic and extrinsic motivation that led them to program  Definitions and examples of each term were presented and group members were directed to discern whether each example was intrinsic or extrinsic   invited clients to write down the reason they chose to attend program   explained that these reasons may also function as a support system in their healing journey, encouraging them to understand support outside of \"supportive people\"  Group divided into small groups and used answers as lyrics in the song  Wyatt Muñoz participated actively throughout the session in discussion and the songwriting activity  Wyatt Muñoz appeared engaged in discussion by asking for clarification on two different occasions  Wyatt Muñoz also participated in the songwriting activity through contributing lyrics and interacting with her partner  Continue AT for the development and practice of understanding personal reasons for healing      Tx Objectives: 1 1, 1 2, 1 4  Therapist: PEDRITO Siegel     Note reviewed and cosigned by TERRY You     "

## 2023-05-09 NOTE — PSYCH
Subjective:     Patient ID: Keara Downs is a 36 y o  female  [facility] Clinical Progress Notes      Specialized Services Documentation  Therapist must complete separate progress note for each specific clinical activity in which the individual participated during the day  Case Management Note  CM did not meet with Keara Downs today as it was not a scheduled meeting day and Keara Downs  did not request to meet with CM  CM will meet with Keara Downs  on next treatment day  TERRY Lennon    Current suicide risk : Low     Medications changes/added/denied? No    Treatment session number: 7    Individual Case Management Visit provided today? Yes     [facility] follow up physician's orders: Continue to follow orders

## 2023-05-09 NOTE — PSYCH
Subjective:    Patient ID: Enriqueta Post is a 36 y o  female      [facility] Clinical Progress Notes      Specialized Services Documentation  Therapist must complete separate progress note for each specific clinical activity in which the individual participated during the day  Education Therapy   [id number]  Enriqueta Post participated actively in shared in check in and goal review  Presented as receptive related to readiness to learn  Enriqueta Post  did complete goals from last treatment day identifying gaining education, responsibility and self-support  did not present with any barriers to learning  [id number]  Enriqueta Post engaged throughout the treatment day  Was engaged in learning related to Illness, Medication, Aftercare and Wellness Tools  Staff utilized Verbal, Written, A/V and Demonstration teaching methods  Enriqueta Post shared area of learning and set a goal for outside of program to deal with something at the bank with her daughter, and no matter what don't cancel on her  who texted her earlier that he has a surprise for her tonight  Tx Plan Objective: 1 1, 1 2, 1 4, Therapist: Tahir Beltran    Group Psychotherapy  [id number] Enriqueta Post actively participated in a psychotherapy group focused on control  The group began by covering control fallacies, particularly the spectrum between hyper-control and out-of-control  Facilitator introduced the concept of control through the lens of the circles of: concern, influence, and control  Explanations and examples were provided for each [place] and written/drawn on the board  Participants were given a worksheet with a blank diagram that contained the three overlapping circles to fill-in with their own examples of things that are of concern (out of their control), things that are under their influence (under their indirect control), and things under their direct control   Reflection questions were provided on the backside of the worksheet and participants were guided to work on these at their own pace, following the circles of control portion  Discussion was encouraged and prompted throughout the group, and members were asked to share any questions as they arose  Erick Ravi shared ideas during group and actively engaged  Continue to note progress towards goals  Continue with psychotherapy to strengthen awareness of control     Tx Plan Objective 1 1, 1 2, 1 4 Therapist: RENY Ann

## 2023-05-09 NOTE — PSYCH
"Subjective:     Patient ID: Maritza Mtz is a 36 y o  female  [facility] Clinical Progress Notes      Specialized Services Documentation  Therapist must complete separate progress note for each specific clinical activity in which the individual participated during the day  Psychotherapeutic Group ([id number])    The group learned about all 15 cognitive distortions  They gained a further understanding about the way they think irrationally and discovered ways to address those instincts  We went over a lot of information on distortions; examples, definitions, consequences, and ways to identify/change cognitive distortions when they occur  The group was provided a worksheet with all 15 cognitive distortions and their definitions  Each cognitive distortion was explained/analyzed by the group and members provided examples for each of the 15 distortions  The group was also provided an article detailing 7 CBT solutions to cognitive distortions and a CBT worksheet for identifying when someone is thinking with distortion  Additionally, each member was given a list (w/ descriptions) of \"Ten ways to untwist your thinking\", which described 10 different ways to identify and reframe cognitive distortions  Maritza Mtz was an active participant in this group via sharing personal anecdotes which demonstrated various cognitive distortions, taking notes, and by providing effective support/feedback to a peer  Ravin Matute is actively working on goals  Continue psychotherapy groups to encourage further exploration of needs, personal awareness, and skills      Tx Plan Objective: 1 1,1 2, 1 4   Therapist: Vijay Horn MS      " rolling walker

## 2023-05-10 ENCOUNTER — OFFICE VISIT (OUTPATIENT)
Dept: PSYCHOLOGY | Facility: CLINIC | Age: 43
End: 2023-05-10

## 2023-05-10 DIAGNOSIS — F90.0 ATTENTION DEFICIT HYPERACTIVITY DISORDER, INATTENTIVE TYPE: ICD-10-CM

## 2023-05-10 DIAGNOSIS — F33.0 MAJOR DEPRESSIVE DISORDER, RECURRENT, MILD (HCC): ICD-10-CM

## 2023-05-10 DIAGNOSIS — F41.1 GENERALIZED ANXIETY DISORDER: Primary | ICD-10-CM

## 2023-05-10 DIAGNOSIS — F43.10 POST TRAUMATIC STRESS DISORDER (PTSD): ICD-10-CM

## 2023-05-10 NOTE — PSYCH
Subjective:     Patient ID: Kathie Aguilar is a 36 y o  female  [facility] Clinical Progress Notes      Specialized Services Documentation  Therapist must complete separate progress note for each specific clinical activity in which the individual participated during the day  Case Management Note  [id number]- QN met with Jeremy Urbina to review and updated tx plan together  Jeremy Urbina created her own objectives and signed on her tx plan  No additional updates at this time  Jeremy Urbina presented as more regulated today and continues to identify signs of progress  She will be discharging on Friday 05/12/23    Note: This writer observed an initial error on her tx plan where a date was typed wrong  On the initial tx plan, this writer had 05/12/23 as the tx plan update, when actually it is 05/10/23  TERRY Manzo    Current suicide risk : Low     Medications changes/added/denied? No    Treatment session number: 8    Individual Case Management Visit provided today? Yes     [facility] follow up physician's orders: Continue to follow orders

## 2023-05-10 NOTE — PSYCH
"Subjective:     Patient ID: Carol Ann Case is a 36 y o  female  [facility] Clinical Progress Notes      Specialized Services Documentation  Therapist must complete separate progress note for each specific clinical activity in which the individual participated during the day  Allied Therapy  [id number] Clients divided into groups to read short stories about common triggering situations such as someone saying \"no offense, but you're being sensitive\" or being the recipient of a sarcastic remark  Clients identified their own potential impulsive responses to the situation  Clients identified which line in the story triggered them and identified emotions caused by it  Facilitator led discussion to identify what about the situation was triggering  Clients responded with \"I felt invalidated/unheard\", or \"I felt disrespected\"  Facilitator used this example to define and illustrate the importance of Wise Mind  Group was then prompted to pick a coping skill and a more effective response  Afterward, clients picked instruments and created a sound track to illustrate the emotions experienced at different points in the story  Carol Ann Case participated actively in small and large group discussion as well as music-making  Carol Ann Case was observed providing on-task responses and displayed awareness of others by allowing time for other people to share  Continue AT for development and practice of responding effectively to triggers  Tx Objectives: 1 1, 1 2, 1 4  Therapist: PEDRITO Guevara    Note Reviewed and Cosigned by MAX StahlBC       Education Therapy   [id number]  Carol Ann Case participated actively in shared in check in and goal review  Presented as receptive related to readiness to learn  Carol Ann Case  did complete goal from last treatment day identifying gaining hope, responsibility and self-support  did not present with any barriers to learning       [phone]  Carol Ann Case engaged throughout the " treatment day  Was engaged in learning related to Illness, Medication, Aftercare and Wellness Tools  Staff utilized Verbal, Written, A/V and Demonstration teaching methods  Joycelyn Wyman shared area of learning and set a goal for outside of program to go line dancing with her mother        Tx Plan Objective: 1 1, 1 2, 1 4, Therapist: PEDRITO Moreland

## 2023-05-10 NOTE — PSYCH
Visit Time    Visit Start Time: 5970  Visit Stop Time: 1330  Total Visit Duration: 60 minutes    Subjective:     Patient ID: Maritza Mtz is a 36 y o  y o  female  [facility] Clinical Progress Notes      Specialized Services Documentation  Therapist must complete separate progress note for each specific clinical activity in which the individual participated during the day  Group Psychotherapy  Maritza Mtz actively shared in psychotherapy  group focused on decreasing self- stigma and awareness of community supports  Ravin Matute attentively listened to Certified Peer Specialist Lennox Krabbe share her life story  Group encouraged power of learning about self, accepting illness and personal responsibility in recovery  Community resources reviewed in addition to personal resources like the [employer]  Some positive progress toward goal noted  Continue psychotherapy to encourage self -awareness and healthy engagement of supports  Tx Plan Objective: 1 1,1 4, Therapist:  Carlos STEWART;  PUSHPA Grier; Jesus Pierre RN

## 2023-05-10 NOTE — BH TREATMENT PLAN
Assessment/Plan:      Diagnoses and all orders for this visit:     Generalized anxiety disorder     Major depressive disorder, recurrent, mild (HCC)     Post traumatic stress disorder (PTSD)     Attention deficit hyperactivity disorder, inattentive type           Subjective:     Patient ID: Enriqueta Post is a 36 y o  female      [facility] Treatment Plan   AREAS OF NEED: Enriqueta Post has been experiencing worsening anxiety and depression symptoms as evidenced by  passive suicidal ideation (no plans or intent), panic attacks, difficulty falling asleep, flashbacks to past traumas, low energy and motivation, obsessive and ruminating thoughts regarding the past, and anhedonia due unresolved grief and loss related to her son and brother, relationship stress, recent separation from , Nohemy Reach transitioning/starting a new job       Date Initiated: 05/01/23     Strengths: Emotionally aware, caregiver     LONG TERM GOAL:   Date Initiated: 05/01/23  1 0 By the end of program, I will identify at least 3 ways my mental health as improved and 3 coping skills that I learned from program that I can use to maintain my wellness  Target Date: 05/29/23  Completion Date:   As of 05/10/23, identified that she is independently beginning her day without prompting and less crying spells  Coping skills learned: goal setting each night is important and keeping myself accountable, implement calming self down before approaching others to regulate myself        SHORT TERM OBJECTIVES:      Date Initiated: 05/01/23  1 1 By the end of program, I will identify at least 3 distraction and emotional release strategies I can use to distract myself from urges to self-harm     Revision Date: 05/10/23--> Reviewed and identified that would be helpful   Target Date: 05/10/23  Completion Date:      Date Initiated: 05/01/23  1 2 On a daily basis, I will select and complete 2 pleasurable or self-soothing activities for at least a 10 "minute duration to increase my ability to spend quality time with myself and learn how to make myself feel better when experiencing uncomfortable sensations related to emotions I am intense emotions  Revision Date: 05/10/23--> Needs significant improvement and recognizes the stressors from the week prevented exploring and identifies this has been a significant pattern since she was a young adult  Target Date: 05/10/23  Completion Date:     Date Initiated: 05/01/23  1 3 I will take medications as prescribed and share questions and concerns if arise  Revision Date: 05/10/23-> Continue taking  Target Date: 05/10/23  Completion Date:      Date Initiated: 05/01/23  1 4 I will identify 3 ways my supports can assist in my recovery and agree to staff/support contact as indicated  Revision Date: 05/10/23  Target Date: 05/10/23  Completion Date:            7 DAY REVISION:  1 5: To plan ahead and cope with common sudden changes or stressors, I will begin to identify what strategies and a \"pre-written\" thought script I can use when experiencing a trigger  Date Initiated: 05/10/23  Revision Date:   Target Date: 05/23/23  Completion Date:    1 6: When experiencing a trigger, I will pause and identify if my immediate thought or judgement is helpful or hurtful  If it's hurtful begin to challenge with facts and logic     Date Initiated: 05/10/23  Revision Date:   Target Date: 05/23/23  Completion Date:        PSYCHIATRY:  Date Initiated:  05/01/23  Medication Management and Education       Revision Date: 05/10/23        1 3 Continue medication management   The person(s) responsible for carrying out the plan is Lovely Fabry, MD     NURSING/SYMPTOMOLOGY EDUCATION:   Date Initiated: 05/01/23  1 1,1 2,1 3,1 4 This therapist will provide wellness/symptoms and skill education groups three to five days weekly to educate Agatha Okeefe on signs and symptoms of diagnoses, skills to manage, and medication questions that will " be addressed by the treatment team     Revision Date: 05/10/23        1 1,1 2,1 3,1 4,1 5 Continue to encourage Enriqueta Post to participate in wellness groups daily to learn about symptoms, coping strategies and warning signs to promote relapse prevention  The person(s) responsible for carrying out the plan is Tiffanie Strickland MS and [facility]     PSYCHOLOGY:   Date Initiated: 05/01/23       1 1, 1 2, 1 4 Provide psychotherapy group 5 times per week to allow opportunity for Enriqueta Post  to explore stressors and ways of coping  Revision Date: 05/10/23   1 1,1 2,1 4,1 5  Continue to provide psychotherapy group daily to Enriqueta Post and encourage sharing of stressors, skills and positive change  The person(s) responsible for carrying out the plan is Sander Grace       ALLIED THERAPY:   Date Initiated: 05/01/23  1 1,1 2 Engage Enriqueta Post in AT group 5 times per week to encourage development and use of wellness tools to decrease symptoms and promote recovery through meaningful activity  Revision Date: 05/10/23   1 1,1 2,1 5 Continue to engage Enriqueta Post to participate in AT group to practice wellness tools within program and transfer to home sharing successes and barriers through healthy task involvement  The person(s) responsible for carrying out the plan is TERRY Alcantara and TERRY Beasley     CASE MANAGEMENT:   Date Initiated: 05/01/23      1 0 This  will meet with Enriqueta Post  3-4 times weekly to assess treatment progress, discharge planning, connection to community supports and UR as indicated  Revision Date: 05/10/23   1 0 Continue to meet with Enriqueta Post 3-4 times weekly to assess growth, work toward goals, continued treatment needs, dc planning and use of supports     The person(s) responsible for carrying out the plan is TERRY Beasley     TREATMENT REVIEW/COMMENTS:      DISCHARGE CRITERIA: Identify 3 signs of progress and complete relapse prevention plan      DISCHARGE PLAN: Connect with outpatient providers  Estimated Length of Stay: 10 treatment days

## 2023-05-10 NOTE — PSYCH
"Subjective:     Patient ID: Iris Cabrera is a 36 y o  female  [facility] Clinical Progress Notes      Specialized Services Documentation  Therapist must complete separate progress note for each specific clinical activity in which the individual participated during the day  Psychotherapeutic Group ([id number])    The group learned about all 15 cognitive distortions  They gained a further understanding about the way they think irrationally and discovered ways to address those instincts  We went over a lot of information on distortions; examples, definitions, consequences, and ways to identify/change cognitive distortions when they occur  The group was provided a worksheet with all 15 cognitive distortions and their definitions  Each cognitive distortion was explained/analyzed by the group and members provided examples for each of the 15 distortions  The group was also provided an article detailing 7 CBT solutions to cognitive distortions and a CBT worksheet for identifying when someone is thinking with distortion  Additionally, each member was given a list (w/ descriptions) of \"Ten ways to untwist your thinking\", which described 10 different ways to identify and reframe cognitive distortions  Iris Cabrera was an active participant in this group via sharing her experiences with cognitive distortions (especially w/ generalizing related to her son's death), took notes, engaged in every discussion, and supported peers  Ila Mazariegos is actively working on goals  Continue psychotherapy groups to encourage further exploration of needs, personal awareness, and skills      Tx Plan Objective: 1 1,1 2, 1 4   Therapist: Meryl Weiss MS      "

## 2023-05-11 ENCOUNTER — OFFICE VISIT (OUTPATIENT)
Dept: PSYCHOLOGY | Facility: CLINIC | Age: 43
End: 2023-05-11

## 2023-05-11 DIAGNOSIS — F41.1 GENERALIZED ANXIETY DISORDER: Primary | ICD-10-CM

## 2023-05-11 DIAGNOSIS — F90.0 ATTENTION DEFICIT HYPERACTIVITY DISORDER, INATTENTIVE TYPE: ICD-10-CM

## 2023-05-11 DIAGNOSIS — F33.0 MAJOR DEPRESSIVE DISORDER, RECURRENT, MILD (HCC): ICD-10-CM

## 2023-05-11 DIAGNOSIS — F43.10 POST TRAUMATIC STRESS DISORDER (PTSD): ICD-10-CM

## 2023-05-11 NOTE — PSYCH
Visit Time    Visit Start Time: 8532  Visit Stop Time: 5531  Total Visit Duration: 60 minutes    Subjective:     Patient ID: Maritza Mtz is a 36 y o  y o  female  [facility] Clinical Progress Notes      Specialized Services Documentation  Therapist must complete separate progress note for each specific clinical activity in which the individual participated during the day  Psychotherapy Group   Becki Demarco shared in psychotherapy group focused DBT Module Interpersonal Effectiveness and [facility] skill  Engaged in tasks exploring what makes it difficult to share and strategies to improve with supports  Ravin Matute participated in discussion related to communication roadblocks  She was an active participant as group worked together to practice writing out a meaningful interaction using [facility]  Some positive progress toward goal noted  Continue psychotherapy to encourage sharing, personal advocacy and practice of wellness tools          Tx Plan Objective: 1 2,1 4, Therapist:  Carlos STEWART

## 2023-05-11 NOTE — PSYCH
Subjective:     Patient ID: Anuradha Hamilton is a 36 y o  female  [facility] Clinical Progress Notes      Specialized Services Documentation  Therapist must complete separate progress note for each specific clinical activity in which the individual participated during the day  Case Management Note  [id number]- Met with Diana Palacios to review discharge documents and fears related to discharge  Discussed planning ahead for the return to work with outlining triggers and her coping skills she wants to use  Discussed the index card method  TERRY Galindo    Current suicide risk : Low     Medications changes/added/denied? No    Treatment session number: 9    Individual Case Management Visit provided today? Yes     [facility] follow up physician's orders: Continue to follow orders

## 2023-05-11 NOTE — PSYCH
Subjective:     Patient ID: Zenaida Mohamud is a 36 y o  female  [facility] Clinical Progress Notes      Specialized Services Documentation  Therapist must complete separate progress note for each specific clinical activity in which the individual participated during the day  Allied Therapy  6429-0874 Group first participated in a music warm-up activity to practice mindfulness of the present moment  Group members were then led in an activity focused on recognizing personal tendencies in group interaction  Group members were given cards, each containing one word to a song title, and were instructed to obtain cards that make up one song title  Multiple rounds occurred with different parameters regarding communication  Discussion followed each round detailing personal tendencies, interaction with others, and how these areas relate to mental health or the outside world  At the end of each round, group members listened to one of the songs from the song title cards  Zenaida Mohamud participated actively in activities and discussion  Zenaida Mohamud engaged in discussion about how different people and different levels of need will require different levels of support, and identified ways she can prepare for uncomfortable situations  Continue AT for the development and practice of communication and navigating uncomfortable emotions      Tx Objectives: 1 1, 1 2, 1 4  Therapist: PEDRITO Guerrero     Note Reviewed and Cosigned by TERRY Black

## 2023-05-11 NOTE — PSYCH
[address] [facility]  Discharge Instructions:     Disposition: Home  Address: [address] [place] [id number]   Diagnosis:   1  Generalized anxiety disorder        2  Major depressive disorder, recurrent, mild ([address] )        3  Post traumatic stress disorder (PTSD)        4  Attention deficit hyperactivity disorder, inattentive type           Allergies (Drug/Food): Allergies   Allergen Reactions   • Lyrica [Pregabalin]      Other reaction(s): Suicidal ideation     Activity: No activity restrictions  Diet:no recommendations  Smoking Cessation:not a smoker   Diagnostic/Laboratory Orders: No labs ordered at this time  Vaccines: If you received a vaccine, please notify your family physician on your next visit  For more information, please call [phone]  Follow-up appointments/Referrals:   Medication Management  [facility]  [address]    (p): [phone]     Outpatient Therapist  [address], [address]  22[facility]  (P): [phone]  (F): [phone]  Primary Care Physician:   Kong Hicks MD   [address] [facility]  [facility] [place]   (P):  [phone]  (F):  [phone]       ICM/CTT: N/A    [facility]: [phone]  Your assigned  was [employer], [facility]  Our  is [place] [employer]  For any billing concerns, please contact: [phone] or [phone]  Intake/Referral/Evaluation (Non-Emergency) *NON INSURED FOR FUNDING:   [facility]: [facility] (Emergency) [facility]:  [address]: [phone]      [address] [phone] - [facility] Crisis/Mobile Crisis   [phone] - [facility] Crisis "  [employer]: [phone]  [facility]: [place] [address] [phone] - Crisis   [phone] - Peer Support Talk Line (1-9pm daily)  [phone] - Teen Support Talk Line (1-9pm daily)  [address] [facility] [address] ([place]) [phone] - [facility] Crisis           Peer/Warm Lines:     Citlali Hennessy Line: [phone]     HOURS: 6 AM to 2 AM DAILY      Suicide Hotlines  Suicide and Crisis Lifeline:  Call or text 65     Other options for the suicide and crisis lifeline:   Send a chat on [email or web address]chat   Call [phone] (5-869-407-TALK)    For the Suicide Hotline, call 0-677.390.8822 (9-769-MXPNLGG)- Hours 24/7  National Suicide Crisis Hotline: 7-232-799-897.155.3959  Other Important Resources:       Call your local crisis emergency number (911 in the [address]) if:   • You think about harming yourself or someone else  • You have done something on purpose to hurt yourself or feel unsafe and that you may do something significant to harm yourself  • You are in immediate danger of harming yourself or others  Common early warning signs that my mental health is worsening:   • Increase levels of anger, worry, anxiousness, or irritability  • Lack of energy  • Sadness or emptiness; feelings of hopelessness; ongoing passive death wishes (I e   \"Life would be better off without me\")  • Crying for long periods  • Low self-esteem or sense of worthlessness  • Negative thoughts or not caring about anything  • Too much or too little sleep  • Isolating self from others  • Engaging in dangerous or excessive risk-taking behaviors  • No longer following a routine or completing ADLs or extreme difficulty engaging in these activities   • Missing " work for extended period of times and/or excessive tardiness and absences  • Feeling confused, forgetful, or unexplained brain fogginess that interferes with daily functioning  • Changes in appetite and/or significant weight loss or weight gain that is not a part of implementing a healthier lifestyle change    Call your therapist or doctor if:   • Your symptoms do not improve  • You cannot make it to your next appointment  • You have new symptoms  • You have questions or concerns about your condition or care  How to maintain wellness: From attending the [facility] program, please take a look at your WRAP (wellness recovery action) and outline your coping skills, self care strategies, important phone numbers, triggers, and early warning signs  This document can continue to be updated and reviewed across time  How to prepare for my medication appointments:  Your healthcare provider will monitor your progress at follow-up visits  Your outpatient medication management provider will also monitor your medications  Your healthcare provider will ask if the medicine is helping  It is important to be honest about what mental health symptoms you are experiencing and side effects or problems you are experiencing with your medicine  The type or amount of medicine may need to be changed  One way to prepare for a medication visit is to write down your questions so you remember to ask them during your visits  For more information or support:   [employer] on Mental Illness  [address]  [facility]  , [address] [place] [address] , [address] [place] [address]  Phone: [phone]  Phone: [phone]  Web Address: http://[employer]/  Social Media Networks    [facility] Suicide and [address] [place] [address]  Phone: [phone]  Web Address: [employer]  org OR [email or web address]/chat/         _________________________________    I, the undersigned, have received and understand the above instructions  Patient/Rep Signature: __________________________________       Date/Time: ______________         Relationship: __________________________________________       Date/Time: ______________         Physician Signature: ____________________________________      Date/Time: ______________               Signature: ________________________________       Date/Time: ______________

## 2023-05-11 NOTE — PSYCH
Subjective:    Patient ID: Kathie Aguilar is a 36 y o  female      [facility] Clinical Progress Notes      Specialized Services Documentation  Therapist must complete separate progress note for each specific clinical activity in which the individual participated during the day  Education Therapy   9432-0927  Kathie Aguilar participated actively in shared in check in and goal review  Presented as receptive related to readiness to learn  Kathie Aguilar  did complete goal from last treatment day identifying gaining hope, advocacy and self-support  did not present with any barriers to learning  4864-6059  Kathie Aguilar engaged throughout the treatment day  Was engaged in learning related to Illness, Medication, Aftercare and Wellness Tools  Staff utilized Verbal, Written, A/V and Demonstration teaching methods  Kathie Aguilar shared area of learning and set a goal for outside of program to make kevin with her mom        Tx Plan Objective: 1 1, 1 2, 1 4, Therapist: RENY Fowler

## 2023-05-11 NOTE — PSYCH
Subjective:     Patient ID: Enriqueta Post 36 y o  Female    [facility] Clinical Progress Notes      Specialized Services Documentation  Therapist must complete separate progress note for each specific clinical activity in which the individual participated during the day  Group Psychotherapy    0930-1030 Enriqueta Post actively participated in an open processing group on increasing empowerment and having an opportunity to be heard when one might feel isolated in sharing their experiences   spent time engaging group participants with open ended questions, reflective questions, and encouragement from other group members  In addition, it is important for the group to be able to receive multiple perspectives and feedback from other group members in a safe environment   provided space for members to share as they felt comfortable, active listening, encouragement, and offered support to group when warranted  This structure helped support the group in feeling cathartic, gain some interpersonal learning, group cohesiveness, altruism, and instilling hope  Enriqueta Post contributed to discussion by sharing about the topic discussed, relating to group members, and allowing self to be open/vulnerable  [employer] was also a good support to those sharing and was open to feedback   good  progress noted towards goal  Continue with open processing therapy to provide space to support individuals in building trust, gain corrective emotional experiences, and cultivate healthier inter-dependency with others  Tx Plan Objective 1 1, 1 2, 1 4 Therapist: Lou Orosco

## 2023-05-12 ENCOUNTER — OFFICE VISIT (OUTPATIENT)
Dept: PSYCHOLOGY | Facility: CLINIC | Age: 43
End: 2023-05-12

## 2023-05-12 DIAGNOSIS — F33.0 MAJOR DEPRESSIVE DISORDER, RECURRENT, MILD (HCC): ICD-10-CM

## 2023-05-12 DIAGNOSIS — F41.1 GENERALIZED ANXIETY DISORDER: Primary | ICD-10-CM

## 2023-05-12 DIAGNOSIS — F90.0 ATTENTION DEFICIT HYPERACTIVITY DISORDER, INATTENTIVE TYPE: ICD-10-CM

## 2023-05-12 DIAGNOSIS — F43.10 POST TRAUMATIC STRESS DISORDER (PTSD): ICD-10-CM

## 2023-05-12 NOTE — PSYCH
Subjective:     Patient ID: Jesusita Gant is a 36 y o  female  [facility] Clinical Progress Notes      Specialized Services Documentation  Therapist must complete separate progress note for each specific clinical activity in which the individual participated during the day  Case Management Note  9276-3538--Met with Jesusita Gant  Reviewed relapse prevention plan, aftercare plan, crisis plan interventions, discharge instructions, and medication list (copies provided)  Denied SI, HI, and psychosis  Aftercare providers to receive summary  Frank Musa 3 ways their mental has improved: less crying spells, independently beginning her day without needing prompts from others, and feeling more hopeful about her future  3 takeaways from program include: identifying the importance of self-soothing before immediately seeking out others for verbal processing as opposed to her prior behaviors where she immediately sought out others to regulate her without attempting to sit with her emotions, the importance of setting a goal or intention daily to complete, and learning how to use music to self-soothe as music used to be triggering for her  Things they would like to continue to work on in OP therapy:  processing their behaviors and emotions/making improvements so they are able to return home to live with their  and children; process upcoming fears and anxieties with return to work and upcoming summer job      TERRY Kothari    Current suicide risk : Low    Medications changes/added/denied? No    Treatment session number: 10    Individual Case Management Visit provided today? Yes     recommendations: attend follow up appointments, take medication as prescribed, utilize WRAP, consider support groups and/or volunteer opportunities     [facility] follow up physician's orders: Discharge from [facility] to OP providers   See Dr Alejandra Hart co-sign on discharge order below      DATE 05/12/23  TIME 1400  DISCHARGE TODAY  Dr Candelaria Witt
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
## 2023-05-13 NOTE — PSYCH
Assessment/Plan:      Diagnoses and all orders for this visit:     Generalized anxiety disorder     Major depressive disorder, recurrent, mild (HCC)     Post traumatic stress disorder (PTSD)     Attention deficit hyperactivity disorder, inattentive type           Subjective:     Patient ID: Neyda Brooks is a 36 y o  female      [facility]   AREAS OF NEED: Salvador Alegria been experiencing worsening anxiety and depression symptoms as evidenced by  passive suicidal ideation (no plans or intent), panic attacks, difficulty falling asleep, flashbacks to past traumas, low energy and motivation, obsessive and ruminating thoughts regarding the past, and anhedonia due unresolved grief and loss related to her son and brother, relationship stress, recent separation from , [place] transitioning/starting a new job       Date Initiated: 05/01/23     Strengths: Emotionally aware, caregiver     LONG TERM GOAL:   Date Initiated: 05/01/23  1 0 By the end of program, I will identify at least 3 ways my mental health as improved and 3 coping skills that I learned from program that I can use to maintain my wellness  Target Date: 05/29/23  Completion Date: 05/12/23  As of 05/10/23, identified that she is independently beginning her day without prompting and less crying spells  Coping skills learned: goal setting each night is important and keeping myself accountable, implement calming self down before approaching others to regulate myself        SHORT TERM OBJECTIVES:      Date Initiated: 05/01/23  1 1 By the end of program, I will identify at least 3 distraction and emotional release strategies I can use to distract myself from urges to self-harm     Revision Date: 05/10/23--> Reviewed and identified that would be helpful   Target Date: 05/10/23  Completion Date: 05/12/23- While [facility] has reviewed and identified which strategies she is interesting in trying, [facility] needs to begin practicing the "skills on a daily basis to release emotions to decrease the chances of emotional tension being stored increasing the likelihood of experiencing urges to self harm     Date Initiated: 05/01/23  1 2 On a daily basis, I will select and complete 2 pleasurable or self-soothing activities for at least a 10 minute duration to increase my ability to spend quality time with myself and learn how to make myself feel better when experiencing uncomfortable sensations related to emotions I am intense emotions  Revision Date: 05/10/23--> Needs significant improvement and recognizes the stressors from the week prevented exploring and identifies this has been a significant pattern since she was a young adult  Target Date: 05/10/23  Completion Date: 05/12/23-> Identified this is a significant area of improvement- has difficulty identifying her own interests and spending time alone as typically she engages in others interests and copies their hobbies/interests     Date Initiated: 05/01/23  1 3 I will take medications as prescribed and share questions and concerns if arise     Revision Date: 05/10/23-> Continue taking  Target Date: 05/10/23  Completion Date: 05/12/23     Date Initiated: 05/01/23  1 4 I will identify 3 ways my supports can assist in my recovery and agree to staff/support contact as indicated     Revision Date: 05/10/23  Target Date: 05/10/23  Completion Date: 05/12/23- 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 and children; defined ways to educate supports with WRAP plan as well as provide family support groups for supports to learn about mental health; exploring trauma and marriage therapists            7 DAY REVISION:  1 5: To plan ahead and cope with common sudden changes or stressors, I will begin to identify what strategies and a \"pre-written\" thought script I can use when experiencing a trigger    Date Initiated: 05/10/23  Revision Date: N/A  Target Date: 05/23/23  Completion Date: " "05/12/23- to continue in OP therapy utilizing the \"cope ahead or plan ahead\" method for common triggers     1 6: When experiencing a trigger, I will pause and identify if my immediate thought or judgement is helpful or hurtful  If it's hurtful begin to challenge with facts and logic  Date Initiated: 05/10/23  Revision Date: N/A  Target Date: 05/23/23  Completion Date: 05/12/23- to continue identifying cognitive distortions and challenging judgements        PSYCHIATRY:  Date Initiated:  05/01/23  Medication Management and Education       Revision Date: 05/10/23        1 3 Continue medication management   The person(s) responsible for carrying out the plan is Shaniqua Darby MD     NURSING/SYMPTOMOLOGY EDUCATION:   Date Initiated: 05/01/23  1 1,1 2,1 3,1 4 This therapist will provide wellness/symptoms and skill education groups three to five days weekly to educate Myrtis Alpers signs and symptoms of diagnoses, skills to manage, and medication questions that will be addressed by the treatment team     Revision Date: 05/10/23        1 1,1 2,1 3,1 4,1 5 Continue to encourage Agatha Okeefe to participate in wellness groups daily to learn about symptoms, coping strategies and warning signs to promote relapse prevention  The person(s) responsible for carrying out the plan is Rosenda Caraballo MS and Tahir Salcido     PSYCHOLOGY:   Date Initiated: 05/01/23       1 1, 1 2, 1 4 Provide psychotherapy group 5 times per week to allow opportunity for Mary Lofton explore stressors and ways of coping  Revision Date: 05/10/23   1 1,1 2,1 4,1 5  Continue to provide psychotherapy group daily to Agathajackie Castellanos and encourage sharing of stressors, skills and positive change     The person(s) responsible for carrying out the plan is Jacky Epstein       ALLIED THERAPY:   Date Initiated: 05/01/23  1 1,1 2 Engage Myrtis Alpers AT group 5 times per week to encourage development and use of wellness tools to decrease " symptoms and promote recovery through meaningful activity  Revision Date: 05/10/23   1 1,1 2,1 5 Continue to engage Iris Cabrera to participate in AT group to practice wellness tools within program and transfer to home sharing successes and barriers through healthy task involvement  The person(s) responsible for carrying out the plan is TERRY Vick and TERRY Olmedo     CASE MANAGEMENT:   Date Initiated: 05/01/23      1 0 This  will meet with Jody Rajput times weekly to assess treatment progress, discharge planning, connection to community supports and UR as indicated  Revision Date: 05/10/23   1 0 Continue to meet with Iris Cabrera 3-4 times weekly to assess growth, work toward goals, continued treatment needs, dc planning and use of supports     The person(s) responsible for carrying out the plan is TERRY Olmedo     TREATMENT REVIEW/COMMENTS:      DISCHARGE CRITERIA: Identify 3 signs of progress and complete relapse prevention plan     DISCHARGE PLAN: Connect with outpatient providers  Estimated Length of Stay: 10 treatment days

## 2023-05-15 NOTE — PSYCH
"Subjective:     Patient ID: Erick Ravi is a 36 y o  female  [facility] Clinical Progress Notes      Specialized Services Documentation  Therapist must complete separate progress note for each specific clinical activity in which the individual participated during the day  Group Psychotherapy  0930-1030 Group engaged in an interactive experience and discussed how insights from the interactions can apply outside of program  Facilitator presented \"What\" and \"How\" skills of mindfulness  Group was led to think and discuss how and when to use them in everyday life  Group also discussed [employer]ce [employer] Mind and tangible examples of what it looks like to operate exclusively from emotional or logical mind  Group discussed healthy and unhealthy ways to advocate for themselves and how to use \"What\" and \"How\" skills when doing so  Erick Ravi participated actively and was observed providing peer support throughout the session and actively contributed to discussion about \"what\" and \"how\" skills for mindfulness  Continue psychotherapy for understanding and application of \"What\" Skills, \"How\" Skills, and [employer]  Tx Objectives: 1 1, 1 2 , 1 4  Therapist: PEDRITO Lamb    Note Reviewed and Cosigned by TERRY Gao       Allied Therapy  [id number] Group first participated in music-making for grounding and mindfulness  Group members were then led in an activity focused on recognizing personal tendencies in group interaction  Group members were given cards, each containing one word to a song title, and were instructed to obtain cards that make up one song title  Multiple rounds occurred with different parameters regarding communication  Discussion followed each round detailing personal tendencies, interaction with others, and how these areas relate to mental health or the outside world  At the end of the session, group members listened to one of the songs from the game   Erick Ravi participated actively in " music-making, interactive experience, and discussion throughout the session  Continue AT for the development and practice of communication and navigating uncomfortable emotions  Tx Objectives: 1 1, 1 2, 1 4  Therapists: PEDRITO Moreland and Katie Rolon MT-BC      Education Therapy   [id number]  Joycelyn Wyman participated actively in shared in check in and goal review  Presented as receptive related to readiness to learn  Joycelyn Wyman  did complete goal from last treatment day identifying gaining responsibility  did not present with any barriers to learning  [id number]  Joycelyn Wyman engaged throughout the treatment day  Was engaged in learning related to Illness, Medication, Aftercare and Wellness Tools  Staff utilized Verbal, Written, A/V and Demonstration teaching methods   Joycelyn Wyman shared area of learning and set a goal for outside of program to attend a zoom meeting and take her daughter to meet friends in the hospital       Tx Plan Objective: 1 1, 1 2, 1 4, Therapist: PEDRITO Moreland

## 2023-05-15 NOTE — PSYCH
Subjective:    Patient ID: Rekha Soares is a 36 y o  female      [facility] Clinical Progress Notes      Specialized Services Documentation  Therapist must complete separate progress note for each specific clinical activity in which the individual participated during the day  Group Psychotherapy  [id number] Rekha Soares participated actively a psychotherapy group focused on finding hobbies and self-soothing  The group was supplied with: lined paper, a variety of journal prompts, art supplies, coloring sheets, card games, books on mindfulness and mental health, books of puzzles, crafting milton, origami supplies, a collaborative Lego set, poetry, information on embroidery, and blank paper to engage with as well  Participants were encouraged to engage in discussion with one another, ask questions, share materials, and try out different mediums/materials to find ones that suit them  Continue to note progress towards goals  Encouraged upon discharge to continue exploration of stress relief tools     Tx Plan Objective 1 1, 1 2, 1 4 Therapist: Rob Ackerman, [address] Co-Facilitator Ritchie Biggs RN

## 2023-07-02 ENCOUNTER — TELEPHONE (OUTPATIENT)
Dept: NEUROLOGY | Facility: CLINIC | Age: 43
End: 2023-07-02

## 2023-07-02 NOTE — TELEPHONE ENCOUNTER
[employer] PA is about to . Per enc 7/15/22-PA will  23    PA renewed on CMM.   (Key: [id number])      Awaiting determination

## 2023-07-07 NOTE — TELEPHONE ENCOUNTER
Per [facility]- [id number]. [employer] 120MG/ML is approved through 07/02/2024.      Approval letter printed in CV office to be scanned in pt's chart

## 2023-07-31 ENCOUNTER — OFFICE VISIT (OUTPATIENT)
Dept: URGENT CARE | Facility: CLINIC | Age: 43
End: 2023-07-31
Payer: OTHER MISCELLANEOUS

## 2023-07-31 DIAGNOSIS — H92.01 EAR PAIN, RIGHT: Primary | ICD-10-CM

## 2023-07-31 DIAGNOSIS — G43.011 INTRACTABLE MIGRAINE WITHOUT AURA AND WITH STATUS MIGRAINOSUS: ICD-10-CM

## 2023-07-31 PROCEDURE — 99213 OFFICE O/P EST LOW 20 MIN: CPT | Performed by: PHYSICIAN ASSISTANT

## 2023-08-01 ENCOUNTER — HOSPITAL ENCOUNTER (EMERGENCY)
Facility: HOSPITAL | Age: 43
Discharge: HOME | End: 2023-08-01
Attending: EMERGENCY MEDICINE
Payer: OTHER MISCELLANEOUS

## 2023-08-01 ENCOUNTER — APPOINTMENT (EMERGENCY)
Dept: CT IMAGING | Facility: HOSPITAL | Age: 43
End: 2023-08-01
Payer: OTHER MISCELLANEOUS

## 2023-08-01 VITALS
RESPIRATION RATE: 16 BRPM | SYSTOLIC BLOOD PRESSURE: 107 MMHG | OXYGEN SATURATION: 98 % | DIASTOLIC BLOOD PRESSURE: 65 MMHG | HEART RATE: 59 BPM | TEMPERATURE: 97.8 F

## 2023-08-01 DIAGNOSIS — G43.909 MIGRAINE: Primary | ICD-10-CM

## 2023-08-01 LAB
ALBUMIN SERPL BCP-MCNC: 4.1 G/DL (ref 3.5–5)
ALP SERPL-CCNC: 34 U/L (ref 34–104)
ALT SERPL W P-5'-P-CCNC: 20 U/L (ref 7–52)
ANION GAP SERPL CALCULATED.3IONS-SCNC: 4 MMOL/L
APTT PPP: 25 SECONDS (ref 23–37)
AST SERPL W P-5'-P-CCNC: 13 U/L (ref 13–39)
BASOPHILS # BLD AUTO: 0.07 THOUSANDS/ÂΜL (ref 0–0.1)
BASOPHILS NFR BLD AUTO: 1 % (ref 0–1)
BILIRUB SERPL-MCNC: 0.3 MG/DL (ref 0.2–1)
BUN SERPL-MCNC: 12 MG/DL (ref 5–25)
CALCIUM SERPL-MCNC: 8.7 MG/DL (ref 8.4–10.2)
CHLORIDE SERPL-SCNC: 107 MMOL/L (ref 96–108)
CO2 SERPL-SCNC: 27 MMOL/L (ref 21–32)
CREAT SERPL-MCNC: 0.68 MG/DL (ref 0.6–1.3)
EOSINOPHIL # BLD AUTO: 0.08 THOUSAND/ÂΜL (ref 0–0.61)
EOSINOPHIL NFR BLD AUTO: 1 % (ref 0–6)
ERYTHROCYTE [DISTWIDTH] IN BLOOD BY AUTOMATED COUNT: 12.4 % (ref 11.6–15.1)
EXT PREGNANCY TEST URINE: NEGATIVE
EXT. CONTROL: NORMAL
GFR SERPL CREATININE-BSD FRML MDRD: 107 ML/MIN/1.73SQ M
GLUCOSE SERPL-MCNC: 75 MG/DL (ref 65–140)
HCT VFR BLD AUTO: 42.1 % (ref 34.8–46.1)
HGB BLD-MCNC: 13.7 G/DL (ref 11.5–15.4)
IMM GRANULOCYTES # BLD AUTO: 0.02 THOUSAND/UL (ref 0–0.2)
IMM GRANULOCYTES NFR BLD AUTO: 0 % (ref 0–2)
INR PPP: 0.95 (ref 0.84–1.19)
LYMPHOCYTES # BLD AUTO: 2.47 THOUSANDS/ÂΜL (ref 0.6–4.47)
LYMPHOCYTES NFR BLD AUTO: 41 % (ref 14–44)
MCH RBC QN AUTO: 29.1 PG (ref 26.8–34.3)
MCHC RBC AUTO-ENTMCNC: 32.5 G/DL (ref 31.4–37.4)
MCV RBC AUTO: 89 FL (ref 82–98)
MONOCYTES # BLD AUTO: 0.56 THOUSAND/ÂΜL (ref 0.17–1.22)
MONOCYTES NFR BLD AUTO: 9 % (ref 4–12)
NEUTROPHILS # BLD AUTO: 2.87 THOUSANDS/ÂΜL (ref 1.85–7.62)
NEUTS SEG NFR BLD AUTO: 48 % (ref 43–75)
NRBC BLD AUTO-RTO: 0 /100 WBCS
PLATELET # BLD AUTO: 218 THOUSANDS/UL (ref 149–390)
PMV BLD AUTO: 10.8 FL (ref 8.9–12.7)
POTASSIUM SERPL-SCNC: 3.3 MMOL/L (ref 3.5–5.3)
PROT SERPL-MCNC: 6.4 G/DL (ref 6.4–8.4)
PROTHROMBIN TIME: 13.3 SECONDS (ref 11.6–14.5)
RBC # BLD AUTO: 4.71 MILLION/UL (ref 3.81–5.12)
SODIUM SERPL-SCNC: 138 MMOL/L (ref 135–147)
WBC # BLD AUTO: 6.07 THOUSAND/UL (ref 4.31–10.16)

## 2023-08-01 PROCEDURE — 36415 COLL VENOUS BLD VENIPUNCTURE: CPT | Performed by: EMERGENCY MEDICINE

## 2023-08-01 PROCEDURE — 99285 EMERGENCY DEPT VISIT HI MDM: CPT | Performed by: EMERGENCY MEDICINE

## 2023-08-01 PROCEDURE — 85025 COMPLETE CBC W/AUTO DIFF WBC: CPT | Performed by: EMERGENCY MEDICINE

## 2023-08-01 PROCEDURE — 70496 CT ANGIOGRAPHY HEAD: CPT

## 2023-08-01 PROCEDURE — 70498 CT ANGIOGRAPHY NECK: CPT

## 2023-08-01 PROCEDURE — 81025 URINE PREGNANCY TEST: CPT | Performed by: EMERGENCY MEDICINE

## 2023-08-01 PROCEDURE — 80053 COMPREHEN METABOLIC PANEL: CPT | Performed by: EMERGENCY MEDICINE

## 2023-08-01 PROCEDURE — 85610 PROTHROMBIN TIME: CPT | Performed by: EMERGENCY MEDICINE

## 2023-08-01 PROCEDURE — 85730 THROMBOPLASTIN TIME PARTIAL: CPT | Performed by: EMERGENCY MEDICINE

## 2023-08-01 RX ORDER — DIPHENHYDRAMINE HYDROCHLORIDE 50 MG/ML
25 INJECTION INTRAMUSCULAR; INTRAVENOUS ONCE
Status: COMPLETED | OUTPATIENT
Start: 2023-08-01 | End: 2023-08-01

## 2023-08-01 RX ORDER — MAGNESIUM SULFATE HEPTAHYDRATE 40 MG/ML
2 INJECTION, SOLUTION INTRAVENOUS ONCE
Status: DISCONTINUED | OUTPATIENT
Start: 2023-08-01 | End: 2023-08-01 | Stop reason: HOSPADM

## 2023-08-01 RX ORDER — MAGNESIUM SULFATE HEPTAHYDRATE 40 MG/ML
2 INJECTION, SOLUTION INTRAVENOUS ONCE
Status: COMPLETED | OUTPATIENT
Start: 2023-08-01 | End: 2023-08-01

## 2023-08-01 RX ORDER — METOCLOPRAMIDE HYDROCHLORIDE 5 MG/ML
10 INJECTION INTRAMUSCULAR; INTRAVENOUS ONCE
Status: COMPLETED | OUTPATIENT
Start: 2023-08-01 | End: 2023-08-01

## 2023-08-01 RX ORDER — KETOROLAC TROMETHAMINE 30 MG/ML
30 INJECTION, SOLUTION INTRAMUSCULAR; INTRAVENOUS ONCE
Status: COMPLETED | OUTPATIENT
Start: 2023-08-01 | End: 2023-08-01

## 2023-08-01 RX ORDER — DEXAMETHASONE SODIUM PHOSPHATE 10 MG/ML
10 INJECTION, SOLUTION INTRAMUSCULAR; INTRAVENOUS ONCE
Status: COMPLETED | OUTPATIENT
Start: 2023-08-01 | End: 2023-08-01

## 2023-08-01 RX ADMIN — MAGNESIUM SULFATE HEPTAHYDRATE 2 G: 2 INJECTION, SOLUTION INTRAVENOUS at 10:55

## 2023-08-01 RX ADMIN — METOCLOPRAMIDE 10 MG: 5 INJECTION, SOLUTION INTRAMUSCULAR; INTRAVENOUS at 08:30

## 2023-08-01 RX ADMIN — KETOROLAC TROMETHAMINE 30 MG: 30 INJECTION, SOLUTION INTRAMUSCULAR; INTRAVENOUS at 08:29

## 2023-08-01 RX ADMIN — DIPHENHYDRAMINE HYDROCHLORIDE 25 MG: 50 INJECTION, SOLUTION INTRAMUSCULAR; INTRAVENOUS at 08:30

## 2023-08-01 RX ADMIN — IOHEXOL 85 ML: 350 INJECTION, SOLUTION INTRAVENOUS at 10:25

## 2023-08-01 RX ADMIN — SODIUM CHLORIDE 1000 ML: 0.9 INJECTION, SOLUTION INTRAVENOUS at 08:30

## 2023-08-01 RX ADMIN — DEXAMETHASONE SODIUM PHOSPHATE 10 MG: 10 INJECTION, SOLUTION INTRAMUSCULAR; INTRAVENOUS at 10:54
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
## 2023-08-01 NOTE — Clinical Note
Lneora Huff was seen and treated in our emergency department on 8/1/2023. Diagnosis:     Altagracia Christopher  may return to work on return date. She may return on this date: 08/03/2023         If you have any questions or concerns, please don't hesitate to call.       Alma Parham, DO    ______________________________           _______________          _______________  Hospital Representative                              Date                                Time

## 2023-08-01 NOTE — ED NOTES
Patient reports slight improvement in headache, given fluids and crackers.       Pasqual Meckel, RN  08/01/23 0057

## 2023-09-21 ENCOUNTER — APPOINTMENT (OUTPATIENT)
Dept: LAB | Facility: CLINIC | Age: 43
End: 2023-09-21
Payer: COMMERCIAL

## 2023-09-21 DIAGNOSIS — R63.4 LOSS OF WEIGHT: ICD-10-CM

## 2023-09-21 DIAGNOSIS — Z11.3 SCREENING FOR STD (SEXUALLY TRANSMITTED DISEASE): ICD-10-CM

## 2023-09-21 LAB
BUN SERPL-MCNC: 11 MG/DL (ref 5–25)
CK SERPL-CCNC: 45 U/L (ref 26–192)
HBV SURFACE AG SER QL: NORMAL
HCV AB SER QL: NORMAL
HIV 1+2 AB+HIV1 P24 AG SERPL QL IA: NORMAL
HIV 2 AB SERPL QL IA: NORMAL
HIV1 AB SERPL QL IA: NORMAL
HIV1 P24 AG SERPL QL IA: NORMAL
TREPONEMA PALLIDUM IGG+IGM AB [PRESENCE] IN SERUM OR PLASMA BY IMMUNOASSAY: NORMAL

## 2023-09-21 PROCEDURE — 86780 TREPONEMA PALLIDUM: CPT

## 2023-09-21 PROCEDURE — 36415 COLL VENOUS BLD VENIPUNCTURE: CPT

## 2023-09-21 PROCEDURE — 84520 ASSAY OF UREA NITROGEN: CPT

## 2023-09-21 PROCEDURE — 87340 HEPATITIS B SURFACE AG IA: CPT

## 2023-09-21 PROCEDURE — 87389 HIV-1 AG W/HIV-1&-2 AB AG IA: CPT

## 2023-09-21 PROCEDURE — 86803 HEPATITIS C AB TEST: CPT

## 2023-11-13 NOTE — H&P (VIEW-ONLY)
Assessment/Plan:  Left vocal fold polyp. Speech Therapy prescribed. Pt scheduled for MCL and excision of left vocal fold polyp. Diagnosis ICD-10-CM Associated Orders   1. Dysphonia  R49.0       2. 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 female. Pt with c/o raspy voice x 1 1/2 yrs. She was also Dx'd at the time with gastroparesis and OSORIO. Her voice is raspy, often strained, and sometimes she has no voice at all. She was diagnosed by a doctor at [facility] with vocal cord nodules and polyps. The following portions of the patient's history were reviewed and updated as appropriate: allergies, current medications, past family history, past medical history, past social history, past surgical history and problem list.    Review of Systems      Objective: Wt 76.9 kg (169 lb 9.6 oz)   BMI 28.66 kg/m²          Physical Exam  Constitutional:       Appearance: She is well-developed. HENT:      Head: Normocephalic and atraumatic. Right Ear: Tympanic membrane, ear canal and external ear normal. No drainage. No middle ear effusion. Left Ear: Tympanic membrane, ear canal and external ear normal. No drainage. No middle ear effusion. Nose: Nose normal.      Mouth/Throat:      Pharynx: Uvula midline. No oropharyngeal exudate. Tonsils: 2+ on the right. 2+ on the left. Neck:      Thyroid: No thyroid mass or thyromegaly. Trachea: Trachea normal. No tracheal deviation. Cardiovascular:      Heart sounds: Normal heart sounds, S1 normal and S2 normal.   Pulmonary:      Breath sounds: Normal breath sounds and air entry. Abdominal:      General: Bowel sounds are normal.      Palpations: Abdomen is soft. Lymphadenopathy:      Cervical: No cervical adenopathy. Neurological:      Mental Status: She is alert.          Procedure: Strobovideolaryngoscopy (74400)  Dynamic Voice Analysis (42852)    Pre-Operative Diagnosis:  Dysphonia Post-Operative Diagnosis:    Dysphonia   Surgeon: Meghan Rosen MD  Anesthesia: lidocaine 4% + oxymetazoline     Stroboscope: EndoDigi  Flexible nasolaryngocsope; distal chip  Procedure Details: The procedure was performed in the endoscopy special procedures room. A high resolution video laryngoscope was inserted transnasally / transorally and monitored on the video system for magnification and documentation. Stroboscopic light at several frequencies and intensities was used. Videostrobolaryngoscopic findings are deferred to the following report because the separate procedure with rigid endoscopy produces much higher resolution and allows diagnosis of lesions not visible during flexible laryngoscopy and dynamic voice analysis. Voice:      Supraglottic Hyperfunction: mild  Arytenoid Joint Movement: nl  Dysdiadochokinesis: Absent  Arytenoids: nl  Posterior Cobblestoning: none  Right Vocal Fold:  Abduction:    normal  Adduction:    normal  Longitudinal Tension:   normal  Left Vocal Fold:  Abduction:    normal  Adduction:    normal  Longitudinal Tension:   normal    Videostrobolaryngoscopy with Magnification  Amplitude Symmetry:   Symmetric  Phase Symmetry:    Symmetric  Periodicity:    Regular  Glottic Closure:    incomplete; due to interference from polyp  Vocal Process Height: normal  Post-Cricoid Edema: mild  Post-Cricoid Pachydermia: none  Amplitude of Vocal Folds:  Right: normal  Left: decreased  Wave Form of Vocal Folds:  Right: normal  Left: decreased  Vibratory Function of Vocal Folds:  Right: normal  Left: decreased  Vocal Fold Color:  Right: normal  Left: normal  Masses/Vibratory Margin Irregularities: polyp on free edge on left. Other Lesions: none  The procedure was concluded without complication and the laryngoscope was removed.

## 2023-11-22 ENCOUNTER — ANESTHESIA EVENT (OUTPATIENT)
Dept: PERIOP | Facility: HOSPITAL | Age: 43
End: 2023-11-22
Payer: COMMERCIAL

## 2023-11-22 RX ORDER — CHLORAL HYDRATE 500 MG
1000 CAPSULE ORAL DAILY
COMMUNITY

## 2023-11-22 NOTE — PRE-PROCEDURE INSTRUCTIONS
Pre-Surgery Instructions:   Medication Instructions    ALPRAZolam (XANAX) 1 mg tablet Uses PRN- OK to take day of surgery    amphetamine-dextroamphetamine (ADDERALL XR) 20 MG 24 hr capsule Hold day of surgery. Cholecalciferol (VITAMIN D3) 2000 units TABS Hold day of surgery. desvenlafaxine succinate (PRISTIQ) 50 mg 24 hr tablet Take day of surgery. dexamethasone (DECADRON) 2 mg tablet Uses PRN- OK to take day of surgery    fluticasone (FLONASE) 50 mcg/act nasal spray Uses PRN- OK to take day of surgery    Iron-Vitamin C  MG TABS Hold day of surgery. Linzess 290 MCG CAPS Hold day of surgery. Magnesium Oxide -Mg Supplement 400 MG CAPS Hold day of surgery. Melatonin 10 MG TABS Stop taking 7 days prior to surgery. metoclopramide (Reglan) 10 mg tablet Uses PRN- OK to take day of surgery    metoclopramide (REGLAN) 5 mg tablet Uses PRN- OK to take day of surgery    Motegrity 2 MG TABS Hold day of surgery. Omega-3 Fatty Acids (fish oil) 1,000 mg Stop taking 7 days prior to surgery. omeprazole (PriLOSEC) 20 mg delayed release capsule Uses PRN- OK to take day of surgery    ondansetron (ZOFRAN-ODT) 8 mg disintegrating tablet Hold day of surgery. Riboflavin (B2 PO) Hold day of surgery. rizatriptan (MAXALT) 10 mg tablet Uses PRN- OK to take day of surgery    traZODone (DESYREL) 50 mg tablet Take night before surgery   Medication instructions for day surgery reviewed. Please use only a sip of water to take your instructed medications. Avoid all over the counter vitamins, supplements and NSAIDS for one week prior to surgery per anesthesia guidelines. Tylenol is ok to take as needed. You will receive a call one business day prior to surgery with an arrival time and hospital directions. If your surgery is scheduled on a Monday, the hospital will be calling you on the Friday prior to your surgery.  If you have not heard from anyone by 8pm, please call the hospital supervisor through the hospital  at [phone]. Jimbo Ricketts [phone]). Do not eat or drink anything after midnight the night before your surgery, including candy, mints, lifesavers, or chewing gum. Do not drink alcohol 24hrs before your surgery. Try not to smoke at least 24hrs before your surgery. Follow the pre surgery showering instructions as listed in the [facility] Surgical Experience Booklet” or otherwise provided by your surgeon's office. Do not use a blade to shave the surgical area 1 week before surgery. It is okay to use a clean electric clippers up to 24 hours before surgery. Do not apply any lotions, creams, including makeup, cologne, deodorant, or perfumes after showering on the day of your surgery. Do not use dry shampoo, hair spray, hair gel, or any type of hair products. No contact lenses, eye make-up, or artificial eyelashes. Remove nail polish, including gel polish, and any artificial, gel, or acrylic nails if possible. Remove all jewelry including rings and body piercing jewelry. Wear causal clothing that is easy to take on and off. Consider your type of surgery. Keep any valuables, jewelry, piercings at home. Please bring any specially ordered equipment (sling, braces) if indicated. Arrange for a responsible person to drive you to and from the hospital on the day of your surgery. Visitor Guidelines discussed. Call the surgeon's office with any new illnesses, exposures, or additional questions prior to surgery. Please reference your [facility] Surgical Experience Booklet” for additional information to prepare for your upcoming surgery.

## 2023-11-27 ENCOUNTER — EVALUATION (OUTPATIENT)
Dept: SPEECH THERAPY | Facility: CLINIC | Age: 43
End: 2023-11-27
Payer: COMMERCIAL

## 2023-11-27 DIAGNOSIS — J38.1 VOCAL FOLD POLYP: ICD-10-CM

## 2023-11-27 DIAGNOSIS — R49.0 DYSPHONIA: ICD-10-CM

## 2023-11-27 DIAGNOSIS — J38.2 VOCAL NODULES IN ADULTS: Primary | ICD-10-CM

## 2023-11-27 PROCEDURE — 92524 BEHAVRAL QUALIT ANALYS VOICE: CPT

## 2023-11-27 NOTE — PROGRESS NOTES
Speech-Language Pathology Initial Evaluation    Today's date: 2023   Patient’s name: Juani Hernández  : 1980  MRN: [id number]  Safety measures:   Referring provider: Desire Monteiro MD    Encounter Diagnosis     ICD-10-CM    1. Vocal nodules in adults  J38.2       2. Dysphonia  R49.0 Ambulatory referral to Speech Therapy      3. 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 -RE due: 2023    Subjective comments: Patient noted she was feeling anxious at times but cooperative with evaluation and provided thorough information. Why did the patient choose us? Physician    Patient's goal(s): To improve vocal quality and return to work. Reason for referral: Change in vocal quality  Prior functional status: N/A  Clinically complex situations: N/A    History: Patient is a 36 y.o. female who was referred to outpatient skilled Speech Therapy services for a voice evaluation. Patient with last follow up by ENT 23 indicating left vocal cord polyp. Previously noted year prior with nodules by ENT. Patient is scheduled to have surgery to remove polyp this Wednesday.     Hearing: WFL  Vision: WFL    Home environment/lifestyle: lives with  and daughter  Highest level of education: N/A  Vocational status: N/A    Mental status: Alert  Behavior status: Cooperative  Patient reported symptoms of: depression, anxiety, and frustration  Communication modalities: Verbal  Rehabilitation prognosis: Good rehab potential to reach the established goals    Assessments    VOICE EVALUATION:    Interview:   -Chief complaint: hoarse/weak voice  -Onset: Sudden (beginning: Dec. 2022)    -Water intake: good  -Caffeine intake: N/A  -Alcohol intake: N/A  -Smoking history: N/A  -Laryngeal demand (work, home, socially): normal  -Throat clearing: present  -Coughing: n/a  -Lozenges (mentholated?): n/a  -Exposure to environmental triggers (e.g., temperature changes, smoke, chemicals, allergens): n/a  -History: Reflux , Gastroparesis  -Dyspnea: yes  -Dysphagia: -  -Allergy testing: DNT  -Nasal symptoms: -  -GERD/LPR symptoms: + Gerd  -Recurrent URI: n/a  -Previous voice therapy: n/a      Subjective Observations:    -The Reflux Symptom Index (RSI) is a self-administered, 9-item outcomes instrument to quantify symptoms in patients with laryngopharyngeal reflux (LRP). An RSI score of <=13 can be considered normal. Some degree of reflux is present in normal individuals. A score of >13 is suggestive of significant LPR symptoms. Patient obtained a score of TBD/45. (see scanned document)    -The Dyspnea Index (DI) is a self-administered, 10-item outcomes instrument to quantify severity of symptoms in upper airway dyspnea. Preliminary data suggest that a score of <7 can be considered normal and a >10 is suggestive of PVFM. A change score of 8 has been shown to be meaningful. Patient obtained a score of TBD/40. (see scanned document)        Objective Measurements/Voice Parameters:  RESPIRATORY EFFICIENCY:   -S:Z Ratio Task: Patient was instructed to sustain the sounds /s/ and /z/ to examine the coordination and efficiency of respiration and voice production. Normative data suggests that adults can prolong these sounds for 20-25 seconds. Ratios of 1.4 and above are consistent with laryngeal inefficiency, and ratios of 2.0 and above are suggestive of vocal fold pathology. Patient's S:Z ratio was 2.14 (15 sec : 7 sec). -Maximum Phonation Time: Patient was instructed to sustain /a/ to measure respiratory and laryngeal coordination and efficiency. Adults are typically able to prolong vowels sound for 15-20 seconds. Reduced MPT may suggest poor respiratory support, or poor medial glottal closure.  Patient's MPT was 7 seconds, pitch breaks          Patient was educated on various vocal abuse behaviors and vocal hygiene throughout assessment. Vocal abuses included decreased water and increased caffeine intake, coughing and throat-clearing, thoracic/clavicular breathing, straining voice, whispering. Vocal hygiene strategies included increased water intake, decreased caffeine intake, throat-clearing awareness, increased breath support/diaphragmatic breathing, compensatory strategies to minimize vocal abuses during work day, confidential voice. Patient was agreeable. Goals    Short Term     Patient will be educated on vocal hygiene and demonstrate understanding of recommendations to facilitate increased quality of voice. Patient will improve s/z ratio to 1:1. Pt will utilize diaphragmatic breathing during oral sentence/paragraph reading in 80% of opportunities to facilitate increased breath support for voice/speaking. Patient will be educated on diaphragmatic breathing (versus clavicular breathing) to establish controlled, rhythmic breathing. Patient will increase control of his/her symptoms through the use of relaxation and compensatory strategies. Vocal quality during 1:1 conversation will improve over 4 weeks using the trained compensatory strategies. Patient will be educated on the use and implementation of Resonant Voice. Patient will use and implement Resonant Voice during 80% of spontaneous speech as judged by patient and clinician. Patient will demonstrate appropriate loudness in speech tasks with designated % accuracy. Patient will complete vocal function exercises to increase vocal fold efficiency and endurance. Patient will demonstrate the use of relaxation exercises to decrease upper body tension contributing to dysphonia. Patient will utilize semi-occluded vocal tract exercises without laryngeal tension to promote healthy vocal function with 80% accuracy.         Patient will be educated on techniques to decrease vocal abuses of chronic throat clearing and utilize appropriately. Patient will be educated on and produce easy onset of words-conversation for optimal vocal quality. Long Term    Patient will improve vocal quality for increased functional communication by discharge. Patient will exhibit understanding of recommendations for optimal vocal quality and implement easy onset & vocal hygiene techniques with at least 80% independence. Impressions/Recommendations    Impressions:   -Patient is a 36year old female with vocal polyp (previously also diagnosed with nodules also). Patient with hoarse and weak vocal quality. Recommend: voice therapy following surgery for education and vocal training to elicit gentle voicing and proper vocal hygiene.     Recommendations:  -Patient would benefit from outpatient skilled Speech Therapy services: Voice therapy    -Frequency: 1-2x weekly  -Duration: 4-6 weeks    -Intervention certification from: 19/78/2368  -Intervention certification to: 2//0/8340    -Intervention comments:   Voice packet, stretches, bubbling, easy onset, resonance, breath support

## 2023-11-29 ENCOUNTER — ANESTHESIA (OUTPATIENT)
Dept: PERIOP | Facility: HOSPITAL | Age: 43
End: 2023-11-29
Payer: COMMERCIAL

## 2023-11-29 ENCOUNTER — HOSPITAL ENCOUNTER (OUTPATIENT)
Facility: HOSPITAL | Age: 43
Setting detail: OUTPATIENT SURGERY
Discharge: HOME | End: 2023-11-29
Attending: OTOLARYNGOLOGY | Admitting: OTOLARYNGOLOGY
Payer: COMMERCIAL

## 2023-11-29 VITALS
OXYGEN SATURATION: 98 % | TEMPERATURE: 97.5 F | DIASTOLIC BLOOD PRESSURE: 68 MMHG | WEIGHT: 160.2 LBS | RESPIRATION RATE: 16 BRPM | BODY MASS INDEX: 27.07 KG/M2 | HEART RATE: 66 BPM | SYSTOLIC BLOOD PRESSURE: 107 MMHG

## 2023-11-29 DIAGNOSIS — J38.1 VOCAL FOLD POLYP: ICD-10-CM

## 2023-11-29 LAB
EXT PREGNANCY TEST URINE: NEGATIVE
EXT. CONTROL: NORMAL

## 2023-11-29 PROCEDURE — 31541 LARYNSCOP W/TUMR EXC + SCOPE: CPT | Performed by: OTOLARYNGOLOGY

## 2023-11-29 PROCEDURE — 81025 URINE PREGNANCY TEST: CPT | Performed by: OTOLARYNGOLOGY

## 2023-11-29 PROCEDURE — 88305 TISSUE EXAM BY PATHOLOGIST: CPT | Performed by: STUDENT IN AN ORGANIZED HEALTH CARE EDUCATION/TRAINING PROGRAM

## 2023-11-29 RX ORDER — FENTANYL CITRATE/PF 50 MCG/ML
25 SYRINGE (ML) INJECTION
Status: DISCONTINUED | OUTPATIENT
Start: 2023-11-29 | End: 2023-11-29 | Stop reason: HOSPADM

## 2023-11-29 RX ORDER — GINSENG 100 MG
CAPSULE ORAL AS NEEDED
Status: DISCONTINUED | OUTPATIENT
Start: 2023-11-29 | End: 2023-11-29 | Stop reason: HOSPADM

## 2023-11-29 RX ORDER — ONDANSETRON 2 MG/ML
4 INJECTION INTRAMUSCULAR; INTRAVENOUS ONCE AS NEEDED
Status: COMPLETED | OUTPATIENT
Start: 2023-11-29 | End: 2023-11-29

## 2023-11-29 RX ORDER — MIDAZOLAM HYDROCHLORIDE 2 MG/2ML
INJECTION, SOLUTION INTRAMUSCULAR; INTRAVENOUS AS NEEDED
Status: DISCONTINUED | OUTPATIENT
Start: 2023-11-29 | End: 2023-11-29

## 2023-11-29 RX ORDER — PROMETHAZINE HYDROCHLORIDE 6.25 MG/5ML
12.5 SYRUP ORAL EVERY 6 HOURS PRN
Status: DISCONTINUED | OUTPATIENT
Start: 2023-11-29 | End: 2023-11-29

## 2023-11-29 RX ORDER — DEXAMETHASONE SODIUM PHOSPHATE 10 MG/ML
INJECTION, SOLUTION INTRAMUSCULAR; INTRAVENOUS AS NEEDED
Status: DISCONTINUED | OUTPATIENT
Start: 2023-11-29 | End: 2023-11-29

## 2023-11-29 RX ORDER — ROCURONIUM BROMIDE 10 MG/ML
INJECTION, SOLUTION INTRAVENOUS AS NEEDED
Status: DISCONTINUED | OUTPATIENT
Start: 2023-11-29 | End: 2023-11-29

## 2023-11-29 RX ORDER — HYDROMORPHONE HCL/PF 1 MG/ML
0.25 SYRINGE (ML) INJECTION
Status: DISCONTINUED | OUTPATIENT
Start: 2023-11-29 | End: 2023-11-29 | Stop reason: HOSPADM

## 2023-11-29 RX ORDER — ACETAMINOPHEN 325 MG/1
325 TABLET ORAL EVERY 4 HOURS PRN
Status: DISCONTINUED | OUTPATIENT
Start: 2023-11-29 | End: 2023-11-29 | Stop reason: HOSPADM

## 2023-11-29 RX ORDER — FENTANYL CITRATE 50 UG/ML
INJECTION, SOLUTION INTRAMUSCULAR; INTRAVENOUS AS NEEDED
Status: DISCONTINUED | OUTPATIENT
Start: 2023-11-29 | End: 2023-11-29

## 2023-11-29 RX ORDER — PROPOFOL 10 MG/ML
INJECTION, EMULSION INTRAVENOUS AS NEEDED
Status: DISCONTINUED | OUTPATIENT
Start: 2023-11-29 | End: 2023-11-29

## 2023-11-29 RX ORDER — SODIUM CHLORIDE, SODIUM LACTATE, POTASSIUM CHLORIDE, CALCIUM CHLORIDE 600; 310; 30; 20 MG/100ML; MG/100ML; MG/100ML; MG/100ML
INJECTION, SOLUTION INTRAVENOUS CONTINUOUS PRN
Status: DISCONTINUED | OUTPATIENT
Start: 2023-11-29 | End: 2023-11-29

## 2023-11-29 RX ORDER — MEPERIDINE HYDROCHLORIDE 25 MG/ML
12.5 INJECTION INTRAMUSCULAR; INTRAVENOUS; SUBCUTANEOUS ONCE
Status: DISCONTINUED | OUTPATIENT
Start: 2023-11-29 | End: 2023-11-29 | Stop reason: HOSPADM

## 2023-11-29 RX ORDER — PROMETHAZINE HYDROCHLORIDE 25 MG/ML
12.5 INJECTION, SOLUTION INTRAMUSCULAR; INTRAVENOUS EVERY 6 HOURS PRN
Status: DISCONTINUED | OUTPATIENT
Start: 2023-11-29 | End: 2023-11-29 | Stop reason: HOSPADM

## 2023-11-29 RX ORDER — GLYCOPYRROLATE 0.2 MG/ML
INJECTION INTRAMUSCULAR; INTRAVENOUS AS NEEDED
Status: DISCONTINUED | OUTPATIENT
Start: 2023-11-29 | End: 2023-11-29

## 2023-11-29 RX ORDER — ONDANSETRON 2 MG/ML
INJECTION INTRAMUSCULAR; INTRAVENOUS AS NEEDED
Status: DISCONTINUED | OUTPATIENT
Start: 2023-11-29 | End: 2023-11-29

## 2023-11-29 RX ORDER — MAGNESIUM HYDROXIDE 1200 MG/15ML
LIQUID ORAL AS NEEDED
Status: DISCONTINUED | OUTPATIENT
Start: 2023-11-29 | End: 2023-11-29 | Stop reason: HOSPADM

## 2023-11-29 RX ORDER — LIDOCAINE HYDROCHLORIDE 20 MG/ML
INJECTION, SOLUTION EPIDURAL; INFILTRATION; INTRACAUDAL; PERINEURAL AS NEEDED
Status: DISCONTINUED | OUTPATIENT
Start: 2023-11-29 | End: 2023-11-29

## 2023-11-29 RX ORDER — EPINEPHRINE 1 MG/ML
INJECTION, SOLUTION, CONCENTRATE INTRAVENOUS AS NEEDED
Status: DISCONTINUED | OUTPATIENT
Start: 2023-11-29 | End: 2023-11-29 | Stop reason: HOSPADM

## 2023-11-29 RX ORDER — EPINEPHRINE NASAL SOLUTION 1 MG/ML
SOLUTION NASAL AS NEEDED
Status: DISCONTINUED | OUTPATIENT
Start: 2023-11-29 | End: 2023-11-29 | Stop reason: HOSPADM

## 2023-11-29 RX ADMIN — REMIFENTANIL HYDROCHLORIDE 0.15 MCG/KG/MIN: 1 INJECTION, POWDER, LYOPHILIZED, FOR SOLUTION INTRAVENOUS at 11:30

## 2023-11-29 RX ADMIN — ACETAMINOPHEN 325 MG: 325 TABLET, FILM COATED ORAL at 13:20

## 2023-11-29 RX ADMIN — GLYCOPYRROLATE 0.2 MG: 0.2 INJECTION INTRAMUSCULAR; INTRAVENOUS at 11:30

## 2023-11-29 RX ADMIN — ONDANSETRON 4 MG: 2 INJECTION INTRAMUSCULAR; INTRAVENOUS at 11:30

## 2023-11-29 RX ADMIN — FENTANYL CITRATE 25 MCG: 50 INJECTION INTRAMUSCULAR; INTRAVENOUS at 12:28

## 2023-11-29 RX ADMIN — MIDAZOLAM 2 MG: 1 INJECTION INTRAMUSCULAR; INTRAVENOUS at 11:22

## 2023-11-29 RX ADMIN — SODIUM CHLORIDE, SODIUM LACTATE, POTASSIUM CHLORIDE, AND CALCIUM CHLORIDE: .6; .31; .03; .02 INJECTION, SOLUTION INTRAVENOUS at 11:50

## 2023-11-29 RX ADMIN — ROCURONIUM BROMIDE 50 MG: 10 INJECTION, SOLUTION INTRAVENOUS at 11:30

## 2023-11-29 RX ADMIN — DEXAMETHASONE SODIUM PHOSPHATE 10 MG: 10 INJECTION, SOLUTION INTRAMUSCULAR; INTRAVENOUS at 11:30

## 2023-11-29 RX ADMIN — MIDAZOLAM 2 MG: 1 INJECTION INTRAMUSCULAR; INTRAVENOUS at 11:15

## 2023-11-29 RX ADMIN — SODIUM CHLORIDE, SODIUM LACTATE, POTASSIUM CHLORIDE, AND CALCIUM CHLORIDE: .6; .31; .03; .02 INJECTION, SOLUTION INTRAVENOUS at 11:19

## 2023-11-29 RX ADMIN — FENTANYL CITRATE 100 MCG: 50 INJECTION INTRAMUSCULAR; INTRAVENOUS at 11:30

## 2023-11-29 RX ADMIN — PROMETHAZINE HYDROCHLORIDE 12.5 MG: 25 INJECTION, SOLUTION INTRAMUSCULAR; INTRAVENOUS at 14:28

## 2023-11-29 RX ADMIN — PROPOFOL 150 MG: 10 INJECTION, EMULSION INTRAVENOUS at 11:30

## 2023-11-29 RX ADMIN — ONDANSETRON 4 MG: 2 INJECTION INTRAMUSCULAR; INTRAVENOUS at 12:22

## 2023-11-29 RX ADMIN — LIDOCAINE HYDROCHLORIDE 100 MG: 20 INJECTION, SOLUTION EPIDURAL; INFILTRATION; INTRACAUDAL; PERINEURAL at 11:30

## 2023-11-29 RX ADMIN — FENTANYL CITRATE 25 MCG: 50 INJECTION INTRAMUSCULAR; INTRAVENOUS at 12:34

## 2023-11-29 RX ADMIN — SUGAMMADEX 200 MG: 100 INJECTION, SOLUTION INTRAVENOUS at 11:49

## 2023-11-29 NOTE — ANESTHESIA PROCEDURE NOTES
Anesthesia Notable Event    Date/Time: 11/29/2023 1:12 PM    Performed by: Bimal Avalos MD  Authorized by: Bimal Avalos MD

## 2023-11-29 NOTE — ANESTHESIA PREPROCEDURE EVALUATION
Procedure:  MICROLARYNGOSCOPY AND EXCISION (Left: Throat)    Relevant Problems   CARDIO   (+) Chronic migraine without aura without status migrainosus, not intractable      NEURO/PSYCH   (+) Anxiety and depression   (+) Chronic daily headache   (+) Chronic migraine without aura without status migrainosus, not intractable   (+) Generalized anxiety disorder   (+) Moderate episode of recurrent major depressive disorder (HCC)   (+) Post traumatic stress disorder (PTSD)      PULMONARY   (+) MONICA (obstructive sleep apnea)        Physical Exam    Airway    Mallampati score: I  TM Distance: >3 FB  Neck ROM: full     Dental   No notable dental hx     Cardiovascular  Rhythm: regular, Rate: normal    Pulmonary   Breath sounds clear to auscultation    Other Findings  post-pubertal.      Anesthesia Plan  ASA Score- 2     Anesthesia Type- general with ASA Monitors. Additional Monitors:     Airway Plan: ETT. Plan Factors-Exercise tolerance (METS): >4 METS. Chart reviewed. EKG reviewed. Existing labs reviewed. Patient summary reviewed. Patient is a current smoker. Patient instructed to abstain from smoking on day of procedure. Patient did not smoke on day of surgery. Obstructive sleep apnea risk education given perioperatively. Induction- intravenous. Postoperative Plan- Plan for postoperative opioid use. Informed Consent- Anesthetic plan and risks discussed with patient. I personally reviewed this patient with the CRNA. Discussed and agreed on the Anesthesia Plan with the CRNA. Lisa Doss

## 2023-11-29 NOTE — OP NOTE
OPERATIVE REPORT  PATIENT NAME: Cheo Daniel    :  1980  MRN: [id number]  Pt Location: [facility] 03    SURGERY DATE: 2023    Surgeon(s) and Role:     * Gaby Worley MD - Primary     * Leonid Kaiser MD - Assisting    Preop Diagnosis:  Vocal fold polyp [J38.1]    Post-Op Diagnosis Codes:     * Vocal fold polyp [J38.1]    Procedure(s):  Left - MICROLARYNGOSCOPY AND EXCISION    Specimen(s):  ID Type Source Tests Collected by Time Destination   1 : left vocal fold polyp Tissue Soft Tissue, Other TISSUE EXAM Gaby Worley MD 2023 1140        Estimated Blood Loss:   Minimal    Drains:  * No LDAs found *    Anesthesia Type:   General    Operative Indications:  Vocal fold polyp [J38.1]  dysphonia    Operative Findings:  Sessile polyp, left vocal fold, mid-cord, free edge    Complications:   None    Procedure and Technique:  After induction of general endotracheal anesthesia with a # 5 MLT endotracheal tube, the patient was draped in the usual sterile fashion. A tooth guard was placed on the upper teeth. The Sataloff medium female direct laryngoscope was inserted, and placed in the suspension apparatus. Tape was used for anterior laryngeal traction. The operating microscope was positioned to provide a magnified view of the larynx. The left vocal fold contained a sessile-based polyp along the free edge, at about the mid-cord. The area was injected with epinephrine 1:1,000. Micro scissors and micro graspers were used to excise the lesion, with minimal loss of mucosa. Bleeding was controlled with an adrenaline pledget. At the end of the procedure, the patient was released from suspension, and the laryngoscope, tape, and tooth guard were removed. The teeth and pharynx were examined for any injuries, and no injuries were seen. When the patient awoke from general anesthesia, the patient was extubated and transported to the recovery room in satisfactory condition.      I was present for the entire procedure.     Patient Disposition:  PACU         SIGNATURE: Marge Prasad MD  DATE: November 29, 2023  TIME: 11:53 AM

## 2023-11-29 NOTE — INTERVAL H&P NOTE
H&P reviewed. After examining the patient I find no changes in the patients condition since the H&P had been written.     Vitals:    11/29/23 0854   BP: 111/68   Pulse: 58   Resp: 20   Temp: 97.8 °F (36.6 °C)   SpO2: 100%

## 2023-11-29 NOTE — ANESTHESIA POSTPROCEDURE EVALUATION
Post-Op Assessment Note    CV Status:  Stable  Pain Score: 0    Pain management: adequate       Mental Status:  Arousable and sleepy   Hydration Status:  Stable   PONV Controlled:  Controlled   Airway Patency:  Patent     Post Op Vitals Reviewed: Yes    No anethesia notable event occurred.     Staff: CRNA               BP      Temp      Pulse     Resp      SpO2

## 2023-12-04 PROCEDURE — 88305 TISSUE EXAM BY PATHOLOGIST: CPT | Performed by: STUDENT IN AN ORGANIZED HEALTH CARE EDUCATION/TRAINING PROGRAM

## 2024-02-03 ENCOUNTER — OFFICE VISIT (OUTPATIENT)
Dept: URGENT CARE | Facility: CLINIC | Age: 44
End: 2024-02-03
Payer: COMMERCIAL

## 2024-02-03 VITALS
DIASTOLIC BLOOD PRESSURE: 70 MMHG | HEART RATE: 88 BPM | RESPIRATION RATE: 18 BRPM | BODY MASS INDEX: 28.35 KG/M2 | SYSTOLIC BLOOD PRESSURE: 100 MMHG | WEIGHT: 160 LBS | TEMPERATURE: 98.8 F | OXYGEN SATURATION: 99 % | HEIGHT: 63 IN

## 2024-02-03 DIAGNOSIS — L03.818 CELLULITIS OF OTHER SPECIFIED SITE: Primary | ICD-10-CM

## 2024-02-03 PROBLEM — L03.90 CELLULITIS: Status: ACTIVE | Noted: 2024-02-03

## 2024-02-03 PROCEDURE — 99213 OFFICE O/P EST LOW 20 MIN: CPT

## 2024-02-03 RX ORDER — CEPHALEXIN 500 MG/1
500 CAPSULE ORAL EVERY 6 HOURS SCHEDULED
Qty: 28 CAPSULE | Refills: 0 | Status: SHIPPED | OUTPATIENT
Start: 2024-02-03 | End: 2024-02-10

## 2024-02-03 NOTE — PROGRESS NOTES
[facility]        NAME: Neyda Brooks is a 43 y.o. female  : 1980    MRN: [id number]  DATE: February 3, 2024  TIME: 9:49 AM    Assessment and Plan   Cellulitis of other specified site [L03.818]  1. Cellulitis of other specified site  cephalexin (KEFLEX) 500 mg capsule            Patient Instructions   Take the antibiotic as prescribed  Warm soaks to the right ear   Do not put any ear rings back in that ear  With your PCP  You can continue all the other medications that you are taking for your COVID symptoms  Follow up with PCP in 3-5 days.  Proceed to  ER if symptoms worsen.    Chief Complaint     Chief Complaint   Patient presents with    Cold Like Symptoms     Test home covid test last Friday, currently has right sided pressure/ ear pain. Has lump as well. Nausea denies fever         History of Present Illness       This is a 43-year-old female who presents today with pain in her right ear.  She states she tested positive for COVID 8 days ago she has been having head congestion sore throat and left-sided facial pain nose ears throat states now it is moved to her right side she is having a lot of pain in her right ear pain on the right side of her neck and throat she has had a headache she is taken Phenergan naproxen and Tylenol and swimmer's ear drops.  She states that she has a swollen nodule under her ear.  She is very anxious he r son  of cancer and she states she is afraid that the lump is cancer. The R outer ear is swollen and red        Review of Systems   Review of Systems   Constitutional: Negative.    HENT:  Positive for congestion, rhinorrhea and sore throat.    Eyes: Negative.    Respiratory:  Positive for cough. Negative for shortness of breath.    Cardiovascular:  Negative for chest pain.   Genitourinary: Negative.    Skin:  Positive for wound.   Neurological:  Positive for headaches.         Current Medications       Current Outpatient Medications:     ALPRAZolam (XANAX) 1 mg  tablet, Take 1 mg by mouth 3 (three) times a day, Disp: , Rfl:     amphetamine-dextroamphetamine (ADDERALL XR) 20 MG 24 hr capsule, Take 20 mg by mouth every morning, Disp: , Rfl:     cephalexin (KEFLEX) 500 mg capsule, Take 1 capsule (500 mg total) by mouth every 6 (six) hours for 7 days, Disp: 28 capsule, Rfl: 0    Cholecalciferol (VITAMIN D3) 2000 units TABS, Take 2,000 Units by mouth in the morning, Disp: , Rfl:     desvenlafaxine succinate (PRISTIQ) 50 mg 24 hr tablet, Take 1 tablet (50 mg total) by mouth daily, Disp: 30 tablet, Rfl: 0    dexamethasone (DECADRON) 2 mg tablet, Decadron 2 mg 1 tab for 1-5  Days with breakfast for unrelenting migraine in moderation, Disp: 5 tablet, Rfl: 0    fluticasone (FLONASE) 50 mcg/act nasal spray, USE 1 TO 2 SPRAYS IN EACH NOSTRIL EVERY DAY AS NEEDED, Disp: , Rfl:     Iron-Vitamin C  MG TABS, Take 1 tablet by mouth in the morning, Disp: , Rfl:     Linzess 290 MCG CAPS, Take 1 capsule by mouth daily before breakfast Take 30 minutes before, Disp: , Rfl:     Magnesium Oxide -Mg Supplement 400 MG CAPS, Take 400 mg by mouth Daily at 2am, Disp: , Rfl:     Melatonin 10 MG TABS, Take 10 mg by mouth if needed, Disp: , Rfl:     metoclopramide (REGLAN) 5 mg tablet, , Disp: , Rfl:     Motegrity 2 MG TABS, Take 1 tablet by mouth daily, Disp: , Rfl:     Omega-3 Fatty Acids (fish oil) 1,000 mg, Take 1,000 mg by mouth daily, Disp: , Rfl:     omeprazole (PriLOSEC) 20 mg delayed release capsule, TAKE 1 CAPSULE BY MOUTH TWO TIMES A DAY, Disp: , Rfl:     ondansetron (ZOFRAN-ODT) 4 mg disintegrating tablet, Take 2 tablets (8 mg total) by mouth every 8 (eight) hours as needed for nausea or vomiting, Disp: 20 tablet, Rfl: 4    ondansetron (ZOFRAN-ODT) 8 mg disintegrating tablet, DISSOLVE ONE TABLET BY MOUTH EVERY DAY AS NEEDED, Disp: , Rfl:     Riboflavin (B2 PO), Take 1 tablet by mouth in the morning, Disp: , Rfl:     rizatriptan (MAXALT) 10 mg tablet, TAKE 1 TABLET BY MOUTH ONCE AS NEEDED  FOR MIGRAINE. MAY REPEAT DOSE IN 2 HOURS IF NEEDED. MAX 2/24 HOURS. 9/MONTH, Disp: 9 tablet, Rfl: 6    traZODone (DESYREL) 50 mg tablet, Take 1 tablet (50 mg total) by mouth daily at bedtime as needed for sleep May repeat x 1 as needed for sleep, Disp: 30 tablet, Rfl: 1    amphetamine-dextroamphetamine (ADDERALL) 10 mg tablet, Take 1-2 tablets by mouth daily (Patient not taking: Reported on 5/1/2023), Disp: , Rfl:     amphetamine-dextroamphetamine (ADDERALL) 15 MG tablet, Take 15 mg by mouth daily at bedtime as needed (Patient not taking: Reported on 5/1/2023), Disp: , Rfl:     Galcanezumab-gnlm (Emgality) 120 MG/ML SOAJ, Inject 1 pen subcutaneously every 28 days, Disp: 1 mL, Rfl: 11    ibuprofen (MOTRIN) 800 mg tablet, Take 1 tablet (800 mg total) by mouth every 6 (six) hours as needed for headaches, Disp: 15 tablet, Rfl: 0    Lasmiditan Succinate (REYVOW) 100 MG tablet, Take 1 tablet (100mg)  one time as needed for migraine. Do not use more than one dose per day, or more than 8 doses per month, Disp: 8 tablet, Rfl: 3    metoclopramide (Reglan) 10 mg tablet, Take 1 tablet (10 mg total) by mouth 4 (four) times a day, Disp: 20 tablet, Rfl: 6    metoprolol succinate (TOPROL-XL) 25 mg 24 hr tablet, Take 25 mg by mouth every morning, Disp: , Rfl:     Rimegepant Sulfate (Nurtec) 75 MG TBDP, Take 75 mg by mouth as needed (migraine) Limit of 1 in 24 hours, Disp: 16 tablet, Rfl: 6    Suflave 178.7 g SOLR, TAKE 1 KIT AS DIRECTED BY OFFICE FOR PREP, Disp: , Rfl:     topiramate (TOPAMAX) 25 mg tablet, Take 25 mg by mouth daily at bedtime, Disp: , Rfl:     Current Allergies     Allergies as of 02/03/2024 - Reviewed 02/03/2024   Allergen Reaction Noted    Lyrica [pregabalin] Other (See Comments) 04/12/2017            The following portions of the patient's history were reviewed and updated as appropriate: allergies, current medications, past family history, past medical history, past social history, past surgical history and  "problem list.     Past Medical History:   Diagnosis Date    Anxiety     BRCA1 negative 2018    Unsure which BRCA she was tested for    BRCA2 negative 2018    Unsure which BRCA she was tested for    Chickenpox     Cluster headache     Depression     Gastroparesis     Headache, tension-type     Migraines        Past Surgical History:   Procedure Laterality Date    BACK SURGERY      LOWER Description: LS-S1 disc surgery    COLONOSCOPY      EGD      ORTHOPEDIC SURGERY      WTE    ND LARGSC EXC VEENA&/STRPG CORDS/EPIGL MCRSCP/TLSCP Left 11/29/2023    Procedure: MICROLARYNGOSCOPY AND EXCISION;  Surgeon: Juan Jose Flynn MD;  Location: [facility];  Service: ENT    WISDOM TOOTH EXTRACTION  08/1999       Family History   Problem Relation Age of Onset    No Known Problems Mother     Hypertension Father     No Known Problems Daughter     Breast cancer Maternal Grandmother         unknown age    Stroke Maternal Grandmother     Cancer Maternal Grandfather         Unknown origin and age    Breast cancer Paternal Grandmother         unknown age    No Known Problems Paternal Grandfather     Cancer Son 4        Neuroblastoma of abdomen    No Known Problems Son     Ovarian cancer Maternal Aunt         at age 32    Breast cancer Maternal Aunt 32    No Known Problems Maternal Aunt     No Known Problems Maternal Aunt     No Known Problems Maternal Aunt     No Known Problems Maternal Aunt     Breast cancer Paternal Aunt         dx at age 49    No Known Problems Paternal Aunt     Cancer Son         Neuroblastoma    Sleep apnea Neg Hx          Medications have been verified.        Objective   /70   Pulse 88   Temp 98.8 °F (37.1 °C)   Resp 18   Ht 5' 3\" (1.6 m)   Wt 72.6 kg (160 lb)   SpO2 99%   BMI 28.34 kg/m²   No LMP recorded.       Physical Exam     Physical Exam  Constitutional:       Appearance: Normal appearance. She is normal weight.   HENT:      Head: Normocephalic and atraumatic.      Right Ear: Tympanic membrane " and ear canal normal.      Left Ear: Tympanic membrane, ear canal and external ear normal.      Ears:        Comments: Crura of antihlex swollen and warm to touch in the R ear.  Ear ring removed from site     Nose: Nose normal.      Mouth/Throat:      Mouth: Mucous membranes are moist.      Pharynx: Oropharynx is clear.   Eyes:      Conjunctiva/sclera: Conjunctivae normal.      Pupils: Pupils are equal, round, and reactive to light.   Cardiovascular:      Rate and Rhythm: Normal rate and regular rhythm.      Pulses: Normal pulses.      Heart sounds: Normal heart sounds.   Pulmonary:      Effort: Pulmonary effort is normal.      Breath sounds: Normal breath sounds.   Abdominal:      General: Abdomen is flat. Bowel sounds are normal.   Musculoskeletal:      Cervical back: Normal range of motion. Tenderness present.   Neurological:      Mental Status: She is alert.

## 2024-02-03 NOTE — PATIENT INSTRUCTIONS
Take the antibiotic as prescribed  Warm soaks to the right ear   Do not put any ear rings back in that ear  With your PCP  You can continue all the other medications that you are taking for your COVID symptoms

## 2024-02-21 ENCOUNTER — HOSPITAL ENCOUNTER (OUTPATIENT)
Dept: MAMMOGRAPHY | Facility: IMAGING CENTER | Age: 44
Discharge: HOME | End: 2024-02-21
Payer: COMMERCIAL

## 2024-02-21 VITALS — BODY MASS INDEX: 28.35 KG/M2 | HEIGHT: 63 IN | WEIGHT: 160 LBS

## 2024-02-21 DIAGNOSIS — Z12.31 ENCOUNTER FOR SCREENING MAMMOGRAM FOR MALIGNANT NEOPLASM OF BREAST: ICD-10-CM

## 2024-02-21 PROCEDURE — 77067 SCR MAMMO BI INCL CAD: CPT

## 2024-02-21 PROCEDURE — 77063 BREAST TOMOSYNTHESIS BI: CPT

## 2024-04-12 ENCOUNTER — ANNUAL EXAM (OUTPATIENT)
Age: 44
End: 2024-04-12
Payer: COMMERCIAL

## 2024-04-12 VITALS
SYSTOLIC BLOOD PRESSURE: 122 MMHG | WEIGHT: 165.8 LBS | HEIGHT: 64 IN | DIASTOLIC BLOOD PRESSURE: 64 MMHG | BODY MASS INDEX: 28.31 KG/M2

## 2024-04-12 DIAGNOSIS — Z12.31 ENCOUNTER FOR SCREENING MAMMOGRAM FOR MALIGNANT NEOPLASM OF BREAST: ICD-10-CM

## 2024-04-12 DIAGNOSIS — Z01.419 ENCOUNTER FOR ANNUAL ROUTINE GYNECOLOGICAL EXAMINATION: Primary | ICD-10-CM

## 2024-04-12 DIAGNOSIS — F32.81 PMDD (PREMENSTRUAL DYSPHORIC DISORDER): ICD-10-CM

## 2024-04-12 PROCEDURE — S0612 ANNUAL GYNECOLOGICAL EXAMINA: HCPCS | Performed by: OBSTETRICS & GYNECOLOGY

## 2024-04-12 RX ORDER — ACETAMINOPHEN AND CODEINE PHOSPHATE 120; 12 MG/5ML; MG/5ML
1 SOLUTION ORAL DAILY
Qty: 90 TABLET | Refills: 3 | Status: SHIPPED | OUTPATIENT
Start: 2024-04-12 | End: 2024-07-11

## 2024-04-12 NOTE — PROGRESS NOTES
Assessment/Plan:    1. Encounter for annual routine gynecological examination      2. Encounter for screening mammogram for malignant neoplasm of breast    - Mammo screening bilateral w 3d & cad; Future    3. PMDD (premenstrual dysphoric disorder)    - norethindrone (Melia) 0.35 MG tablet; Take 1 tablet (0.35 mg total) by mouth daily  Dispense: 90 tablet; Refill: 3      Subjective      Neyda Brooks is a 43 y.o. female who presents for annual exam. Periods are regular. She notes significant increase in anxiety one week prior to menses - increases frequency of xanax during those days.  Bladder leakage is stable.  She denies any breast concerns.      Current contraception: vasectomy  History of abnormal Pap smear: no  Regular self breast exam: yes  History of abnormal mammogram: no  History of abnormal lipids: no    Menstrual History:  OB History          3    Para   3    Term   3       0    AB   0    Living   2         SAB   0    IAB   0    Ectopic   0    Multiple   0    Live Births   3           Obstetric Comments   2000 - 39 wks, , male, 7#, [facility], breast fed x 14 months;  age 6 neuroblastoma  2003 - 39 weeks, , male, 8#, [facility], breast fed x 6 months  9/15/2006 - 38 weeks, nvsd, female, 7#, [facility], breast fed x 6 weeks     Menarche age 10              Menarche age: 10  Patient's last menstrual period was 2024 (exact date).  Period Cycle (Days): 28  Period Duration (Days): 1-2  Period Pattern: Regular  Menstrual Flow: Light  Menstrual Control: Tampon  Dysmenorrhea: (!) Mild (week before menses- has increased anxiety- increases xanax)  Dysmenorrhea Symptoms: Cramping    Past Medical History:   Diagnosis Date    Anxiety     BRCA1 negative     Unsure which BRCA she was tested for    BRCA2 negative     Unsure which BRCA she was tested for    Chickenpox     Cluster headache     Depression     Gastroparesis     Headache, tension-type     Migraines        Family History  "  Problem Relation Age of Onset    No Known Problems Mother     Hypertension Father     No Known Problems Daughter     Breast cancer Maternal Grandmother         unknown age    Stroke Maternal Grandmother     Cancer Maternal Grandfather         Unknown origin and age    Breast cancer Paternal Grandmother         unknown age    No Known Problems Paternal Grandfather     Cancer Son 4        Neuroblastoma of abdomen    Cancer Son         Neuroblastoma    Ovarian cancer Maternal Aunt         at age 32    Breast cancer Maternal Aunt 32    No Known Problems Maternal Aunt     No Known Problems Maternal Aunt     No Known Problems Maternal Aunt     No Known Problems Maternal Aunt     Breast cancer Paternal Aunt         dx at age 49    No Known Problems Paternal Aunt     Sleep apnea Neg Hx        The following portions of the patient's history were reviewed and updated as appropriate: allergies, current medications, past family history, past medical history, past social history, past surgical history, and problem list.    Review of Systems  Pertinent items are noted in HPI.      Objective      /64 (BP Location: Right arm, Patient Position: Sitting, Cuff Size: Standard)   Ht 5' 4\" (1.626 m)   Wt 75.2 kg (165 lb 12.8 oz)   LMP 03/11/2024 (Exact Date)   BMI 28.46 kg/m²     General:   alert and oriented, in no acute distress   Heart:  Breasts: regular rate and rhythm  appear normal, no suspicious masses, no skin or nipple changes or axillary nodes.   Lungs: Effort normal   Abdomen: soft, non-tender, without masses or organomegaly   Vulva: normal   Vagina: normal mucosa   Cervix: no lesions   Uterus: normal size, mobile, non-tender   Adnexa: normal adnexa and no mass, fullness, tenderness               "

## 2024-05-21 ENCOUNTER — OFFICE VISIT (OUTPATIENT)
Dept: DERMATOLOGY | Facility: CLINIC | Age: 44
End: 2024-05-21
Payer: COMMERCIAL

## 2024-05-21 DIAGNOSIS — L81.4 SOLAR LENTIGO: Primary | ICD-10-CM

## 2024-05-21 PROCEDURE — 99203 OFFICE O/P NEW LOW 30 MIN: CPT | Performed by: DERMATOLOGY

## 2024-05-21 NOTE — PROGRESS NOTES
"[facility] Dermatology Clinic Note     Patient Name: Neyda Brooks  Encounter Date: 5/21/2024     Have you been cared for by a [facility] Dermatologist in the last 3 years and, if so, which description applies to you?    NO.   I am considered a \"new\" patient and must complete all patient intake questions. I am FEMALE/of child-bearing potential.    REVIEW OF SYSTEMS:  Have you recently had or currently have any of the following? Recent fever or chills? No  Any non-healing wound? No  Are you pregnant or planning to become pregnant? No  Are you currently or planning to be nursing or breast feeding? No   PAST MEDICAL HISTORY:  Have you personally ever had or currently have any of the following?  If \"YES,\" then please provide more detail. Skin cancer (such as Melanoma, Basal Cell Carcinoma, Squamous Cell Carcinoma?  No  Tuberculosis, HIV/AIDS, Hepatitis B or C: No  Radiation Treatment YES, was in contact with son while son was receiving radiation treatment in 2006 and 2007   HISTORY OF IMMUNOSUPPRESSION:   Do you have a history of any of the following:  Systemic Immunosuppression such as Diabetes, Biologic or Immunotherapy, Chemotherapy, Organ Transplantation, Bone Marrow Transplantation?  No    Answering \"YES\" requires the addition of the dotphrase \"IMMUNOSUPPRESSED\" as the first diagnosis of the patient's visit.   FAMILY HISTORY:  Any \"first degree relatives\" (parent, brother, sister, or child) with the following?    Skin Cancer, Pancreatic or Other Cancer? YES, son - neuroblastoma   PATIENT EXPERIENCE:    Do you want the Dermatologist to perform a COMPLETE skin exam today including a clinical examination under the \"bra and underwear\" areas?  NO  If necessary, do we have your permission to call and leave a detailed message on your Preferred Phone number that includes your specific medical information?  Yes      Allergies   Allergen Reactions   • Lyrica [Pregabalin] Other (See Comments)     Other reaction(s): Suicidal " ideation      Current Outpatient Medications:   •  Cholecalciferol (VITAMIN D3) 2000 units TABS, Take 2,000 Units by mouth in the morning, Disp: , Rfl:   •  desvenlafaxine succinate (PRISTIQ) 50 mg 24 hr tablet, Take 1 tablet (50 mg total) by mouth daily, Disp: 30 tablet, Rfl: 0  •  dexamethasone (DECADRON) 2 mg tablet, Decadron 2 mg 1 tab for 1-5  Days with breakfast for unrelenting migraine in moderation, Disp: 5 tablet, Rfl: 0  •  fluticasone (FLONASE) 50 mcg/act nasal spray, USE 1 TO 2 SPRAYS IN EACH NOSTRIL EVERY DAY AS NEEDED, Disp: , Rfl:   •  Iron-Vitamin C  MG TABS, Take 1 tablet by mouth in the morning, Disp: , Rfl:   •  Linzess 290 MCG CAPS, Take 1 capsule by mouth daily before breakfast Take 30 minutes before, Disp: , Rfl:   •  Magnesium Oxide -Mg Supplement 400 MG CAPS, Take 400 mg by mouth Daily at 2am, Disp: , Rfl:   •  Melatonin 10 MG TABS, Take 10 mg by mouth if needed, Disp: , Rfl:   •  metoclopramide (REGLAN) 5 mg tablet, , Disp: , Rfl:   •  Motegrity 2 MG TABS, Take 1 tablet by mouth daily, Disp: , Rfl:   •  Omega-3 Fatty Acids (fish oil) 1,000 mg, Take 1,000 mg by mouth daily, Disp: , Rfl:   •  omeprazole (PriLOSEC) 20 mg delayed release capsule, TAKE 1 CAPSULE BY MOUTH TWO TIMES A DAY, Disp: , Rfl:   •  ondansetron (ZOFRAN-ODT) 4 mg disintegrating tablet, Take 2 tablets (8 mg total) by mouth every 8 (eight) hours as needed for nausea or vomiting, Disp: 20 tablet, Rfl: 4  •  ondansetron (ZOFRAN-ODT) 8 mg disintegrating tablet, DISSOLVE ONE TABLET BY MOUTH EVERY DAY AS NEEDED, Disp: , Rfl:   •  Riboflavin (B2 PO), Take 1 tablet by mouth in the morning, Disp: , Rfl:   •  rizatriptan (MAXALT) 10 mg tablet, TAKE 1 TABLET BY MOUTH ONCE AS NEEDED FOR MIGRAINE. MAY REPEAT DOSE IN 2 HOURS IF NEEDED. MAX 2/24 HOURS. 9/MONTH, Disp: 9 tablet, Rfl: 6  •  traZODone (DESYREL) 50 mg tablet, Take 1 tablet (50 mg total) by mouth daily at bedtime as needed for sleep May repeat x 1 as needed for sleep, Disp:  30 tablet, Rfl: 1  •  ALPRAZolam (XANAX) 1 mg tablet, Take 1 mg by mouth 3 (three) times a day, Disp: , Rfl:   •  amphetamine-dextroamphetamine (ADDERALL XR) 20 MG 24 hr capsule, Take 20 mg by mouth every morning, Disp: , Rfl:   •  amphetamine-dextroamphetamine (ADDERALL) 10 mg tablet, Take 1-2 tablets by mouth daily (Patient not taking: Reported on 5/1/2023), Disp: , Rfl:   •  amphetamine-dextroamphetamine (ADDERALL) 15 MG tablet, Take 15 mg by mouth daily at bedtime as needed (Patient not taking: Reported on 5/1/2023), Disp: , Rfl:   •  Galcanezumab-gnlm (Emgality) 120 MG/ML SOAJ, Inject 1 pen subcutaneously every 28 days, Disp: 1 mL, Rfl: 11  •  ibuprofen (MOTRIN) 800 mg tablet, Take 1 tablet (800 mg total) by mouth every 6 (six) hours as needed for headaches, Disp: 15 tablet, Rfl: 0  •  Lasmiditan Succinate (REYVOW) 100 MG tablet, Take 1 tablet (100mg)  one time as needed for migraine. Do not use more than one dose per day, or more than 8 doses per month, Disp: 8 tablet, Rfl: 3  •  metoclopramide (Reglan) 10 mg tablet, Take 1 tablet (10 mg total) by mouth 4 (four) times a day, Disp: 20 tablet, Rfl: 6  •  metoprolol succinate (TOPROL-XL) 25 mg 24 hr tablet, Take 25 mg by mouth every morning, Disp: , Rfl:   •  norethindrone (Melia) 0.35 MG tablet, Take 1 tablet (0.35 mg total) by mouth daily, Disp: 90 tablet, Rfl: 3  •  Rimegepant Sulfate (Nurtec) 75 MG TBDP, Take 75 mg by mouth as needed (migraine) Limit of 1 in 24 hours, Disp: 16 tablet, Rfl: 6  •  Suflave 178.7 g SOLR, TAKE 1 KIT AS DIRECTED BY OFFICE FOR PREP, Disp: , Rfl:   •  topiramate (TOPAMAX) 25 mg tablet, Take 25 mg by mouth daily at bedtime, Disp: , Rfl:         Whom besides the patient is providing clinical information about today's encounter?   NO ADDITIONAL HISTORIAN (patient alone provided history)    Physical Exam and Assessment/Plan by Diagnosis:     SOLAR LENTIGINES   OTHER SKIN CHANGES DUE TO CHRONIC EXPOSURE TO NONIONIZING RADIATION      Physical Exam:  Anatomic Location Affected:  Sun exposed areas of back, chest, arms, legs. Right temple - photo taken  Morphological Description:  Multiple scattered brown to tan evenly pigmented macules   Denies pain, itch, bleeding. No treatments tried. Present for months - years. Reports getting newer lesions with sun exposure.         Assessment and Plan:  Based on a thorough discussion of this condition and the management approach to it (including a comprehensive discussion of the known risks, side effects and potential benefits of treatment), the patient (family) agrees to implement the following specific plan:  Reassure benign  Use sun protection.  Apply SPF 30 or higher at least three times a day.  Wear sun protecting clothing and hats.      Patient will return to office on 7/9/2024 for full body exam.    Scribe Attestation    I,:  Kinza Barnett am acting as a scribe while in the presence of the attending physician.:       I,:  Asael Juárez MD personally performed the services described in this documentation    as scribed in my presence.:

## 2024-07-09 ENCOUNTER — OFFICE VISIT (OUTPATIENT)
Dept: DERMATOLOGY | Facility: CLINIC | Age: 44
End: 2024-07-09
Payer: COMMERCIAL

## 2024-07-09 VITALS — HEIGHT: 64 IN | WEIGHT: 165 LBS | BODY MASS INDEX: 28.17 KG/M2

## 2024-07-09 DIAGNOSIS — L81.4 SOLAR LENTIGO: ICD-10-CM

## 2024-07-09 DIAGNOSIS — D22.9 MULTIPLE BENIGN MELANOCYTIC NEVI: Primary | ICD-10-CM

## 2024-07-09 DIAGNOSIS — D18.01 CHERRY ANGIOMA: ICD-10-CM

## 2024-07-09 PROCEDURE — 99213 OFFICE O/P EST LOW 20 MIN: CPT

## 2024-07-09 NOTE — PATIENT INSTRUCTIONS
MELANOCYTIC NEVI  -Relevant exam: Scattered over the trunk/extremities are homogenously pigmented brown macules and papules. ELM performed and without concerning findings.  - Exam and clinical history consistent with melanocytic nevi  - Educated on the ABCDE's of melanoma; handout provided  - Counseled to return to clinic prior to scheduled appointment should any of these lesions change or should any new lesions of concern arise  - Counseled on use of sun protection daily. Reviewed latest FDA sunscreen guidelines, including use of broad spectrum (UVA and UVB blocking) sunscreen or sun protective clothing with SPF 30-50 every 2-3 hours and reapplied after exposure to water; use of photoprotective clothing, including a broad brim hat and UPF rated clothing if outdoors for several hours; avoid use of tanning beds as these pose significant risk for melanoma and skin cancer.    LENTIGINES-  OTHER SKIN CHANGES DUE TO CHRONIC EXPOSURE TO NONIONIZING RADIATION  - Relevant exam: Over sun exposed areas as well as on right labia are brown macules. ELM performed and without concerning findings.  - Exam and clinical history consistent with lentigines.  - Educated that these are indicative of prior sun exposure.   - Counseled to return to clinic prior to scheduled appointment should any of these lesions change or should any new lesions of concern arise.  - Recommended use of sunscreen as above and below.  - Counseled on use of sun protection daily. Reviewed latest FDA sunscreen guidelines, including use of broad spectrum (UVA and UVB blocking) sunscreen or sun protective clothing with SPF 30-50 every 2-3 hours and reapplied after exposure to water; use of photoprotective clothing, including a broad brim hat and UPF rated clothing if outdoors for several hours; avoid use of tanning beds as these pose significant risk for melanoma and skin cancer.    CHERRY ANGIOMAS  - Relevant exam: Scattered over the trunk/extremities are red  papules  - Exam and clinical history consistent with cherry angiomas  - Educated that these are benign  - Educated that removal is considered aesthetic and would incur a fee.  - Patient does not wish to pursue removal at this time but will contact us should this change.     Follow up every year for skin exam   Continue to practice healthy Suncreen SPF 30+ when outdoors and reapply every 2 hrs  Consider buying sunguard to use in laundry to give clothing SPF protection or wear UPF clothing

## 2024-07-09 NOTE — PROGRESS NOTES
"[facility] Dermatology Clinic Note     Patient Name: Neyda Brooks  Encounter Date: 07/09/24     Have you been cared for by a [facility] Dermatologist in the last 3 years and, if so, which description applies to you?    Yes.  I have been here within the last 3 years, and my medical history has NOT changed since that time.  I am FEMALE/of child-bearing potential.    REVIEW OF SYSTEMS:  Have you recently had or currently have any of the following? No changes in my recent health.   PAST MEDICAL HISTORY:  Have you personally ever had or currently have any of the following?  If \"YES,\" then please provide more detail. No changes in my medical history.   HISTORY OF IMMUNOSUPPRESSION: Do you have a history of any of the following:  Systemic Immunosuppression such as Diabetes, Biologic or Immunotherapy, Chemotherapy, Organ Transplantation, Bone Marrow Transplantation?  No     Answering \"YES\" requires the addition of the dotphrase \"IMMUNOSUPPRESSED\" as the first diagnosis of the patient's visit.   FAMILY HISTORY:  Any \"first degree relatives\" (parent, brother, sister, or child) with the following?    No changes in my family's known health.   PATIENT EXPERIENCE:    Do you want the Dermatologist to perform a COMPLETE skin exam today including a clinical examination under the \"bra and underwear\" areas?  Yes  If necessary, do we have your permission to call and leave a detailed message on your Preferred Phone number that includes your specific medical information?  Yes      Allergies   Allergen Reactions    Lyrica [Pregabalin] Other (See Comments)     Other reaction(s): Suicidal ideation      Current Outpatient Medications:     ALPRAZolam (XANAX) 1 mg tablet, Take 1 mg by mouth 3 (three) times a day, Disp: , Rfl:     amphetamine-dextroamphetamine (ADDERALL XR) 20 MG 24 hr capsule, Take 20 mg by mouth every morning, Disp: , Rfl:     amphetamine-dextroamphetamine (ADDERALL) 10 mg tablet, Take 1-2 tablets by mouth daily (Patient not " taking: Reported on 5/1/2023), Disp: , Rfl:     amphetamine-dextroamphetamine (ADDERALL) 15 MG tablet, Take 15 mg by mouth daily at bedtime as needed (Patient not taking: Reported on 5/1/2023), Disp: , Rfl:     Cholecalciferol (VITAMIN D3) 2000 units TABS, Take 2,000 Units by mouth in the morning, Disp: , Rfl:     desvenlafaxine succinate (PRISTIQ) 50 mg 24 hr tablet, Take 1 tablet (50 mg total) by mouth daily, Disp: 30 tablet, Rfl: 0    dexamethasone (DECADRON) 2 mg tablet, Decadron 2 mg 1 tab for 1-5  Days with breakfast for unrelenting migraine in moderation, Disp: 5 tablet, Rfl: 0    fluticasone (FLONASE) 50 mcg/act nasal spray, USE 1 TO 2 SPRAYS IN EACH NOSTRIL EVERY DAY AS NEEDED, Disp: , Rfl:     Galcanezumab-gnlm (Emgality) 120 MG/ML SOAJ, Inject 1 pen subcutaneously every 28 days, Disp: 1 mL, Rfl: 11    ibuprofen (MOTRIN) 800 mg tablet, Take 1 tablet (800 mg total) by mouth every 6 (six) hours as needed for headaches, Disp: 15 tablet, Rfl: 0    Iron-Vitamin C  MG TABS, Take 1 tablet by mouth in the morning, Disp: , Rfl:     Lasmiditan Succinate (REYVOW) 100 MG tablet, Take 1 tablet (100mg)  one time as needed for migraine. Do not use more than one dose per day, or more than 8 doses per month, Disp: 8 tablet, Rfl: 3    Linzess 290 MCG CAPS, Take 1 capsule by mouth daily before breakfast Take 30 minutes before, Disp: , Rfl:     Magnesium Oxide -Mg Supplement 400 MG CAPS, Take 400 mg by mouth Daily at 2am, Disp: , Rfl:     Melatonin 10 MG TABS, Take 10 mg by mouth if needed, Disp: , Rfl:     metoclopramide (Reglan) 10 mg tablet, Take 1 tablet (10 mg total) by mouth 4 (four) times a day, Disp: 20 tablet, Rfl: 6    metoclopramide (REGLAN) 5 mg tablet, , Disp: , Rfl:     metoprolol succinate (TOPROL-XL) 25 mg 24 hr tablet, Take 25 mg by mouth every morning, Disp: , Rfl:     Motegrity 2 MG TABS, Take 1 tablet by mouth daily, Disp: , Rfl:     norethindrone (Melia) 0.35 MG tablet, Take 1 tablet (0.35 mg  total) by mouth daily, Disp: 90 tablet, Rfl: 3    Omega-3 Fatty Acids (fish oil) 1,000 mg, Take 1,000 mg by mouth daily, Disp: , Rfl:     omeprazole (PriLOSEC) 20 mg delayed release capsule, TAKE 1 CAPSULE BY MOUTH TWO TIMES A DAY, Disp: , Rfl:     ondansetron (ZOFRAN-ODT) 4 mg disintegrating tablet, Take 2 tablets (8 mg total) by mouth every 8 (eight) hours as needed for nausea or vomiting, Disp: 20 tablet, Rfl: 4    ondansetron (ZOFRAN-ODT) 8 mg disintegrating tablet, DISSOLVE ONE TABLET BY MOUTH EVERY DAY AS NEEDED, Disp: , Rfl:     Riboflavin (B2 PO), Take 1 tablet by mouth in the morning, Disp: , Rfl:     Rimegepant Sulfate (Nurtec) 75 MG TBDP, Take 75 mg by mouth as needed (migraine) Limit of 1 in 24 hours, Disp: 16 tablet, Rfl: 6    rizatriptan (MAXALT) 10 mg tablet, TAKE 1 TABLET BY MOUTH ONCE AS NEEDED FOR MIGRAINE. MAY REPEAT DOSE IN 2 HOURS IF NEEDED. MAX 2/24 HOURS. 9/MONTH, Disp: 9 tablet, Rfl: 6    Suflave 178.7 g SOLR, TAKE 1 KIT AS DIRECTED BY OFFICE FOR PREP, Disp: , Rfl:     topiramate (TOPAMAX) 25 mg tablet, Take 25 mg by mouth daily at bedtime, Disp: , Rfl:     traZODone (DESYREL) 50 mg tablet, Take 1 tablet (50 mg total) by mouth daily at bedtime as needed for sleep May repeat x 1 as needed for sleep, Disp: 30 tablet, Rfl: 1          Whom besides the patient is providing clinical information about today's encounter?   NO ADDITIONAL HISTORIAN (patient alone provided history)    Physical Exam and Assessment/Plan by Diagnosis:  MELANOCYTIC NEVI  -Relevant exam: Scattered over the trunk/extremities are homogenously pigmented brown macules and papules. ELM performed and without concerning findings.  - Exam and clinical history consistent with melanocytic nevi  - Educated on the ABCDE's of melanoma; handout provided  - Counseled to return to clinic prior to scheduled appointment should any of these lesions change or should any new lesions of concern arise  - Counseled on use of sun protection daily.  Reviewed latest FDA sunscreen guidelines, including use of broad spectrum (UVA and UVB blocking) sunscreen or sun protective clothing with SPF 30-50 every 2-3 hours and reapplied after exposure to water; use of photoprotective clothing, including a broad brim hat and UPF rated clothing if outdoors for several hours; avoid use of tanning beds as these pose significant risk for melanoma and skin cancer.    LENTIGINES-  OTHER SKIN CHANGES DUE TO CHRONIC EXPOSURE TO NONIONIZING RADIATION  - Relevant exam: Over sun exposed areas as well as on right labia are brown macules. ELM performed and without concerning findings.  - Exam and clinical history consistent with lentigines.  - Educated that these are indicative of prior sun exposure.   - Counseled to return to clinic prior to scheduled appointment should any of these lesions change or should any new lesions of concern arise.  - Recommended use of sunscreen as above and below.  - Counseled on use of sun protection daily. Reviewed latest FDA sunscreen guidelines, including use of broad spectrum (UVA and UVB blocking) sunscreen or sun protective clothing with SPF 30-50 every 2-3 hours and reapplied after exposure to water; use of photoprotective clothing, including a broad brim hat and UPF rated clothing if outdoors for several hours; avoid use of tanning beds as these pose significant risk for melanoma and skin cancer.    CHERRY ANGIOMAS  - Relevant exam: Scattered over the trunk/extremities are red papules  - Exam and clinical history consistent with cherry angiomas  - Educated that these are benign  - Educated that removal is considered aesthetic and would incur a fee.  - Patient does not wish to pursue removal at this time but will contact us should this change.     Follow up every year for skin exam   Continue to practice healthy Suncreen SPF 30+ when outdoors and reapply every 2 hrs  Consider buying sunguard to use in laundry to give clothing SPF protection or wear  UPF clothing    Scribe Attestation      I,:  Jocelynbobby Joy am acting as a scribe while in the presence of the attending physician.:       I,:  Claire Barlow PA-C personally performed the services described in this documentation    as scribed in my presence.:

## 2024-07-30 ENCOUNTER — APPOINTMENT (OUTPATIENT)
Dept: LAB | Facility: CLINIC | Age: 44
End: 2024-07-30
Payer: COMMERCIAL

## 2024-07-30 DIAGNOSIS — N39.0 URINARY TRACT INFECTION WITHOUT HEMATURIA, SITE UNSPECIFIED: ICD-10-CM

## 2024-07-30 DIAGNOSIS — I51.9 MYXEDEMA HEART DISEASE: ICD-10-CM

## 2024-07-30 DIAGNOSIS — I10 ESSENTIAL HYPERTENSION, MALIGNANT: ICD-10-CM

## 2024-07-30 DIAGNOSIS — E55.9 AVITAMINOSIS D: ICD-10-CM

## 2024-07-30 DIAGNOSIS — N95.1 SYMPTOMATIC MENOPAUSAL OR FEMALE CLIMACTERIC STATES: ICD-10-CM

## 2024-07-30 DIAGNOSIS — D52.9 ANEMIA DUE TO FOLIC ACID DEFICIENCY, UNSPECIFIED DEFICIENCY TYPE: ICD-10-CM

## 2024-07-30 DIAGNOSIS — E03.9 MYXEDEMA HEART DISEASE: ICD-10-CM

## 2024-07-30 DIAGNOSIS — E53.8 BIOTIN-(PROPIONYL-COA-CARBOXYLASE) LIGASE DEFICIENCY: ICD-10-CM

## 2024-07-30 LAB
25(OH)D3 SERPL-MCNC: 57.3 NG/ML (ref 30–100)
ALBUMIN SERPL BCG-MCNC: 4.5 G/DL (ref 3.5–5)
ALP SERPL-CCNC: 39 U/L (ref 34–104)
ALT SERPL W P-5'-P-CCNC: 26 U/L (ref 7–52)
ANION GAP SERPL CALCULATED.3IONS-SCNC: 9 MMOL/L (ref 4–13)
AST SERPL W P-5'-P-CCNC: 22 U/L (ref 13–39)
BACTERIA UR QL AUTO: ABNORMAL /HPF
BILIRUB SERPL-MCNC: 0.41 MG/DL (ref 0.2–1)
BILIRUB UR QL STRIP: NEGATIVE
BUN SERPL-MCNC: 13 MG/DL (ref 5–25)
CALCIUM SERPL-MCNC: 9.5 MG/DL (ref 8.4–10.2)
CHLORIDE SERPL-SCNC: 102 MMOL/L (ref 96–108)
CLARITY UR: CLEAR
CO2 SERPL-SCNC: 26 MMOL/L (ref 21–32)
COLOR UR: COLORLESS
CREAT SERPL-MCNC: 0.67 MG/DL (ref 0.6–1.3)
FOLATE SERPL-MCNC: 12.8 NG/ML
GFR SERPL CREATININE-BSD FRML MDRD: 107 ML/MIN/1.73SQ M
GLUCOSE P FAST SERPL-MCNC: 88 MG/DL (ref 65–99)
GLUCOSE UR STRIP-MCNC: NEGATIVE MG/DL
HGB UR QL STRIP.AUTO: ABNORMAL
KETONES UR STRIP-MCNC: NEGATIVE MG/DL
LEUKOCYTE ESTERASE UR QL STRIP: NEGATIVE
NITRITE UR QL STRIP: NEGATIVE
NON-SQ EPI CELLS URNS QL MICRO: ABNORMAL /HPF
PH UR STRIP.AUTO: 6.5 [PH]
POTASSIUM SERPL-SCNC: 4.4 MMOL/L (ref 3.5–5.3)
PROT SERPL-MCNC: 6.9 G/DL (ref 6.4–8.4)
PROT UR STRIP-MCNC: NEGATIVE MG/DL
RBC #/AREA URNS AUTO: ABNORMAL /HPF
SODIUM SERPL-SCNC: 137 MMOL/L (ref 135–147)
SP GR UR STRIP.AUTO: 1 (ref 1–1.03)
T4 FREE SERPL-MCNC: 0.7 NG/DL (ref 0.61–1.12)
TSH SERPL DL<=0.05 MIU/L-ACNC: 2.66 UIU/ML (ref 0.45–4.5)
UROBILINOGEN UR STRIP-ACNC: <2 MG/DL
VIT B12 SERPL-MCNC: 297 PG/ML (ref 180–914)
WBC #/AREA URNS AUTO: ABNORMAL /HPF

## 2024-07-30 PROCEDURE — 84443 ASSAY THYROID STIM HORMONE: CPT

## 2024-07-30 PROCEDURE — 82746 ASSAY OF FOLIC ACID SERUM: CPT

## 2024-07-30 PROCEDURE — 82670 ASSAY OF TOTAL ESTRADIOL: CPT

## 2024-07-30 PROCEDURE — 82607 VITAMIN B-12: CPT

## 2024-07-30 PROCEDURE — 80053 COMPREHEN METABOLIC PANEL: CPT

## 2024-07-30 PROCEDURE — 84439 ASSAY OF FREE THYROXINE: CPT

## 2024-07-30 PROCEDURE — 82306 VITAMIN D 25 HYDROXY: CPT

## 2024-07-30 PROCEDURE — 81001 URINALYSIS AUTO W/SCOPE: CPT

## 2024-07-30 PROCEDURE — 36415 COLL VENOUS BLD VENIPUNCTURE: CPT

## 2024-07-31 LAB — ESTRADIOL SERPL-MCNC: 38.7 PG/ML

## 2025-01-10 ENCOUNTER — APPOINTMENT (EMERGENCY)
Dept: RADIOLOGY | Facility: HOSPITAL | Age: 45
End: 2025-01-10
Payer: COMMERCIAL

## 2025-01-10 ENCOUNTER — HOSPITAL ENCOUNTER (EMERGENCY)
Facility: HOSPITAL | Age: 45
Discharge: HOME | End: 2025-01-10
Attending: EMERGENCY MEDICINE
Payer: COMMERCIAL

## 2025-01-10 ENCOUNTER — OFFICE VISIT (OUTPATIENT)
Dept: URGENT CARE | Facility: CLINIC | Age: 45
End: 2025-01-10
Payer: COMMERCIAL

## 2025-01-10 VITALS
TEMPERATURE: 98.2 F | HEART RATE: 80 BPM | OXYGEN SATURATION: 100 % | RESPIRATION RATE: 16 BRPM | SYSTOLIC BLOOD PRESSURE: 106 MMHG | DIASTOLIC BLOOD PRESSURE: 65 MMHG

## 2025-01-10 VITALS
SYSTOLIC BLOOD PRESSURE: 104 MMHG | TEMPERATURE: 97.1 F | OXYGEN SATURATION: 98 % | HEART RATE: 71 BPM | DIASTOLIC BLOOD PRESSURE: 68 MMHG | BODY MASS INDEX: 31.86 KG/M2 | RESPIRATION RATE: 18 BRPM | WEIGHT: 185.6 LBS

## 2025-01-10 DIAGNOSIS — M51.26 LUMBAR DISC HERNIATION: ICD-10-CM

## 2025-01-10 DIAGNOSIS — R30.0 DYSURIA: ICD-10-CM

## 2025-01-10 DIAGNOSIS — R10.9 FLANK PAIN: Primary | ICD-10-CM

## 2025-01-10 DIAGNOSIS — R31.9 HEMATURIA, UNSPECIFIED TYPE: Primary | ICD-10-CM

## 2025-01-10 LAB
ALBUMIN SERPL BCG-MCNC: 4.5 G/DL (ref 3.5–5)
ALP SERPL-CCNC: 34 U/L (ref 34–104)
ALT SERPL W P-5'-P-CCNC: 14 U/L (ref 7–52)
ANION GAP SERPL CALCULATED.3IONS-SCNC: 6 MMOL/L (ref 4–13)
AST SERPL W P-5'-P-CCNC: 13 U/L (ref 13–39)
BACTERIA UR QL AUTO: NORMAL /HPF
BASOPHILS # BLD AUTO: 0.05 THOUSANDS/ΜL (ref 0–0.1)
BASOPHILS NFR BLD AUTO: 1 % (ref 0–1)
BILIRUB SERPL-MCNC: 0.29 MG/DL (ref 0.2–1)
BILIRUB UR QL STRIP: NEGATIVE
BUN SERPL-MCNC: 14 MG/DL (ref 5–25)
CALCIUM SERPL-MCNC: 8.9 MG/DL (ref 8.4–10.2)
CHLORIDE SERPL-SCNC: 104 MMOL/L (ref 96–108)
CLARITY UR: CLEAR
CO2 SERPL-SCNC: 27 MMOL/L (ref 21–32)
COLOR UR: YELLOW
CREAT SERPL-MCNC: 0.81 MG/DL (ref 0.6–1.3)
EOSINOPHIL # BLD AUTO: 0.09 THOUSAND/ΜL (ref 0–0.61)
EOSINOPHIL NFR BLD AUTO: 2 % (ref 0–6)
ERYTHROCYTE [DISTWIDTH] IN BLOOD BY AUTOMATED COUNT: 11.7 % (ref 11.6–15.1)
EXT PREGNANCY TEST URINE: NEGATIVE
EXT. CONTROL: NORMAL
GFR SERPL CREATININE-BSD FRML MDRD: 87 ML/MIN/1.73SQ M
GLUCOSE SERPL-MCNC: 109 MG/DL (ref 65–140)
GLUCOSE UR STRIP-MCNC: NEGATIVE MG/DL
HCT VFR BLD AUTO: 37.5 % (ref 34.8–46.1)
HGB BLD-MCNC: 12.7 G/DL (ref 11.5–15.4)
HGB UR QL STRIP.AUTO: ABNORMAL
IMM GRANULOCYTES # BLD AUTO: 0.01 THOUSAND/UL (ref 0–0.2)
IMM GRANULOCYTES NFR BLD AUTO: 0 % (ref 0–2)
KETONES UR STRIP-MCNC: NEGATIVE MG/DL
LEUKOCYTE ESTERASE UR QL STRIP: NEGATIVE
LYMPHOCYTES # BLD AUTO: 1.98 THOUSANDS/ΜL (ref 0.6–4.47)
LYMPHOCYTES NFR BLD AUTO: 42 % (ref 14–44)
MCH RBC QN AUTO: 29 PG (ref 26.8–34.3)
MCHC RBC AUTO-ENTMCNC: 33.9 G/DL (ref 31.4–37.4)
MCV RBC AUTO: 86 FL (ref 82–98)
MONOCYTES # BLD AUTO: 0.3 THOUSAND/ΜL (ref 0.17–1.22)
MONOCYTES NFR BLD AUTO: 6 % (ref 4–12)
NEUTROPHILS # BLD AUTO: 2.32 THOUSANDS/ΜL (ref 1.85–7.62)
NEUTS SEG NFR BLD AUTO: 49 % (ref 43–75)
NITRITE UR QL STRIP: NEGATIVE
NON-SQ EPI CELLS URNS QL MICRO: NORMAL /HPF
NRBC BLD AUTO-RTO: 0 /100 WBCS
PH UR STRIP.AUTO: 7 [PH] (ref 4.5–8)
PLATELET # BLD AUTO: 232 THOUSANDS/UL (ref 149–390)
PMV BLD AUTO: 10.1 FL (ref 8.9–12.7)
POTASSIUM SERPL-SCNC: 3.5 MMOL/L (ref 3.5–5.3)
PROT SERPL-MCNC: 6.5 G/DL (ref 6.4–8.4)
PROT UR STRIP-MCNC: NEGATIVE MG/DL
RBC # BLD AUTO: 4.38 MILLION/UL (ref 3.81–5.12)
RBC #/AREA URNS AUTO: NORMAL /HPF
SL AMB  POCT GLUCOSE, UA: NEGATIVE
SL AMB LEUKOCYTE ESTERASE,UA: NEGATIVE
SL AMB POCT BILIRUBIN,UA: NEGATIVE
SL AMB POCT BLOOD,UA: ABNORMAL
SL AMB POCT CLARITY,UA: CLEAR
SL AMB POCT COLOR,UA: YELLOW
SL AMB POCT KETONES,UA: NEGATIVE
SL AMB POCT NITRITE,UA: NEGATIVE
SL AMB POCT PH,UA: 5
SL AMB POCT SPECIFIC GRAVITY,UA: 1.01
SL AMB POCT URINE PROTEIN: NEGATIVE
SL AMB POCT UROBILINOGEN: 0.2
SODIUM SERPL-SCNC: 137 MMOL/L (ref 135–147)
SP GR UR STRIP.AUTO: <=1.005 (ref 1–1.03)
UROBILINOGEN UR QL STRIP.AUTO: 0.2 E.U./DL
WBC # BLD AUTO: 4.75 THOUSAND/UL (ref 4.31–10.16)
WBC #/AREA URNS AUTO: NORMAL /HPF

## 2025-01-10 PROCEDURE — 80053 COMPREHEN METABOLIC PANEL: CPT

## 2025-01-10 PROCEDURE — 96374 THER/PROPH/DIAG INJ IV PUSH: CPT

## 2025-01-10 PROCEDURE — 74177 CT ABD & PELVIS W/CONTRAST: CPT

## 2025-01-10 PROCEDURE — 99213 OFFICE O/P EST LOW 20 MIN: CPT

## 2025-01-10 PROCEDURE — 81001 URINALYSIS AUTO W/SCOPE: CPT

## 2025-01-10 PROCEDURE — 36415 COLL VENOUS BLD VENIPUNCTURE: CPT

## 2025-01-10 PROCEDURE — 81025 URINE PREGNANCY TEST: CPT

## 2025-01-10 PROCEDURE — 85025 COMPLETE CBC W/AUTO DIFF WBC: CPT

## 2025-01-10 PROCEDURE — 99284 EMERGENCY DEPT VISIT MOD MDM: CPT

## 2025-01-10 PROCEDURE — 81002 URINALYSIS NONAUTO W/O SCOPE: CPT

## 2025-01-10 PROCEDURE — 87086 URINE CULTURE/COLONY COUNT: CPT

## 2025-01-10 RX ORDER — ONDANSETRON 2 MG/ML
4 INJECTION INTRAMUSCULAR; INTRAVENOUS ONCE
Status: COMPLETED | OUTPATIENT
Start: 2025-01-10 | End: 2025-01-10

## 2025-01-10 RX ADMIN — SODIUM CHLORIDE 1000 ML: 0.9 INJECTION, SOLUTION INTRAVENOUS at 17:10

## 2025-01-10 RX ADMIN — ONDANSETRON 4 MG: 2 INJECTION INTRAMUSCULAR; INTRAVENOUS at 17:10

## 2025-01-10 RX ADMIN — IOHEXOL 85 ML: 350 INJECTION, SOLUTION INTRAVENOUS at 16:20

## 2025-01-10 NOTE — PROGRESS NOTES
[facility]        NAME: Neyda Brooks is a 44 y.o. female  : 1980    MRN: [id number]  DATE: January 10, 2025  TIME: 12:35 PM    Assessment and Plan   Hematuria, unspecified type [R31.9]  1. Hematuria, unspecified type        2. Dysuria  POCT urine dip    Urine culture    Urine culture    Transfer to other facility        Patient presents with chills right lower back pain that radiates into her right groin she is in right lower quadrant tenderness.  Blood in her urine large amount.  She does have CVA tenderness on the right.  Patient needs further evaluation for kidney stone    Patient Instructions       Follow up with PCP in 3-5 days.  Proceed to  ER if symptoms worsen.    If tests have been performed at [facility], our office will contact you with results if changes need to be made to the care plan discussed with you at the visit.  You can review your full results on [facility] MyChart.    Chief Complaint     Chief Complaint   Patient presents with    Possible UTI     Started 5 days ago with right lower back pain, also reports painful urination and nausea, reports chills last night, reports some blood in urine         History of Present Illness       This 44-year-old female who presents today with right lower back pain dysuria nausea that started 5 days ago.  Last night she states she had increased nausea chills.  She states last month she had some spotting after her.  And had some dysuria was treated for UTI with Bactrim and then had a urine test done a week later which was negative.  She states Tuesday she just got her period.  She did take Zofran last night that she had at home for her nausea.  She states she has right lower back pain that radiates into her groin nausea and fatigue.  Urine dip in the office today shows large amount of blood.  She does have CVA tenderness.        Review of Systems   Review of Systems   Constitutional:  Positive for chills and fever.   HENT: Negative.      Respiratory: Negative.     Cardiovascular: Negative.    Gastrointestinal:  Positive for abdominal pain and nausea. Negative for blood in stool, constipation, diarrhea and vomiting.   Genitourinary:  Positive for dysuria and flank pain. Negative for menstrual problem, pelvic pain and urgency.   Neurological: Negative.          Current Medications       Current Outpatient Medications:     ALPRAZolam (XANAX) 1 mg tablet, Take 1 mg by mouth 3 (three) times a day, Disp: , Rfl:     amphetamine-dextroamphetamine (ADDERALL XR) 20 MG 24 hr capsule, Take 20 mg by mouth every morning, Disp: , Rfl:     amphetamine-dextroamphetamine (ADDERALL) 10 mg tablet, Take 1-2 tablets by mouth daily (Patient not taking: Reported on 5/1/2023), Disp: , Rfl:     amphetamine-dextroamphetamine (ADDERALL) 15 MG tablet, Take 15 mg by mouth daily at bedtime as needed (Patient not taking: Reported on 5/1/2023), Disp: , Rfl:     Cholecalciferol (VITAMIN D3) 2000 units TABS, Take 2,000 Units by mouth in the morning, Disp: , Rfl:     desvenlafaxine succinate (PRISTIQ) 50 mg 24 hr tablet, Take 1 tablet (50 mg total) by mouth daily, Disp: 30 tablet, Rfl: 0    dexamethasone (DECADRON) 2 mg tablet, Decadron 2 mg 1 tab for 1-5  Days with breakfast for unrelenting migraine in moderation, Disp: 5 tablet, Rfl: 0    fluticasone (FLONASE) 50 mcg/act nasal spray, USE 1 TO 2 SPRAYS IN EACH NOSTRIL EVERY DAY AS NEEDED, Disp: , Rfl:     Galcanezumab-gnlm (Emgality) 120 MG/ML SOAJ, Inject 1 pen subcutaneously every 28 days, Disp: 1 mL, Rfl: 11    ibuprofen (MOTRIN) 800 mg tablet, Take 1 tablet (800 mg total) by mouth every 6 (six) hours as needed for headaches, Disp: 15 tablet, Rfl: 0    Iron-Vitamin C  MG TABS, Take 1 tablet by mouth in the morning, Disp: , Rfl:     Lasmiditan Succinate (REYVOW) 100 MG tablet, Take 1 tablet (100mg)  one time as needed for migraine. Do not use more than one dose per day, or more than 8 doses per month, Disp: 8 tablet, Rfl:  3    Linzess 290 MCG CAPS, Take 1 capsule by mouth daily before breakfast Take 30 minutes before, Disp: , Rfl:     Magnesium Oxide -Mg Supplement 400 MG CAPS, Take 400 mg by mouth Daily at 2am, Disp: , Rfl:     Melatonin 10 MG TABS, Take 10 mg by mouth if needed, Disp: , Rfl:     metoclopramide (Reglan) 10 mg tablet, Take 1 tablet (10 mg total) by mouth 4 (four) times a day, Disp: 20 tablet, Rfl: 6    metoclopramide (REGLAN) 5 mg tablet, , Disp: , Rfl:     metoprolol succinate (TOPROL-XL) 25 mg 24 hr tablet, Take 25 mg by mouth every morning, Disp: , Rfl:     Motegrity 2 MG TABS, Take 1 tablet by mouth daily, Disp: , Rfl:     norethindrone (Melia) 0.35 MG tablet, Take 1 tablet (0.35 mg total) by mouth daily, Disp: 90 tablet, Rfl: 3    Omega-3 Fatty Acids (fish oil) 1,000 mg, Take 1,000 mg by mouth daily, Disp: , Rfl:     omeprazole (PriLOSEC) 20 mg delayed release capsule, TAKE 1 CAPSULE BY MOUTH TWO TIMES A DAY, Disp: , Rfl:     ondansetron (ZOFRAN-ODT) 4 mg disintegrating tablet, Take 2 tablets (8 mg total) by mouth every 8 (eight) hours as needed for nausea or vomiting, Disp: 20 tablet, Rfl: 4    ondansetron (ZOFRAN-ODT) 8 mg disintegrating tablet, DISSOLVE ONE TABLET BY MOUTH EVERY DAY AS NEEDED, Disp: , Rfl:     Riboflavin (B2 PO), Take 1 tablet by mouth in the morning, Disp: , Rfl:     Rimegepant Sulfate (Nurtec) 75 MG TBDP, Take 75 mg by mouth as needed (migraine) Limit of 1 in 24 hours, Disp: 16 tablet, Rfl: 6    rizatriptan (MAXALT) 10 mg tablet, TAKE 1 TABLET BY MOUTH ONCE AS NEEDED FOR MIGRAINE. MAY REPEAT DOSE IN 2 HOURS IF NEEDED. MAX 2/24 HOURS. 9/MONTH, Disp: 9 tablet, Rfl: 6    Suflave 178.7 g SOLR, TAKE 1 KIT AS DIRECTED BY OFFICE FOR PREP, Disp: , Rfl:     topiramate (TOPAMAX) 25 mg tablet, Take 25 mg by mouth daily at bedtime, Disp: , Rfl:     traZODone (DESYREL) 50 mg tablet, Take 1 tablet (50 mg total) by mouth daily at bedtime as needed for sleep May repeat x 1 as needed for sleep, Disp: 30  tablet, Rfl: 1    Current Allergies     Allergies as of 01/10/2025 - Reviewed 01/10/2025   Allergen Reaction Noted    Lyrica [pregabalin] Other (See Comments) 04/12/2017            The following portions of the patient's history were reviewed and updated as appropriate: allergies, current medications, past family history, past medical history, past social history, past surgical history and problem list.     Past Medical History:   Diagnosis Date    Anxiety     BRCA1 negative 2018    Unsure which BRCA she was tested for    BRCA2 negative 2018    Unsure which BRCA she was tested for    Chickenpox     Cluster headache     Depression     Gastroparesis     Headache, tension-type     Migraines        Past Surgical History:   Procedure Laterality Date    BACK SURGERY      LOWER Description: LS-S1 disc surgery    COLONOSCOPY      EGD      ORTHOPEDIC SURGERY      WTE    UT LARGSC EXC VEENA&/STRPG CORDS/EPIGL MCRSCP/TLSCP Left 11/29/2023    Procedure: MICROLARYNGOSCOPY AND EXCISION;  Surgeon: Juan Jose Flynn MD;  Location: [facility];  Service: ENT    WISDOM TOOTH EXTRACTION  08/1999       Family History   Problem Relation Age of Onset    No Known Problems Mother     Hypertension Father     No Known Problems Daughter     Breast cancer Maternal Grandmother         unknown age    Stroke Maternal Grandmother     Cancer Maternal Grandfather         Unknown origin and age    Breast cancer Paternal Grandmother         unknown age    No Known Problems Paternal Grandfather     Cancer Son 4        Neuroblastoma of abdomen    Cancer Son         Neuroblastoma    Ovarian cancer Maternal Aunt         at age 32    Breast cancer Maternal Aunt 32    No Known Problems Maternal Aunt     No Known Problems Maternal Aunt     No Known Problems Maternal Aunt     No Known Problems Maternal Aunt     Breast cancer Paternal Aunt         dx at age 49    No Known Problems Paternal Aunt     Sleep apnea Neg Hx          Medications have been  verified.        Objective   /68 (BP Location: Right arm, Patient Position: Sitting)   Pulse 71   Temp (!) 97.1 °F (36.2 °C) (Tympanic)   Resp 18   Wt 84.2 kg (185 lb 9.6 oz)   LMP 01/08/2025 (Exact Date)   SpO2 98%   BMI 31.86 kg/m²   Patient's last menstrual period was 01/08/2025 (exact date).       Physical Exam     Physical Exam  Vitals reviewed.   Constitutional:       General: She is not in acute distress.     Appearance: Normal appearance. She is not ill-appearing.   HENT:      Head: Normocephalic and atraumatic.   Eyes:      Extraocular Movements: Extraocular movements intact.      Conjunctiva/sclera: Conjunctivae normal.   Pulmonary:      Effort: Pulmonary effort is normal.   Abdominal:      Tenderness: There is abdominal tenderness (R lower quadrant). There is right CVA tenderness. There is no guarding.   Musculoskeletal:         General: Normal range of motion.   Skin:     General: Skin is warm.      Capillary Refill: Capillary refill takes less than 2 seconds.   Neurological:      General: No focal deficit present.      Mental Status: She is alert and oriented to person, place, and time. Mental status is at baseline.   Psychiatric:         Mood and Affect: Mood normal.         Behavior: Behavior normal.         Judgment: Judgment normal.

## 2025-01-10 NOTE — DISCHARGE INSTRUCTIONS
You were seen in the emergency department today for flank pain.    Your testing showed:  Normal appearance of the appendix.  Moderate to large L4-5 central disc herniation. Correlate for lower extremity radiculopathy.  No acute intra-abdominal abnormality. No free air, free fluid, mesenteric inflammatory process, or bowel wall thickening.    Follow up with your primary care provider and OB/GYN regarding your symptoms.   You can also follow up with comprehensive spine if your back pain continues / worsens.     Take Tylenol / Ibuprofen as needed following the instructions on the bottle.     Return to the emergency department for any new or concerning symptoms including worsening pain, if you lose control of your bowel / bladder.     Thank you for choosing  [facility] for your care today.

## 2025-01-10 NOTE — ED PROVIDER NOTES
"  ED Disposition       None          Assessment & Plan   {Hyperlinks  Risk Stratification - NIHSS - HEART SCORE - Fill out sepsis note and make sure you call [id number] if severe or septic shock:[id number]}    Medical Decision Making  Amount and/or Complexity of Data Reviewed  Labs: ordered.  Radiology: ordered.      Patient is a 44 y.o. female with PMH of *** who presents to the ED with ***.    Vital signs ***. On exam ***.    History and physical exam most consistent with ***. However, differential diagnosis included but not limited to ***.     Plan: ***    View ED course for further discussion on patient workup.     On review of previous records ***.    All labs reviewed and utilized in the medical decision making process  All radiology studies independently viewed by me and interpreted by the radiologist.  I reviewed all testing with the patient.     Upon re-evaluation ***.    Disposition: ***    Portions of the record may have been created with voice recognition software. Occasional wrong word or \"sound a like\" substitutions may have occurred due to the inherent limitations of voice recognition software. Read the chart carefully and recognize, using context, where substitutions have occurred.         Medications - No data to display    ED Risk Strat Scores                          SBIRT 22yo+      Flowsheet Row Most Recent Value   Initial Alcohol Screen: US AUDIT-C     1. How often do you have a drink containing alcohol? 0 Filed at: 01/10/2025 1312   2. How many drinks containing alcohol do you have on a typical day you are drinking?  0 Filed at: 01/10/2025 1312   3a. Male UNDER 65: How often do you have five or more drinks on one occasion? 0 Filed at: 01/10/2025 1312   3b. FEMALE Any Age, or MALE 65+: How often do you have 4 or more drinks on one occassion? 0 Filed at: 01/10/2025 1312   Audit-C Score 0 Filed at: 01/10/2025 1312   RENE: How many times in the past year have you...    Used an illegal drug or used a " prescription medication for non-medical reasons? Never Filed at: 01/10/2025 1312                            History of Present Illness   {Hyperlinks  History (Med, Surg, Fam, Social) - Current Medications - Allergies  :[id number]}    Chief Complaint   Patient presents with    Flank Pain     Pt has c/o R flank pain since last night.  Pt is also having nausea.       Past Medical History:   Diagnosis Date    Anxiety     BRCA1 negative 2018    Unsure which BRCA she was tested for    BRCA2 negative 2018    Unsure which BRCA she was tested for    Chickenpox     Cluster headache     Depression     Gastroparesis     Headache, tension-type     Migraines       Past Surgical History:   Procedure Laterality Date    BACK SURGERY      LOWER Description: LS-S1 disc surgery    COLONOSCOPY      EGD      ORTHOPEDIC SURGERY      WTE    MD LARGSC EXC VEENA&/STRPG CORDS/EPIGL MCRSCP/TLSCP Left 11/29/2023    Procedure: MICROLARYNGOSCOPY AND EXCISION;  Surgeon: Juan Jose Flynn MD;  Location: [facility];  Service: ENT    WISDOM TOOTH EXTRACTION  08/1999      Family History   Problem Relation Age of Onset    No Known Problems Mother     Hypertension Father     No Known Problems Daughter     Breast cancer Maternal Grandmother         unknown age    Stroke Maternal Grandmother     Cancer Maternal Grandfather         Unknown origin and age    Breast cancer Paternal Grandmother         unknown age    No Known Problems Paternal Grandfather     Cancer Son 4        Neuroblastoma of abdomen    Cancer Son         Neuroblastoma    Ovarian cancer Maternal Aunt         at age 32    Breast cancer Maternal Aunt 32    No Known Problems Maternal Aunt     No Known Problems Maternal Aunt     No Known Problems Maternal Aunt     No Known Problems Maternal Aunt     Breast cancer Paternal Aunt         dx at age 49    No Known Problems Paternal Aunt     Sleep apnea Neg Hx       Social History     Tobacco Use    Smoking status: Never    Smokeless tobacco:  Never   Vaping Use    Vaping status: Never Used   Substance Use Topics    Alcohol use: No    Drug use: Not Currently     Types: Marijuana     Comment: Have medical marijuana card      E-Cigarette/Vaping    E-Cigarette Use Never User       E-Cigarette/Vaping Substances    Nicotine No     THC Yes     CBD Yes     Flavoring No     Other No     Unknown No       I have reviewed and agree with the history as documented.     HPI    Review of Systems        Objective   {Hyperlinks  Historical Vitals - Historical Labs - Chart Review/Microbiology - Last Echo - Code Status  :[id number]}    ED Triage Vitals [01/10/25 1311]   Temperature Pulse Blood Pressure Respirations SpO2 Patient Position - Orthostatic VS   98.1 °F (36.7 °C) 70 126/90 18 98 % Sitting      Temp Source Heart Rate Source BP Location FiO2 (%) Pain Score    Tympanic Monitor Left arm -- 8      Vitals      Date and Time Temp Pulse SpO2 Resp BP Pain Score FACES Pain Rating User   01/10/25 1429 98.2 °F (36.8 °C) 80 100 % 16 106/65 8 -- AK   01/10/25 1311 98.1 °F (36.7 °C) 70 98 % 18 126/90 8 -- KRR            Physical Exam    Results Reviewed       Procedure Component Value Units Date/Time    Urine Microscopic [[id number]]  (Normal) Collected: 01/10/25 1317    Lab Status: Final result Specimen: Urine Updated: 01/10/25 1350     RBC, UA None Seen /hpf      WBC, UA None Seen /hpf      Epithelial Cells Occasional /hpf      Bacteria, UA None Seen /hpf     Urine Macroscopic, POC [[id number]]  (Abnormal) Collected: 01/10/25 1317    Lab Status: Final result Specimen: Urine Updated: 01/10/25 1318     Color, UA Yellow     Clarity, UA Clear     pH, UA 7.0     Leukocytes, UA Negative     Nitrite, UA Negative     Protein, UA Negative mg/dl      Glucose, UA Negative mg/dl      Ketones, UA Negative mg/dl      Urobilinogen, UA 0.2 E.U./dl      Bilirubin, UA Negative     Occult Blood, UA Small     Specific Gravity, UA <=1.005            No orders to display       POC Renal  US    Date/Time: 1/10/2025 3:12 PM    Performed by: Stormy Harding DO  Authorized by: Stormy Harding DO    Patient location:  ED  Performed by:  Resident  Other Assisting Provider: No    Procedure details:     Exam Type:  Diagnostic    Indications: flank/back pain      Assessment for:  Bladder volume and suspected hydronephrosis    Views obtained: bladder (transverse and sagittal), left kidney and right kidney      Image availability:  Images available in PACS  Findings:     LEFT hydronephrosis: none      RIGHT hydronephrosis: none    Interpretation:     Renal ultrasound impressions: normal exam        ED Medication and Procedure Management   Prior to Admission Medications   Prescriptions Last Dose Informant Patient Reported? Taking?   ALPRAZolam (XANAX) 1 mg tablet  Self Yes No   Sig: Take 1 mg by mouth 3 (three) times a day   Cholecalciferol (VITAMIN D3) 2000 units TABS  Self Yes No   Sig: Take 2,000 Units by mouth in the morning   Galcanezumab-gnlm (Emgality) 120 MG/ML SOAJ  Self No No   Sig: Inject 1 pen subcutaneously every 28 days   Iron-Vitamin C  MG TABS  Self Yes No   Sig: Take 1 tablet by mouth in the morning   Lasmiditan Succinate (REYVOW) 100 MG tablet  Self No No   Sig: Take 1 tablet (100mg)  one time as needed for migraine. Do not use more than one dose per day, or more than 8 doses per month   Linzess 290 MCG CAPS  Self Yes No   Sig: Take 1 capsule by mouth daily before breakfast Take 30 minutes before   Magnesium Oxide -Mg Supplement 400 MG CAPS  Self Yes No   Sig: Take 400 mg by mouth Daily at 2am   Melatonin 10 MG TABS  Self Yes No   Sig: Take 10 mg by mouth if needed   Motegrity 2 MG TABS  Self Yes No   Sig: Take 1 tablet by mouth daily   Omega-3 Fatty Acids (fish oil) 1,000 mg  Self Yes No   Sig: Take 1,000 mg by mouth daily   Riboflavin (B2 PO)  Self Yes No   Sig: Take 1 tablet by mouth in the morning   Rimegepant Sulfate (Nurtec) 75 MG TBDP  Self No No   Sig: Take 75 mg by mouth as  needed (migraine) Limit of 1 in 24 hours   Suflave 178.7 g SOLR  Self Yes No   Sig: TAKE 1 KIT AS DIRECTED BY OFFICE FOR PREP   amphetamine-dextroamphetamine (ADDERALL XR) 20 MG 24 hr capsule  Self Yes No   Sig: Take 20 mg by mouth every morning   amphetamine-dextroamphetamine (ADDERALL) 10 mg tablet  Self Yes No   Sig: Take 1-2 tablets by mouth daily   Patient not taking: Reported on 5/1/2023   amphetamine-dextroamphetamine (ADDERALL) 15 MG tablet  Self Yes No   Sig: Take 15 mg by mouth daily at bedtime as needed   Patient not taking: Reported on 5/1/2023   desvenlafaxine succinate (PRISTIQ) 50 mg 24 hr tablet  Self No No   Sig: Take 1 tablet (50 mg total) by mouth daily   dexamethasone (DECADRON) 2 mg tablet  Self No No   Sig: Decadron 2 mg 1 tab for 1-5  Days with breakfast for unrelenting migraine in moderation   fluticasone (FLONASE) 50 mcg/act nasal spray  Self Yes No   Sig: USE 1 TO 2 SPRAYS IN EACH NOSTRIL EVERY DAY AS NEEDED   ibuprofen (MOTRIN) 800 mg tablet  Self No No   Sig: Take 1 tablet (800 mg total) by mouth every 6 (six) hours as needed for headaches   metoclopramide (REGLAN) 5 mg tablet  Self Yes No   metoclopramide (Reglan) 10 mg tablet  Self No No   Sig: Take 1 tablet (10 mg total) by mouth 4 (four) times a day   metoprolol succinate (TOPROL-XL) 25 mg 24 hr tablet  Self Yes No   Sig: Take 25 mg by mouth every morning   norethindrone (Melia) 0.35 MG tablet   No No   Sig: Take 1 tablet (0.35 mg total) by mouth daily   omeprazole (PriLOSEC) 20 mg delayed release capsule  Self Yes No   Sig: TAKE 1 CAPSULE BY MOUTH TWO TIMES A DAY   ondansetron (ZOFRAN-ODT) 4 mg disintegrating tablet  Self No No   Sig: Take 2 tablets (8 mg total) by mouth every 8 (eight) hours as needed for nausea or vomiting   ondansetron (ZOFRAN-ODT) 8 mg disintegrating tablet  Self Yes No   Sig: DISSOLVE ONE TABLET BY MOUTH EVERY DAY AS NEEDED   rizatriptan (MAXALT) 10 mg tablet  Self No No   Sig: TAKE 1 TABLET BY MOUTH ONCE AS  NEEDED FOR MIGRAINE. MAY REPEAT DOSE IN 2 HOURS IF NEEDED. MAX 2/24 HOURS. 9/MONTH   topiramate (TOPAMAX) 25 mg tablet  Self Yes No   Sig: Take 25 mg by mouth daily at bedtime   traZODone (DESYREL) 50 mg tablet  Self No No   Sig: Take 1 tablet (50 mg total) by mouth daily at bedtime as needed for sleep May repeat x 1 as needed for sleep      Facility-Administered Medications: None     Patient's Medications   Discharge Prescriptions    No medications on file     No discharge procedures on file.  ED SEPSIS DOCUMENTATION          Zachary Miller DO (01/10 1651)      Normal appearance of the appendix.      Moderate to large L4-5 central disc herniation. Correlate for lower extremity radiculopathy.      No acute intra-abdominal abnormality. No free air, free fluid, mesenteric inflammatory process, or bowel wall thickening.         Workstation performed: [id number]             POC Renal US    Date/Time: 1/10/2025 3:12 PM    Performed by: Stormy Harding DO  Authorized by: Stormy Harding DO    Patient location:  ED  Performed by:  Resident  Other Assisting Provider: No    Procedure details:     Exam Type:  Diagnostic    Indications: flank/back pain      Assessment for:  Bladder volume and suspected hydronephrosis    Views obtained: bladder (transverse and sagittal), left kidney and right kidney      Image availability:  Images available in PACS  Findings:     LEFT hydronephrosis: none      RIGHT hydronephrosis: none    Interpretation:     Renal ultrasound impressions: normal exam        ED Medication and Procedure Management   Prior to Admission Medications   Prescriptions Last Dose Informant Patient Reported? Taking?   ALPRAZolam (XANAX) 1 mg tablet  Self Yes No   Sig: Take 1 mg by mouth 3 (three) times a day   Cholecalciferol (VITAMIN D3) 2000 units TABS  Self Yes No   Sig: Take 2,000 Units by mouth in the morning   Galcanezumab-gnlm (Emgality) 120 MG/ML SOAJ  Self No No   Sig: Inject 1 pen subcutaneously every 28 days   Iron-Vitamin C  MG TABS  Self Yes No   Sig: Take 1 tablet by mouth in the morning   Lasmiditan Succinate (REYVOW) 100 MG tablet  Self No No   Sig: Take 1 tablet (100mg)  one time as needed for migraine. Do not use more than one dose per day, or more than 8 doses per month   Linzess 290 MCG CAPS  Self Yes No   Sig: Take 1 capsule by mouth daily before breakfast Take 30 minutes before   Magnesium Oxide -Mg Supplement 400 MG CAPS  Self Yes No   Sig: Take 400 mg by mouth Daily at 2am   Melatonin 10 MG TABS  Self Yes  No   Sig: Take 10 mg by mouth if needed   Motegrity 2 MG TABS  Self Yes No   Sig: Take 1 tablet by mouth daily   Omega-3 Fatty Acids (fish oil) 1,000 mg  Self Yes No   Sig: Take 1,000 mg by mouth daily   Riboflavin (B2 PO)  Self Yes No   Sig: Take 1 tablet by mouth in the morning   Rimegepant Sulfate (Nurtec) 75 MG TBDP  Self No No   Sig: Take 75 mg by mouth as needed (migraine) Limit of 1 in 24 hours   Suflave 178.7 g SOLR  Self Yes No   Sig: TAKE 1 KIT AS DIRECTED BY OFFICE FOR PREP   amphetamine-dextroamphetamine (ADDERALL XR) 20 MG 24 hr capsule  Self Yes No   Sig: Take 20 mg by mouth every morning   amphetamine-dextroamphetamine (ADDERALL) 10 mg tablet  Self Yes No   Sig: Take 1-2 tablets by mouth daily   Patient not taking: Reported on 5/1/2023   amphetamine-dextroamphetamine (ADDERALL) 15 MG tablet  Self Yes No   Sig: Take 15 mg by mouth daily at bedtime as needed   Patient not taking: Reported on 5/1/2023   desvenlafaxine succinate (PRISTIQ) 50 mg 24 hr tablet  Self No No   Sig: Take 1 tablet (50 mg total) by mouth daily   dexamethasone (DECADRON) 2 mg tablet  Self No No   Sig: Decadron 2 mg 1 tab for 1-5  Days with breakfast for unrelenting migraine in moderation   fluticasone (FLONASE) 50 mcg/act nasal spray  Self Yes No   Sig: USE 1 TO 2 SPRAYS IN EACH NOSTRIL EVERY DAY AS NEEDED   ibuprofen (MOTRIN) 800 mg tablet  Self No No   Sig: Take 1 tablet (800 mg total) by mouth every 6 (six) hours as needed for headaches   metoclopramide (REGLAN) 5 mg tablet  Self Yes No   metoclopramide (Reglan) 10 mg tablet  Self No No   Sig: Take 1 tablet (10 mg total) by mouth 4 (four) times a day   metoprolol succinate (TOPROL-XL) 25 mg 24 hr tablet  Self Yes No   Sig: Take 25 mg by mouth every morning   norethindrone (Melia) 0.35 MG tablet   No No   Sig: Take 1 tablet (0.35 mg total) by mouth daily   omeprazole (PriLOSEC) 20 mg delayed release capsule  Self Yes No   Sig: TAKE 1 CAPSULE BY MOUTH TWO TIMES A DAY    ondansetron (ZOFRAN-ODT) 4 mg disintegrating tablet  Self No No   Sig: Take 2 tablets (8 mg total) by mouth every 8 (eight) hours as needed for nausea or vomiting   ondansetron (ZOFRAN-ODT) 8 mg disintegrating tablet  Self Yes No   Sig: DISSOLVE ONE TABLET BY MOUTH EVERY DAY AS NEEDED   rizatriptan (MAXALT) 10 mg tablet  Self No No   Sig: TAKE 1 TABLET BY MOUTH ONCE AS NEEDED FOR MIGRAINE. MAY REPEAT DOSE IN 2 HOURS IF NEEDED. MAX 2/24 HOURS. 9/MONTH   topiramate (TOPAMAX) 25 mg tablet  Self Yes No   Sig: Take 25 mg by mouth daily at bedtime   traZODone (DESYREL) 50 mg tablet  Self No No   Sig: Take 1 tablet (50 mg total) by mouth daily at bedtime as needed for sleep May repeat x 1 as needed for sleep      Facility-Administered Medications: None     Discharge Medication List as of 1/10/2025  4:57 PM        CONTINUE these medications which have NOT CHANGED    Details   ALPRAZolam (XANAX) 1 mg tablet Take 1 mg by mouth 3 (three) times a day, Starting Tue 4/25/2023, Historical Med      amphetamine-dextroamphetamine (ADDERALL XR) 20 MG 24 hr capsule Take 20 mg by mouth every morning, Historical Med      amphetamine-dextroamphetamine (ADDERALL) 10 mg tablet Take 1-2 tablets by mouth daily, Starting Mon 1/23/2023, Historical Med      amphetamine-dextroamphetamine (ADDERALL) 15 MG tablet Take 15 mg by mouth daily at bedtime as needed, Starting Wed 3/16/2022, Historical Med      Cholecalciferol (VITAMIN D3) 2000 units TABS Take 2,000 Units by mouth in the morning, Historical Med      desvenlafaxine succinate (PRISTIQ) 50 mg 24 hr tablet Take 1 tablet (50 mg total) by mouth daily, Starting Mon 5/1/2023, Normal      dexamethasone (DECADRON) 2 mg tablet Decadron 2 mg 1 tab for 1-5  Days with breakfast for unrelenting migraine in moderation, Normal      fluticasone (FLONASE) 50 mcg/act nasal spray USE 1 TO 2 SPRAYS IN EACH NOSTRIL EVERY DAY AS NEEDED, Historical Med      Galcanezumab-gnlm (Emgality) 120 MG/ML SOAJ Inject 1  pen subcutaneously every 28 days, Normal      ibuprofen (MOTRIN) 800 mg tablet Take 1 tablet (800 mg total) by mouth every 6 (six) hours as needed for headaches, Starting Sun 10/24/2021, Normal      Iron-Vitamin C  MG TABS Take 1 tablet by mouth in the morning, Historical Med      Lasmiditan Succinate (REYVOW) 100 MG tablet Take 1 tablet (100mg)  one time as needed for migraine. Do not use more than one dose per day, or more than 8 doses per month, Normal      Linzess 290 MCG CAPS Take 1 capsule by mouth daily before breakfast Take 30 minutes before, Starting Tue 3/7/2023, Historical Med      Magnesium Oxide -Mg Supplement 400 MG CAPS Take 400 mg by mouth Daily at 2am, Historical Med      Melatonin 10 MG TABS Take 10 mg by mouth if needed, Historical Med      !! metoclopramide (Reglan) 10 mg tablet Take 1 tablet (10 mg total) by mouth 4 (four) times a day, Starting Mon 9/26/2022, Normal      !! metoclopramide (REGLAN) 5 mg tablet Historical Med      metoprolol succinate (TOPROL-XL) 25 mg 24 hr tablet Take 25 mg by mouth every morning, Starting Tue 4/25/2023, Historical Med      Motegrity 2 MG TABS Take 1 tablet by mouth daily, Starting Mon 10/23/2023, Historical Med      norethindrone (Melia) 0.35 MG tablet Take 1 tablet (0.35 mg total) by mouth daily, Starting Fri 4/12/2024, Until Thu 7/11/2024, Normal      Omega-3 Fatty Acids (fish oil) 1,000 mg Take 1,000 mg by mouth daily, Historical Med      omeprazole (PriLOSEC) 20 mg delayed release capsule TAKE 1 CAPSULE BY MOUTH TWO TIMES A DAY, Historical Med      !! ondansetron (ZOFRAN-ODT) 4 mg disintegrating tablet Take 2 tablets (8 mg total) by mouth every 8 (eight) hours as needed for nausea or vomiting, Starting Wed 6/15/2022, Normal      !! ondansetron (ZOFRAN-ODT) 8 mg disintegrating tablet DISSOLVE ONE TABLET BY MOUTH EVERY DAY AS NEEDED, Historical Med      Riboflavin (B2 PO) Take 1 tablet by mouth in the morning, Historical Med      Rimegepant Sulfate  (Nurtec) 75 MG TBDP Take 75 mg by mouth as needed (migraine) Limit of 1 in 24 hours, Starting Thu 9/29/2022, Normal      rizatriptan (MAXALT) 10 mg tablet TAKE 1 TABLET BY MOUTH ONCE AS NEEDED FOR MIGRAINE. MAY REPEAT DOSE IN 2 HOURS IF NEEDED. MAX 2/24 HOURS. 9/MONTH, Normal      Suflave 178.7 g SOLR TAKE 1 KIT AS DIRECTED BY OFFICE FOR PREP, Historical Med      topiramate (TOPAMAX) 25 mg tablet Take 25 mg by mouth daily at bedtime, Starting Tue 10/17/2023, Historical Med      traZODone (DESYREL) 50 mg tablet Take 1 tablet (50 mg total) by mouth daily at bedtime as needed for sleep May repeat x 1 as needed for sleep, Starting Mon 5/8/2023, Normal       !! - Potential duplicate medications found. Please discuss with provider.          ED SEPSIS DOCUMENTATION   Time reflects when diagnosis was documented in both MDM as applicable and the Disposition within this note       Time User Action Codes Description Comment    1/10/2025  4:55 PM Stormy Harding [R10.9] Flank pain     1/10/2025  4:56 PM Stormy Harding [M51.26] Lumbar disc herniation                  Stormy Harding DO  01/17/25 1901

## 2025-01-10 NOTE — ED ATTENDING ATTESTATION
1/10/2025  I, Sanket Moyer DO, saw and evaluated the patient. I have discussed the patient with the resident/non-physician practitioner and agree with the resident's/non-physician practitioner's findings, Plan of Care, and MDM as documented in the resident's/non-physician practitioner's note, except where noted. All available labs and Radiology studies were reviewed.  I was present for key portions of any procedure(s) performed by the resident/non-physician practitioner and I was immediately available to provide assistance.       At this point I agree with the current assessment done in the Emergency Department.  I have conducted an independent evaluation of this patient a history and physical is as follows:    44-year-old female presents for evaluation of right-sided lower quadrant/flank pain associated with nausea, chills, hematuria.  LMP was 2 days ago symptoms also occurred 2 days following her previous cycle.  She is never had the symptoms for her cycle length is constant.  She usually bleeds for about 24 hours.  Patient has been treated for UTI twice in the last few weeks first with TMP/SMX and then with levofloxacin.     Impression: Right-sided lower quadrant/flank pain, hematuria differential diagnosis includes stone, infection, endometriosis plan: Repeat UA for infection/stone, metabolic panel, CBC, CT stone study, reassess.  If CT imaging does not show any stone or other pathology symptoms could possibly due to endometriosis recommend GYN follow-up      ED Course         Critical Care Time  Procedures

## 2025-01-11 LAB — BACTERIA UR CULT: NORMAL

## 2025-01-13 ENCOUNTER — NURSE TRIAGE (OUTPATIENT)
Dept: PHYSICAL THERAPY | Facility: OTHER | Age: 45
End: 2025-01-13

## 2025-01-13 DIAGNOSIS — G89.29 CHRONIC RIGHT-SIDED LOW BACK PAIN WITH RIGHT-SIDED SCIATICA: Primary | ICD-10-CM

## 2025-01-13 DIAGNOSIS — M54.41 CHRONIC RIGHT-SIDED LOW BACK PAIN WITH RIGHT-SIDED SCIATICA: Primary | ICD-10-CM
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
## 2025-01-14 NOTE — TELEPHONE ENCOUNTER
Additional Information   Negative: Has the patient had unexplained weight loss?   Negative: Does the patient have a fever?   Negative: Is the patient experiencing urine retention?   Negative: Is the patient experiencing acute drop foot or paralysis?   Negative: Has the patient experienced major trauma? (fall from height, high speed collision, direct blow to spine) and is also experiencing nausea, light-headedness, or loss of consciousness?   Negative: Is the patient experiencing blood in sputum?   Affirmative: Is this a chronic condition?    Protocols used: [facility] Protocol    Patient states the back pain she was is having now is her normal chronic low back pain..    [facility] was reviewed in detail and what we can provide for their back pain.  Patient is agreeable to being triaged and would like to proceed with Physical Therapy.    Referral was placed for Physical Therapy at the [place] site. Patients information was sent to the  to make evaluation appointment. Patient made aware that the PT office  will be calling to schedule the appointment.  Patient was provided with the phone number to the PT office.    No further questions and/or concerns were voiced by the patient at this time. Patient states understanding of the referral that was placed.    Referral Closed.

## 2025-01-15 ENCOUNTER — EVALUATION (OUTPATIENT)
Dept: PHYSICAL THERAPY | Facility: CLINIC | Age: 45
End: 2025-01-15
Payer: COMMERCIAL

## 2025-01-15 DIAGNOSIS — M54.41 CHRONIC RIGHT-SIDED LOW BACK PAIN WITH RIGHT-SIDED SCIATICA: ICD-10-CM

## 2025-01-15 DIAGNOSIS — G89.29 CHRONIC RIGHT-SIDED LOW BACK PAIN WITH RIGHT-SIDED SCIATICA: ICD-10-CM

## 2025-01-15 PROCEDURE — 97161 PT EVAL LOW COMPLEX 20 MIN: CPT | Performed by: PHYSICAL THERAPIST
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
## 2025-01-20 ENCOUNTER — OFFICE VISIT (OUTPATIENT)
Dept: PHYSICAL THERAPY | Facility: CLINIC | Age: 45
End: 2025-01-20
Payer: COMMERCIAL

## 2025-01-20 DIAGNOSIS — G89.29 CHRONIC RIGHT-SIDED LOW BACK PAIN WITH RIGHT-SIDED SCIATICA: Primary | ICD-10-CM

## 2025-01-20 DIAGNOSIS — M54.41 CHRONIC RIGHT-SIDED LOW BACK PAIN WITH RIGHT-SIDED SCIATICA: Primary | ICD-10-CM

## 2025-01-20 PROCEDURE — 97110 THERAPEUTIC EXERCISES: CPT

## 2025-01-20 PROCEDURE — 97140 MANUAL THERAPY 1/> REGIONS: CPT

## 2025-01-20 PROCEDURE — 97112 NEUROMUSCULAR REEDUCATION: CPT

## 2025-01-20 NOTE — PROGRESS NOTES
"Daily Note     Today's date: 2025  Patient name: Neyda Brooks  : 1980  MRN: [id number]  Referring provider: Jaren Minaya, DANGELO  Dx:   Encounter Diagnosis     ICD-10-CM    1. Chronic right-sided low back pain with right-sided sciatica  M54.41     G89.29           Start Time: 1400  Stop Time: 1445  Total time in clinic (min): 45 minutes    Subjective: Patient states that she feels better than LV. Has RLE radicular sxs.      Objective: See treatment diary below      Assessment: Tolerated treatment well. Patient demonstrated fatigue post treatment and would benefit from continued PT. Mod challenge with pelvic stability + core engagement with activities. Good response to tactile cues in supine for pelvic tilt. Added HS stretch 2* to in neural tension on R which dec tightness. Improved lumbo pelvic mobility post lumbar Pas with abolished radicular sxs, but continued with mild L-S stiffness so LTR performed which dec stiffness. Noted soreness with LE extension and pain with STS 2* to dec PPT and core activation. Session end with laser which provided pain relief.       Plan: Continue per plan of care.      Precautions: chronic back pain, prior microdiscectomy 2016, anxiety, depression, chronic migraine      Manuals             Lumbar pas  DL           SIJ MET             DTM R piriformis prn  LQ           Laser prn  6 min 12 W           Neuro Re-Ed             TaA  Adduction  ball 2x10, 5\"           TaA bridge with ball squeeze  X5 Sxs pre Pas and HS S'   No sxs post 2x5             Prone hip ext-may need pillows under hips  5 ea L-S kevin           TaA sit to stand-if no SI symptoms reproduction  5 P! stopped                                                  Ther Ex             Piriformis stretch  R only 15\"x5           Open book stretch  NV           Active HS Stretch  5x3           LTR  10\"x10 eapost PAs                                                               Ther Activity                       "                 Gait Training                                       Modalities

## 2025-01-21 ENCOUNTER — OFFICE VISIT (OUTPATIENT)
Age: 45
End: 2025-01-21
Payer: COMMERCIAL

## 2025-01-21 VITALS
SYSTOLIC BLOOD PRESSURE: 142 MMHG | HEIGHT: 64 IN | BODY MASS INDEX: 31.07 KG/M2 | DIASTOLIC BLOOD PRESSURE: 64 MMHG | WEIGHT: 182 LBS

## 2025-01-21 DIAGNOSIS — R10.31 ABDOMINAL PAIN, RLQ: Primary | ICD-10-CM

## 2025-01-21 PROCEDURE — 99214 OFFICE O/P EST MOD 30 MIN: CPT | Performed by: OBSTETRICS & GYNECOLOGY

## 2025-01-21 RX ORDER — ERENUMAB-AOOE 70 MG/ML
INJECTION SUBCUTANEOUS
COMMUNITY
Start: 2024-10-25

## 2025-01-21 RX ORDER — ESCITALOPRAM OXALATE 10 MG/1
1 TABLET ORAL DAILY
COMMUNITY
Start: 2024-11-01

## 2025-01-22 NOTE — PROGRESS NOTES
Name: Neyda Brooks      : 1980      MRN: [id number]  Encounter Provider: Capri Pennington MD  Encounter Date: 2025   Encounter department: [facility] [place]  :  Assessment & Plan  Abdominal pain, RLQ    Orders:    US pelvis complete w transvaginal; Future    - exam c/w probable right colonic fullness  - sono to rule out any adnexal pathology  - discussed that nerve impingement at L4-L5 might be causing symptoms as well; is already and PT and 4 week reassessment planned      History of Present Illness   HPI  Neyda Brooks is a 44 y.o. female who presents s/p 2 episodes of acute RLQ pain.  She describes the pain as sharp and radiating to back and leg.  She was seen in the ED; urine neg and CT scan showed bulging disc at L4-L5.  Notes that pain seemed to correlate with same phase of cycle.  Still taking micronor.  Has a history significant for gastroparesis and slow transit constipation.    History obtained from: patient    Review of Systems   Constitutional: Negative for chills, fever, malaise/fatigue and night sweats.   Gastrointestinal:  Positive for abdominal pain. Negative for bloating, change in bowel habit and vomiting.   Genitourinary:  Positive for pelvic pain. Negative for dysuria, non-menstrual bleeding and urgency.   Neurological:  Negative for dizziness, headaches, light-headedness and weakness.         Pertinent Medical History     Medical History Reviewed by provider this encounter:     .  Current Outpatient Medications on File Prior to Visit   Medication Sig Dispense Refill    Aimovig 70 MG/ML SOAJ INJECT 70MG UNDER THE SKIN EVERY MONTH- IN THE ABDOMEN, THIGH, OR OUTER AREA OF UPPER ARM      ALPRAZolam (XANAX) 1 mg tablet Take 1 mg by mouth 3 (three) times a day      amphetamine-dextroamphetamine (ADDERALL XR) 20 MG 24 hr capsule Take 20 mg by mouth every morning      Cholecalciferol (VITAMIN D3) 2000 units TABS Take 2,000 Units by mouth in the morning       desvenlafaxine succinate (PRISTIQ) 50 mg 24 hr tablet Take 1 tablet (50 mg total) by mouth daily 30 tablet 0    dexamethasone (DECADRON) 2 mg tablet Decadron 2 mg 1 tab for 1-5  Days with breakfast for unrelenting migraine in moderation 5 tablet 0    escitalopram (LEXAPRO) 10 mg tablet Take 1 tablet by mouth in the morning      fluticasone (FLONASE) 50 mcg/act nasal spray USE 1 TO 2 SPRAYS IN EACH NOSTRIL EVERY DAY AS NEEDED      Iron-Vitamin C  MG TABS Take 1 tablet by mouth in the morning      Linzess 290 MCG CAPS Take 1 capsule by mouth daily before breakfast Take 30 minutes before      Magnesium Oxide -Mg Supplement 400 MG CAPS Take 400 mg by mouth Daily at 2am      Melatonin 10 MG TABS Take 10 mg by mouth if needed      metoclopramide (REGLAN) 5 mg tablet       Motegrity 2 MG TABS Take 1 tablet by mouth daily      Omega-3 Fatty Acids (fish oil) 1,000 mg Take 1,000 mg by mouth daily      omeprazole (PriLOSEC) 20 mg delayed release capsule TAKE 1 CAPSULE BY MOUTH TWO TIMES A DAY      ondansetron (ZOFRAN-ODT) 8 mg disintegrating tablet DISSOLVE ONE TABLET BY MOUTH EVERY DAY AS NEEDED      Riboflavin (B2 PO) Take 1 tablet by mouth in the morning      rizatriptan (MAXALT) 10 mg tablet TAKE 1 TABLET BY MOUTH ONCE AS NEEDED FOR MIGRAINE. MAY REPEAT DOSE IN 2 HOURS IF NEEDED. MAX 2/24 HOURS. 9/MONTH 9 tablet 6    traZODone (DESYREL) 50 mg tablet Take 1 tablet (50 mg total) by mouth daily at bedtime as needed for sleep May repeat x 1 as needed for sleep 30 tablet 1    amphetamine-dextroamphetamine (ADDERALL) 10 mg tablet Take 1-2 tablets by mouth daily (Patient not taking: Reported on 5/1/2023)      amphetamine-dextroamphetamine (ADDERALL) 15 MG tablet Take 15 mg by mouth daily at bedtime as needed (Patient not taking: Reported on 5/1/2023)      Galcanezumab-gnlm (Emgality) 120 MG/ML SOAJ Inject 1 pen subcutaneously every 28 days 1 mL 11    ibuprofen (MOTRIN) 800 mg tablet Take 1 tablet (800 mg total) by mouth  "every 6 (six) hours as needed for headaches 15 tablet 0    Lasmiditan Succinate (REYVOW) 100 MG tablet Take 1 tablet (100mg)  one time as needed for migraine. Do not use more than one dose per day, or more than 8 doses per month 8 tablet 3    metoclopramide (Reglan) 10 mg tablet Take 1 tablet (10 mg total) by mouth 4 (four) times a day 20 tablet 6    metoprolol succinate (TOPROL-XL) 25 mg 24 hr tablet Take 25 mg by mouth every morning      norethindrone (Melia) 0.35 MG tablet Take 1 tablet (0.35 mg total) by mouth daily 90 tablet 3    ondansetron (ZOFRAN-ODT) 4 mg disintegrating tablet Take 2 tablets (8 mg total) by mouth every 8 (eight) hours as needed for nausea or vomiting 20 tablet 4    Rimegepant Sulfate (Nurtec) 75 MG TBDP Take 75 mg by mouth as needed (migraine) Limit of 1 in 24 hours 16 tablet 6    Suflave 178.7 g SOLR TAKE 1 KIT AS DIRECTED BY OFFICE FOR PREP      topiramate (TOPAMAX) 25 mg tablet Take 25 mg by mouth daily at bedtime       No current facility-administered medications on file prior to visit.      Social History     Tobacco Use    Smoking status: Never    Smokeless tobacco: Never   Vaping Use    Vaping status: Never Used   Substance and Sexual Activity    Alcohol use: No    Drug use: Not Currently     Types: Marijuana     Comment: Have medical marijuana card    Sexual activity: Yes     Partners: Male     Birth control/protection: Male Sterilization        Objective   /64 (BP Location: Right arm, Patient Position: Sitting, Cuff Size: Large)   Ht 5' 4\" (1.626 m)   Wt 82.6 kg (182 lb)   LMP 01/08/2025 (Exact Date)   BMI 31.24 kg/m²      Physical Exam  Constitutional:       Appearance: Normal appearance.   Genitourinary:      Vulva, bladder and urethral meatus normal.      No lesions in the vagina.      Right Labia: No Bartholin's cyst.     Left Labia: No Bartholin's cyst.     No inguinal adenopathy present in the right or left side.     No vaginal discharge, erythema or bleeding.      "   Right Adnexa: full and palpable.     Right Adnexa: not tender and no mass present.     Left Adnexa: not tender, not full and no mass present.     No cervical motion tenderness, discharge or friability.      No parametrium nodularity present.     Uterus is not enlarged, tender or prolapsed.      No uterine mass detected.  HENT:      Head: Normocephalic.   Cardiovascular:      Rate and Rhythm: Normal rate and regular rhythm.   Pulmonary:      Effort: Pulmonary effort is normal.   Abdominal:      General: There is no distension.      Palpations: Abdomen is soft.      Tenderness: There is no abdominal tenderness.      Hernia: No hernia is present. There is no hernia in the left inguinal area or right inguinal area.   Musculoskeletal:         General: No swelling.   Lymphadenopathy:      Lower Body: No right inguinal adenopathy. No left inguinal adenopathy.   Neurological:      General: No focal deficit present.      Mental Status: She is alert and oriented to person, place, and time.   Skin:     General: Skin is warm and dry.   Psychiatric:         Mood and Affect: Mood normal.         Behavior: Behavior normal.   Vitals reviewed.      CT abd/pelvis 1/10/2025:  CT ABDOMEN AND PELVIS WITH IV CONTRAST   INDICATION: RLQ abdominal pain / flank pain.   FINDINGS:   ABDOMEN   LOWER CHEST: No clinically significant abnormality in the visualized lower chest.   LIVER/BILIARY TREE: Unremarkable.   GALLBLADDER: No calcified gallstones. No pericholecystic inflammatory change.   SPLEEN: Unremarkable.   PANCREAS: Unremarkable.   ADRENAL GLANDS: Unremarkable.   KIDNEYS/URETERS: Unremarkable. No hydronephrosis.   STOMACH AND BOWEL: Unremarkable.   APPENDIX: Normal.   ABDOMINOPELVIC CAVITY: No ascites. No pneumoperitoneum. No lymphadenopathy.   VESSELS: Unremarkable for patient's age.   PELVIS   REPRODUCTIVE ORGANS: Unremarkable for patient's age.   URINARY BLADDER: Unremarkable.   ABDOMINAL WALL/INGUINAL REGIONS: Unremarkable.    BONES: Moderate to large central disc herniation L4-5 identified. Correlate for lower extremity radiculopathy.   IMPRESSION:   Normal appearance of the appendix.   Moderate to large L4-5 central disc herniation. Correlate for lower extremity radiculopathy.   No acute intra-abdominal abnormality. No free air, free fluid, mesenteric inflammatory process, or bowel wall thickening.

## 2025-01-23 ENCOUNTER — HOSPITAL ENCOUNTER (OUTPATIENT)
Dept: ULTRASOUND IMAGING | Facility: HOSPITAL | Age: 45
Discharge: HOME | End: 2025-01-23
Attending: OBSTETRICS & GYNECOLOGY
Payer: COMMERCIAL

## 2025-01-23 DIAGNOSIS — R10.31 ABDOMINAL PAIN, RLQ: ICD-10-CM

## 2025-01-23 PROCEDURE — 76856 US EXAM PELVIC COMPLETE: CPT

## 2025-01-23 PROCEDURE — 76830 TRANSVAGINAL US NON-OB: CPT

## 2025-01-24 ENCOUNTER — OFFICE VISIT (OUTPATIENT)
Dept: PHYSICAL THERAPY | Facility: CLINIC | Age: 45
End: 2025-01-24
Payer: COMMERCIAL

## 2025-01-24 DIAGNOSIS — M54.41 CHRONIC RIGHT-SIDED LOW BACK PAIN WITH RIGHT-SIDED SCIATICA: Primary | ICD-10-CM

## 2025-01-24 DIAGNOSIS — G89.29 CHRONIC RIGHT-SIDED LOW BACK PAIN WITH RIGHT-SIDED SCIATICA: Primary | ICD-10-CM

## 2025-01-24 PROCEDURE — 97140 MANUAL THERAPY 1/> REGIONS: CPT

## 2025-01-24 PROCEDURE — 97110 THERAPEUTIC EXERCISES: CPT

## 2025-01-24 NOTE — PROGRESS NOTES
"Daily Note     Today's date: 2025  Patient name: Neyda Brooks  : 1980  MRN: [id number]  Referring provider: Jaren Minaya, PT  Dx:   Encounter Diagnosis     ICD-10-CM    1. Chronic right-sided low back pain with right-sided sciatica  M54.41     G89.29           Start Time: 1515  Stop Time: 1602  Total time in clinic (min): 47 minutes      Subjective: Patient reports her back is feeling okay - \"It's mainly my leg that's been hurting this week.\"  Patient reports she had a lot of appointments this week - added she went to OBGYN and had vaginal ultrasound done and feels right sided abdominal pain is aggravated.       Objective: See treatment diary below.      Assessment: Patient has limited tolerance to TE due to back and right leg pain.  Symptoms relieved with long axis distraction.      Plan: Continue treatment as per PT plan of care.       Precautions: chronic back pain, prior microdiscectomy , anxiety, depression, chronic migraine      Manuals            Lumbar pas  DL           SIJ MET             DTM R piriformis prn  LQ JLW          Long axis distraction   JLW          Laser prn  6 min 12 W lumbar protocol  JLW                                    Neuro Re-Ed             TaA  Adduction  ball 2x10, 5\"           TaA bridge with ball squeeze  X5 Sxs pre Pas and HS S'   No sxs post 2x5             Prone hip ext-may need pillows under hips  5 ea L-S sore           TaA sit to stand-if no SI symptoms reproduction  5 P! stopped                                                  Ther Ex             Clamshell in L sidelying   10          Piriformis stretch  R only 15\"x5 manual          Open book stretch  NV           Active HS Stretch  5x3 manual          LTR  10\"x10 eapost PAs 5\"x5 ea                                                              Ther Activity                                       Gait Training                                       Modalities                                     "

## 2025-01-27 ENCOUNTER — RESULTS FOLLOW-UP (OUTPATIENT)
Age: 45
End: 2025-01-27

## 2025-01-27 ENCOUNTER — OFFICE VISIT (OUTPATIENT)
Dept: PHYSICAL THERAPY | Facility: CLINIC | Age: 45
End: 2025-01-27
Payer: COMMERCIAL

## 2025-01-27 DIAGNOSIS — G89.29 CHRONIC RIGHT-SIDED LOW BACK PAIN WITH RIGHT-SIDED SCIATICA: Primary | ICD-10-CM

## 2025-01-27 DIAGNOSIS — M54.41 CHRONIC RIGHT-SIDED LOW BACK PAIN WITH RIGHT-SIDED SCIATICA: Primary | ICD-10-CM

## 2025-01-27 PROCEDURE — 97014 ELECTRIC STIMULATION THERAPY: CPT | Performed by: PHYSICAL THERAPIST

## 2025-01-27 PROCEDURE — G0283 ELEC STIM OTHER THAN WOUND: HCPCS | Performed by: PHYSICAL THERAPIST

## 2025-01-27 PROCEDURE — 97140 MANUAL THERAPY 1/> REGIONS: CPT | Performed by: PHYSICAL THERAPIST

## 2025-01-27 PROCEDURE — 97530 THERAPEUTIC ACTIVITIES: CPT | Performed by: PHYSICAL THERAPIST

## 2025-01-27 NOTE — PROGRESS NOTES
"Daily Note     Today's date: 2025  Patient name: Neyda Brooks  : 1980  MRN: [id number]  Referring provider: Jaren Minaya, PT  Dx:   Encounter Diagnosis     ICD-10-CM    1. Chronic right-sided low back pain with right-sided sciatica  M54.41     G89.29                        Subjective: Pt reports results from OB were neg and was encouraged to continue PT  Reports that she started to experience and excerbation of pain yesterday, possibly after have to sit a cramped back seat of a car. Reports increased anterior hip and radicular pain. Denies any sig mm weakness or worsening of B/B dis function. Reports that she took ibuprofen with min to no pain reduction.        Objective: See treatment diary below.      Assessment: Modified therex as listed. Pt is unable to tolerate prone positioning therofore laser was performed in L sidelying.  No sig change in symptoms with repeated flexion or extension or long axis traction. Pt is comfortable with isometric therex and modalities, provided pt with update hep including isometric therex. Monitor response NV.        Plan: Continue treatment as per PT plan of care.       Precautions: chronic back pain, prior microdiscectomy 2016, anxiety, depression, chronic migraine      Manuals   127         Lumbar pas  DL           SIJ MET             DTM R piriformis prn  LQ JLW          Long axis distraction   JLW          Laser prn  6 min 12 W lumbar protocol  JLW 6'         Glute stretching     kl                      Neuro Re-Ed             TaA  Adduction  ball 2x10, 5\"  5\"x10         TaA bridge with ball squeeze  X5 Sxs pre Pas and HS S'   No sxs post 2x5    No bridge 5\"x10         Prone hip ext-may need pillows under hips  5 ea L-S sore           TaA sit to stand-if no SI symptoms reproduction  5 P! stopped           Glute squeeze    5\"x10                                   Ther Ex             Clamshell in L sidelying   10          Piriformis stretch  R only " "15\"x5 manual          Open book stretch  NV           Active HS Stretch  5x3 manual          LTR  10\"x10 eapost PAs 5\"x5 ea          Extension in sitting    5\"x10                                                Ther Activity                                       Gait Training                                       Modalities             MHP/TENS    Seated 10'                             "

## 2025-01-29 ENCOUNTER — OFFICE VISIT (OUTPATIENT)
Dept: PHYSICAL THERAPY | Facility: CLINIC | Age: 45
End: 2025-01-29
Payer: COMMERCIAL

## 2025-01-29 DIAGNOSIS — G89.29 CHRONIC RIGHT-SIDED LOW BACK PAIN WITH RIGHT-SIDED SCIATICA: Primary | ICD-10-CM

## 2025-01-29 DIAGNOSIS — M54.41 CHRONIC RIGHT-SIDED LOW BACK PAIN WITH RIGHT-SIDED SCIATICA: Primary | ICD-10-CM

## 2025-01-29 PROCEDURE — 97140 MANUAL THERAPY 1/> REGIONS: CPT

## 2025-01-29 PROCEDURE — 97014 ELECTRIC STIMULATION THERAPY: CPT

## 2025-01-29 PROCEDURE — 97112 NEUROMUSCULAR REEDUCATION: CPT

## 2025-01-29 PROCEDURE — G0283 ELEC STIM OTHER THAN WOUND: HCPCS

## 2025-01-29 PROCEDURE — 97110 THERAPEUTIC EXERCISES: CPT

## 2025-01-29 NOTE — PROGRESS NOTES
"Daily Note     Today's date: 2025  Patient name: Neyda Brooks  : 1980  MRN: [id number]  Referring provider: Jaren Minaya, PT  Dx:   Encounter Diagnosis     ICD-10-CM    1. Chronic right-sided low back pain with right-sided sciatica  M54.41     G89.29           Start Time: 1404  Stop Time: 1455  Total time in clinic (min): 51 minutes      Subjective: Patient reports she is feeling better than she did on Monday.  Patient reports pain is no longer radiating down her right leg and it's more localized in her back and right glute.  Patient reports she's been prescribed a muscle relaxer, steroid, and Oxycodone - does not think she will take Oxycodone again due to negative side effects.  Patient reports she is scheduled to have an MRI on 25.      Objective: See treatment diary below.      Assessment: TE program is tolerated well.  Relief reported with modalities.      Plan: Continue treatment as per PT plan of care.       Precautions: chronic back pain, prior microdiscectomy , anxiety, depression, chronic migraine      Manuals          Lumbar pas  DL           SIJ MET             DTM R piriformis prn  LQ JLW          Long axis distraction   JLW          Laser prn  6 min 12 W lumbar protocol  JLW 6' lumbar protocol  JLW        Glute stretching     kl JLW                                  Neuro Re-Ed             TaA  Adduction  ball 2x10, 5\"  5\"x10 5\"x10        TaA bridge with ball squeeze  X5 Sxs pre Pas and HS S'   No sxs post 2x5    No bridge 5\"x10         TaA with ball squeeze     pillow  5\"x10        Prone hip ext-may need pillows under hips  5 ea L-S sore           TaA sit to stand-if no SI symptoms reproduction  5 P! stopped           Glute squeeze    5\"x10 5\"x10                                  Ther Ex             Clamshell in L sidelying   10  15        Piriformis stretch  R only 15\"x5 manual          Open book stretch  NV   10\"x5 ea        Active HS Stretch  5x3 manual   " "       LTR  10\"x10 eapost PAs 5\"x5 ea  5\"x10 ea        Extension in sitting    5\"x10                                                Ther Activity                                       Gait Training                                       Modalities             MHP/TENS    Seated 10' prone  10'                              "

## 2025-01-30 ENCOUNTER — TELEPHONE (OUTPATIENT)
Dept: DERMATOLOGY | Facility: CLINIC | Age: 45
End: 2025-01-30

## 2025-01-30 NOTE — TELEPHONE ENCOUNTER
LMOM that 07/09/25 appt Saira was cx, due to a change in her schedule, please give the office a call to r/s.

## 2025-02-03 ENCOUNTER — OFFICE VISIT (OUTPATIENT)
Dept: PHYSICAL THERAPY | Facility: CLINIC | Age: 45
End: 2025-02-03
Payer: COMMERCIAL

## 2025-02-03 DIAGNOSIS — G89.29 CHRONIC RIGHT-SIDED LOW BACK PAIN WITH RIGHT-SIDED SCIATICA: Primary | ICD-10-CM

## 2025-02-03 DIAGNOSIS — M54.41 CHRONIC RIGHT-SIDED LOW BACK PAIN WITH RIGHT-SIDED SCIATICA: Primary | ICD-10-CM

## 2025-02-03 PROCEDURE — 97014 ELECTRIC STIMULATION THERAPY: CPT

## 2025-02-03 PROCEDURE — 97140 MANUAL THERAPY 1/> REGIONS: CPT

## 2025-02-03 PROCEDURE — G0283 ELEC STIM OTHER THAN WOUND: HCPCS

## 2025-02-03 PROCEDURE — 97110 THERAPEUTIC EXERCISES: CPT

## 2025-02-03 NOTE — PROGRESS NOTES
"Daily Note     Today's date: 2/3/2025  Patient name: Neyda Brooks  : 1980  MRN: [id number]  Referring provider: Jaren Minaya, PT  Dx:   Encounter Diagnosis     ICD-10-CM    1. Chronic right-sided low back pain with right-sided sciatica  M54.41     G89.29           Start Time: 1445  Stop Time: 1539  Total time in clinic (min): 54 minutes      Subjective: Patient reports this past weekend while having sexual intercourse she noticed limited sensation.  Patient reports her right ovary is hurting however she has her period.  Patient reports prescribed muscle relaxer seems to be helping to decrease right leg pain.      Objective: See treatment diary below.      Assessment: Progression of TE program is tolerated well.  Encouraged compliance with HEP as tolerated.      Plan: Continue treatment as per PT plan of care.       Precautions: chronic back pain, prior microdiscectomy , anxiety, depression, chronic migraine, gastroparesis      Manuals  1/20 1/24 1/27 1/29 2/3       Lumbar pas  DL           SIJ MET             DTM R piriformis prn  LQ JLW          Long axis distraction   JLW          Laser prn  6 min 12 W lumbar protocol  JLW 6' lumbar protocol  JLW lumbar protocol  JLW       Glute stretching     kl JLW JLW                                              Neuro Re-Ed             TaA  Adduction  ball 2x10, 5\"  5\"x10 5\"x10 HEP       TaA bridge with ball squeeze  X5 Sxs pre Pas and HS S'   No sxs post 2x5    No bridge 5\"x10  5\"x10       TaA with ball squeeze     pillow  5\"x10        Prone hip ext-may need pillows under hips  5 ea L-S sore           TaA sit to stand-if no SI symptoms reproduction  5 P! stopped           Glute squeeze    5\"x10 5\"x10                                  Ther Ex             Clamshell in L sidelying   10  15        Piriformis stretch  R only 15\"x5 manual          Open book stretch  NV   10\"x5 ea        Active HS Stretch  5x3 manual   manual       LTR  10\"x10 eapost PAs 5\"x5 ea  " "5\"x10 ea 5\"x10 ea       Prone press ups      20       Extension in sitting    5\"x10                                                Ther Activity                                       Gait Training                                       Modalities             MHP/TENS    Seated 10' prone  10' prone  10'                           "

## 2025-02-07 ENCOUNTER — OFFICE VISIT (OUTPATIENT)
Dept: PHYSICAL THERAPY | Facility: CLINIC | Age: 45
End: 2025-02-07
Payer: COMMERCIAL

## 2025-02-07 DIAGNOSIS — G89.29 CHRONIC RIGHT-SIDED LOW BACK PAIN WITH RIGHT-SIDED SCIATICA: Primary | ICD-10-CM

## 2025-02-07 DIAGNOSIS — M54.41 CHRONIC RIGHT-SIDED LOW BACK PAIN WITH RIGHT-SIDED SCIATICA: Primary | ICD-10-CM

## 2025-02-07 PROCEDURE — 97110 THERAPEUTIC EXERCISES: CPT | Performed by: PHYSICAL THERAPIST

## 2025-02-07 PROCEDURE — G0283 ELEC STIM OTHER THAN WOUND: HCPCS | Performed by: PHYSICAL THERAPIST

## 2025-02-07 PROCEDURE — 97014 ELECTRIC STIMULATION THERAPY: CPT | Performed by: PHYSICAL THERAPIST

## 2025-02-07 PROCEDURE — 97140 MANUAL THERAPY 1/> REGIONS: CPT | Performed by: PHYSICAL THERAPIST

## 2025-02-07 PROCEDURE — 97112 NEUROMUSCULAR REEDUCATION: CPT | Performed by: PHYSICAL THERAPIST

## 2025-02-07 NOTE — PROGRESS NOTES
"Daily Note     Today's date: 2025  Patient name: Neyda Brooks  : 1980  MRN: [id number]  Referring provider: Jaren Minaya, PT  Dx:   Encounter Diagnosis     ICD-10-CM    1. Chronic right-sided low back pain with right-sided sciatica  M54.41     G89.29                        Subjective: pt reports that sh feels her back pain is improving but is stil present. Reports that her mm relaxor is helping to reduce her pain and helping her sleep at night.  Reports that she woke up this morning with her R leg achy but reports that she was able sleep through the night and she is usually unable to.    Reports that she is scheduled for MRI this weekend.        Objective: See treatment diary below.      Assessment:progressed therex as listed with no complaints. Pt continues to have pain reduction with modalities. Plan to progress stabilization program as kailey HELM.       Plan: Continue treatment as per PT plan of care.       Precautions: chronic back pain, prior microdiscectomy , anxiety, depression, chronic migraine, gastroparesis      Manuals  1/20 1/24 1/27 1/29 2/3 2/7      Lumbar pas  DL           SIJ MET             DTM R piriformis prn  LQ JLW          Long axis distraction   JLW          Laser prn  6 min 12 W lumbar protocol  JLW 6' lumbar protocol  JLW lumbar protocol  JLW Lumbar protocol      Glute stretching     kl JLW JLW kl                                             Neuro Re-Ed             TaA  Adduction  ball 2x10, 5\"  5\"x10 5\"x10 HEP       TaA bridge with ball squeeze  X5 Sxs pre Pas and HS S'   No sxs post 2x5    No bridge 5\"x10  5\"x10 5\"x20       TaA with ball squeeze     pillow  5\"x10        Prone hip ext-may need pillows under hips  5 ea L-S sore           TaA sit to stand-if no SI symptoms reproduction  5 P! stopped           Glute squeeze    5\"x10 5\"x10        Pball press       5\"x10ea                   Ther Ex             Clamshell in L sidelying   10  15        Piriformis stretch  R only " "15\"x5 manual          Open book stretch  NV   10\"x5 ea        Active HS Stretch  5x3 manual   manual manual      LTR  10\"x10 eapost PAs 5\"x5 ea  5\"x10 ea 5\"x10 ea 5\"x20      Prone press ups      20 x20      Extension in sitting    5\"x10                                                Ther Activity                                       Gait Training                                       Modalities             MHP/TENS    Seated 10' prone  10' prone  10'                           "

## 2025-02-09 ENCOUNTER — HOSPITAL ENCOUNTER (OUTPATIENT)
Dept: MRI IMAGING | Facility: HOSPITAL | Age: 45
Discharge: HOME | End: 2025-02-09
Payer: COMMERCIAL

## 2025-02-09 DIAGNOSIS — M48.062 LUMBAR STENOSIS WITH NEUROGENIC CLAUDICATION: ICD-10-CM

## 2025-02-09 PROCEDURE — 72148 MRI LUMBAR SPINE W/O DYE: CPT

## 2025-02-12 ENCOUNTER — OFFICE VISIT (OUTPATIENT)
Dept: PHYSICAL THERAPY | Facility: CLINIC | Age: 45
End: 2025-02-12
Payer: COMMERCIAL

## 2025-02-12 DIAGNOSIS — G89.29 CHRONIC RIGHT-SIDED LOW BACK PAIN WITH RIGHT-SIDED SCIATICA: Primary | ICD-10-CM

## 2025-02-12 DIAGNOSIS — M54.41 CHRONIC RIGHT-SIDED LOW BACK PAIN WITH RIGHT-SIDED SCIATICA: Primary | ICD-10-CM

## 2025-02-12 PROCEDURE — 97014 ELECTRIC STIMULATION THERAPY: CPT

## 2025-02-12 PROCEDURE — G0283 ELEC STIM OTHER THAN WOUND: HCPCS

## 2025-02-12 PROCEDURE — 97110 THERAPEUTIC EXERCISES: CPT

## 2025-02-12 PROCEDURE — 97140 MANUAL THERAPY 1/> REGIONS: CPT

## 2025-02-12 NOTE — PROGRESS NOTES
"Daily Note     Today's date: 2025  Patient name: Neyda Brooks  : 1980  MRN: [id number]  Referring provider: Jaren Minaya, PT  Dx:   Encounter Diagnosis     ICD-10-CM    1. Chronic right-sided low back pain with right-sided sciatica  M54.41     G89.29           Start Time: 1403  Stop Time: 1456  Total time in clinic (min): 53 minutes      Subjective: Patient reports she is feeling \"pretty good today.\"  Patient reports she was able to obtain a TENS unit for home - \"When I wear it I feel really good.\"  Patient reports sometimes she has difficulty getting into full lumbar extension when performing HEP.      Objective: See treatment diary below.      Assessment: Progression of TE program is tolerated well.  Pain relief reported with modalities.      Plan: Continue treatment as per PT plan of care.       Precautions: chronic back pain, prior microdiscectomy , anxiety, depression, chronic migraine, gastroparesis      Manuals  1/20 1/24 1/27 1/29 2/3 2/7 2/12     Lumbar pas  DL           SIJ MET             DTM R piriformis prn  LQ JLW          Long axis distraction   JLW          Laser prn  6 min 12 W lumbar protocol  JLW 6' lumbar protocol  JLW lumbar protocol  JLW Lumbar protocol lumbar protocol  JLW     Glute stretching     kl JLW JLW kl JLW                                            Neuro Re-Ed             TaA  Adduction  ball 2x10, 5\"  5\"x10 5\"x10 HEP       TaA bridge with ball squeeze  X5 Sxs pre Pas and HS S'   No sxs post 2x5    No bridge 5\"x10  5\"x10 5\"x20  bridge with clam  shell  blue  5\"x20     TaA with ball squeeze     pillow  5\"x10        Prone hip ext-may need pillows under hips  5 ea L-S sore           TaA sit to stand-if no SI symptoms reproduction  5 P! stopped           Glute squeeze    5\"x10 5\"x10        Pball press       5\"x10ea                   Ther Ex             Clamshell in L sidelying   10  15        Piriformis stretch  R only 15\"x5 manual     manual     Open book stretch  " "NV   10\"x5 ea        Active HS Stretch  5x3 manual   manual manual manual     LTR  10\"x10 eapost PAs 5\"x5 ea  5\"x10 ea 5\"x10 ea 5\"x20 5\"x20     Prone press ups      20 x20      Extension in sitting    5\"x10         bike        7'     Sit to stand        10# kb  1x10     leg press        60#  2x10     bosu cross over step up        15 ea                               Ther Activity                                       Gait Training                                       Modalities             MHP/TENS    Seated 10' prone  10' prone  10'  prone  10'                         "

## 2025-02-21 ENCOUNTER — OFFICE VISIT (OUTPATIENT)
Dept: PHYSICAL THERAPY | Facility: CLINIC | Age: 45
End: 2025-02-21
Payer: COMMERCIAL

## 2025-02-21 ENCOUNTER — TELEPHONE (OUTPATIENT)
Age: 45
End: 2025-02-21

## 2025-02-21 ENCOUNTER — APPOINTMENT (OUTPATIENT)
Dept: PHYSICAL THERAPY | Facility: CLINIC | Age: 45
End: 2025-02-21
Payer: COMMERCIAL

## 2025-02-21 DIAGNOSIS — K59.09 OTHER CONSTIPATION: ICD-10-CM

## 2025-02-21 DIAGNOSIS — M62.89 PELVIC FLOOR DYSFUNCTION IN FEMALE: Primary | ICD-10-CM

## 2025-02-21 DIAGNOSIS — N39.3 SUI (STRESS URINARY INCONTINENCE, FEMALE): ICD-10-CM

## 2025-02-21 PROCEDURE — 97163 PT EVAL HIGH COMPLEX 45 MIN: CPT | Performed by: PHYSICAL THERAPIST

## 2025-02-21 PROCEDURE — 97112 NEUROMUSCULAR REEDUCATION: CPT | Performed by: PHYSICAL THERAPIST

## 2025-02-21 NOTE — PROGRESS NOTES
"PT Evaluation     Today's date: 2025  Patient name: Neyda Brooks  : 1980  MRN: [id number]  Referring provider: Ciara Franklin, DANGELO  Dx:   Encounter Diagnosis     ICD-10-CM    1. Pelvic floor dysfunction in female  M62.89       2. GUILLERMO (stress urinary incontinence, female)  N39.3       3. Other constipation  K59.09           Start Time: 0800  Stop Time: 0855  Total time in clinic (min): 55 minutes    Assessment  Impairments: activity intolerance, lacks appropriate home exercise program, pain with function, activity limitations and endurance  Symptom irritability: high  Understanding of Dx/Px/POC: fair     Prognosis: good    Goals  (Up to 8 weeks STG's=LTG's)  1. Independent and safe with PF HEP.  2. Independent and safe with body mechanics and PFM activation to min GUILLERMO with ADL's.  3. Independent and safe with self-postural correction to improve BM with home use of a squatty potty.  4. Independent with bladder/bowel diary to max fluid intake and to avoid bladder/bowels irritants.    Plan  Patient would benefit from: skilled physical therapy          PT Pelvic Floor Subjective:   History of Present Illness:   Pt is 45 y/o female with complex PMHx is currently being treated for acute LBP (due to Hx of herniated disc in L-region) at [facility].. Pt was referred to PF evaluation and treatment by her primary ortho PT due to c/o decreased \"sensation\" with intimacy (first episode on 25) and L groin pain post period. As per pt; she can't feel vaginal or rectal insertion at all. Also pt c/o increase of acute L LE radic to ankle and severe lower abdominal pain after intimacy. After pt takes meds and uses TENS unit on her LB, symptoms subside to \"baseline\", (centralized LBP without radic to LE/or pelvis). Imaging: (-) transvaginal US and (+) LB MRI (b/l mild foraminal narrowing L5-S1 and canal stenosis L5-S1 - see report for details).    Current functional limitations: increased urgency and frequency to urinate, " (+) decreased sensation with intimacy, acute LBP with R LE radic., difficulties with BM (not able to empty on her own, need use of enema/stool softeners/laxatives and manual assist), decreased frequency of BM to only 1x per week - gradual worsening of bowels symptoms in last year, PF symptoms worsen her anxiety, on/off stress urinary incontinence.Date of onset: 1/1/2025          Not a recurrent problem   Quality of life: poor    Social Support:     Lives in:  Multiple-level home    Lives with:  Spouse and young children    Relationship status: /committed    Work status: employed part time    Life stress level: 10 and 9 (pt will reach out to her therapist)    Life stress severity: severe    History of Depression: yesPronouns: she/her  Hand dominance:  Right  Diet and Exercise:      Exercise type: no activity    due to LBP at this time    Not exercising due to: pain  OB/ gyn History    Gestational History:     Prior Pregnancy: Yes      Number of prior pregnancies: 3    Number of term pregnancies: 3    Delivery Type: vaginal delivery      Number of vaginal deliveries: 3 (2000: 8 lbs, 2003: 8 lbs, 2006: 7 lbs)  no delivery complications    Menstrual History:    Date of last menstrual period: 2/1/2025    Menstrual irregularities regular menses    Painful periods:  No difficulty managing menstrual pain    Tolerates tampons: yes    Menopausal: no menopause (poss nany)    Birth control: no contraception  Bladder Function:     Voiding Difficulties positive for: urgency, frequent urination and incomplete emptying      Voiding Difficulties negative for: straining       Voiding Difficulties comments:     Voiding frequency: every 31-60 minutes    Urinary leakage: urine leakage    Urinary leakage aggravated by: coughing, sneezing, exercise and post-void dribble    Nocturia (episodes per night): 1    Painful urination: No      Fluid Intake Type:  Water, tea and diet soda    Intake (ounces): Water intake (oz): 100 oz. Coffee  "intake (oz): on/off. Soda intake (oz): coke \"0\", 1 can per day. Tea intake (oz): on/off green teaa.   Incontinence Management:     Pads/Diaper Use:  None  Bowel Function:     Voiding DIfficulties: stool frequency abnormal and constipation      Voiding DIfficulties comment:  Abdominal discomfort    Bowel Function comments:  Already home use of a squatty potty.    Bowel frequency: more than every 4 days (1x per week, self assist, no sensatiou around her rectum verbalized)    Dent Stool Scale: type 1, type 4 and type 7    Stool softener use: stool softeners    Enema use: enema    Uses \"squatty potty\": Squatty Potty  Sexual Function:     Sexually Active:  Sexually active    Pain during intercourse: No (no sensation with intercourse)      Lubrication Use: Yes    pain causes abstinence    Patient wishes to return to having intercourse: currently unable to have intercourse but wants toSexual function: able to achieve orgasmNo decreased sensation with orgasm.    Also, no sensation with rectal penetration.:  Pain:     Current pain ratin    At best pain ratin    At worst pain ratin    Onset:  More than 2 years ago    Quality:  Radiating and dull ache    Aggravating factors:  [place], bowel movements and prolonged positions    Duration of symptoms:  Does not go away    Relieving factors:  Medications    Progression:  No change  Diagnostic Tests:     MRI studies: abnormal      Ultrasound: normal  Treatments:     Previous treatment:  Physical therapy and medication    Current treatment: medication and physical therapy    Patient Goals:     Patient goals for therapy:  Improved quality of life, fully empty bladder or bowels, improved bladder or bowel function, improved comfort, improved pain management and decreased pain      Objective     Static Posture     Comments  Objective part of PF IE: TBA next tx session.    At the same time pt was advised and recommended to follow up with a neurologist to address her " symptoms to r/o any new changes due to her Hx of LBP and gradual worsening of PF symptoms.  Graphical documentation:     No decreased sensation with orgasm.    Also, no sensation with rectal penetration.             Precautions: not any given (see pt's chart for details PMHx.)      Manuals 2/21            PF MMT next            MIGEL next            Objective part of IE. next                         Neuro Re-Ed                                       WH PT/anatomy/benefits/purpose/edu 5'            Bladder/bowel diary/edu/benefits/purpose 5'                                                   Ther Ex                                                                                                                     Ther Activity                                       Gait Training                                       Modalities

## 2025-03-03 ENCOUNTER — OFFICE VISIT (OUTPATIENT)
Dept: PHYSICAL THERAPY | Facility: CLINIC | Age: 45
End: 2025-03-03
Payer: COMMERCIAL

## 2025-03-03 DIAGNOSIS — K59.09 OTHER CONSTIPATION: ICD-10-CM

## 2025-03-03 DIAGNOSIS — N39.3 SUI (STRESS URINARY INCONTINENCE, FEMALE): ICD-10-CM

## 2025-03-03 DIAGNOSIS — G89.29 CHRONIC RIGHT-SIDED LOW BACK PAIN WITH RIGHT-SIDED SCIATICA: ICD-10-CM

## 2025-03-03 DIAGNOSIS — M62.89 PELVIC FLOOR DYSFUNCTION IN FEMALE: Primary | ICD-10-CM

## 2025-03-03 DIAGNOSIS — M54.41 CHRONIC RIGHT-SIDED LOW BACK PAIN WITH RIGHT-SIDED SCIATICA: ICD-10-CM

## 2025-03-03 PROCEDURE — 97110 THERAPEUTIC EXERCISES: CPT | Performed by: PHYSICAL THERAPIST

## 2025-03-03 PROCEDURE — 97112 NEUROMUSCULAR REEDUCATION: CPT | Performed by: PHYSICAL THERAPIST

## 2025-03-03 PROCEDURE — 97140 MANUAL THERAPY 1/> REGIONS: CPT | Performed by: PHYSICAL THERAPIST

## 2025-03-03 NOTE — PROGRESS NOTES
Daily Note     Today's date: 3/3/2025  Patient name: Neyda Brooks  : 1980  MRN: [id number]  Referring provider: Ciara Franklin, PT  Dx:   Encounter Diagnosis     ICD-10-CM    1. Pelvic floor dysfunction in female  M62.89       2. GUILLERMO (stress urinary incontinence, female)  N39.3       3. Other constipation  K59.09       4. Chronic right-sided low back pain with right-sided sciatica  M54.41     G89.29                      Subjective: No changes in symptoms since IE. Pt is scheduled to see a neurologist next week to address PF sensation symptoms. Today, pt c/o constipation - use of meds, enema, manual assist needed to empty her bowels. (+) increased fluid intake, some on/off UI as well experienced.      Objective: See treatment diary below      Assessment: Tolerated treatment well. Patient demonstrated fatigue post treatment and would benefit from continued PT for further PFM strengthening as kailey. HEP was given and reviewed. Pt will continue to monitor daily bladder/bowel diary to see if improvement is noted.      Plan: Continue per plan of care.  Progress treatment as tolerated.       Precautions: not any given (see pt's chart for details PMHx.)      Manuals  3/3           PF MMT next 3/5 vaginally, 1+/5 anally w decreased prolong hold endurance           MIGEL next 0/2/0           Objective part of IE. next SZ           COLLEEN abdominal massage  next           Neuro Re-Ed                                       WH PT/anatomy/benefits/purpose/edu 5'            Bladder/bowel diary/edu/benefits/purpose 5' 10' reviewed                                                  Ther Ex             Siting abdominal breathing   10x w VC's/TC's           Sitting PF/TA  10x5' VC's                                                                                         Ther Activity             HEP edu/review  given           Bowel/constipation info edu  given           Gait Training                                       Modalities

## 2025-03-11 ENCOUNTER — CONSULT (OUTPATIENT)
Dept: NEUROSURGERY | Facility: CLINIC | Age: 45
End: 2025-03-11
Payer: COMMERCIAL

## 2025-03-11 ENCOUNTER — APPOINTMENT (OUTPATIENT)
Dept: PHYSICAL THERAPY | Facility: CLINIC | Age: 45
End: 2025-03-11
Payer: COMMERCIAL

## 2025-03-11 VITALS
SYSTOLIC BLOOD PRESSURE: 130 MMHG | HEART RATE: 75 BPM | TEMPERATURE: 98.1 F | DIASTOLIC BLOOD PRESSURE: 70 MMHG | HEIGHT: 64 IN | BODY MASS INDEX: 31.07 KG/M2 | OXYGEN SATURATION: 99 % | WEIGHT: 182 LBS

## 2025-03-11 DIAGNOSIS — M51.26 OTHER INTERVERTEBRAL DISC DISPLACEMENT, LUMBAR REGION: Primary | ICD-10-CM

## 2025-03-11 DIAGNOSIS — Z79.899 ENCOUNTER FOR LONG-TERM (CURRENT) USE OF MEDICATIONS: ICD-10-CM

## 2025-03-11 DIAGNOSIS — Z01.812 PRE-OPERATIVE LABORATORY EXAMINATION: ICD-10-CM

## 2025-03-11 DIAGNOSIS — M54.16 LUMBAR RADICULOPATHY: ICD-10-CM

## 2025-03-11 DIAGNOSIS — Z79.01 LONG TERM (CURRENT) USE OF ANTICOAGULANTS: ICD-10-CM

## 2025-03-11 PROCEDURE — 99204 OFFICE O/P NEW MOD 45 MIN: CPT | Performed by: NEUROLOGICAL SURGERY

## 2025-03-11 RX ORDER — METHOCARBAMOL 500 MG/1
500 TABLET, FILM COATED ORAL AS NEEDED
COMMUNITY
Start: 2025-01-28

## 2025-03-11 RX ORDER — CHLORHEXIDINE GLUCONATE ORAL RINSE 1.2 MG/ML
15 SOLUTION DENTAL ONCE
OUTPATIENT
Start: 2025-03-11 | End: 2025-03-11

## 2025-03-11 RX ORDER — OXYCODONE HYDROCHLORIDE 5 MG/1
1 TABLET ORAL 4 TIMES DAILY
COMMUNITY
Start: 2025-01-28

## 2025-03-11 RX ORDER — CEFAZOLIN SODIUM 2 G/50ML
2000 SOLUTION INTRAVENOUS ONCE
OUTPATIENT
Start: 2025-03-11 | End: 2025-03-11
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
## 2025-03-18 ENCOUNTER — CONSULT (OUTPATIENT)
Dept: PAIN MEDICINE | Facility: CLINIC | Age: 45
End: 2025-03-18
Payer: COMMERCIAL

## 2025-03-18 ENCOUNTER — APPOINTMENT (OUTPATIENT)
Dept: PHYSICAL THERAPY | Facility: CLINIC | Age: 45
End: 2025-03-18
Payer: COMMERCIAL

## 2025-03-18 VITALS — HEIGHT: 64 IN | WEIGHT: 188 LBS | HEART RATE: 68 BPM | BODY MASS INDEX: 32.1 KG/M2 | TEMPERATURE: 97.7 F

## 2025-03-18 DIAGNOSIS — M51.26 LUMBAR DISC HERNIATION: ICD-10-CM

## 2025-03-18 DIAGNOSIS — M54.16 LUMBAR RADICULOPATHY: ICD-10-CM

## 2025-03-18 PROBLEM — K58.9 IRRITABLE BOWEL SYNDROME (IBS): Status: ACTIVE | Noted: 2025-03-18

## 2025-03-18 PROBLEM — K31.84 GASTROPARESIS: Status: ACTIVE | Noted: 2025-03-18

## 2025-03-18 PROCEDURE — 99204 OFFICE O/P NEW MOD 45 MIN: CPT | Performed by: ANESTHESIOLOGY

## 2025-03-18 RX ORDER — OMEPRAZOLE 40 MG/1
1 CAPSULE, DELAYED RELEASE ORAL 2 TIMES DAILY
COMMUNITY
Start: 2025-02-24
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
## 2025-03-20 ENCOUNTER — TELEPHONE (OUTPATIENT)
Dept: NEUROSURGERY | Facility: CLINIC | Age: 45
End: 2025-03-20

## 2025-03-20 NOTE — TELEPHONE ENCOUNTER
03/20/2025-Pt called back, scheduled sx w/DKO for 06/25/2025 03/20/2025-Called pt, LMOM to schedule sx. Per DKO 03/11 note, pt may try conservative treatments first.

## 2025-03-26 ENCOUNTER — HOSPITAL ENCOUNTER (OUTPATIENT)
Dept: MAMMOGRAPHY | Facility: IMAGING CENTER | Age: 45
Discharge: HOME | End: 2025-03-26
Payer: COMMERCIAL

## 2025-03-26 VITALS — BODY MASS INDEX: 30.73 KG/M2 | HEIGHT: 64 IN | WEIGHT: 180 LBS

## 2025-03-26 DIAGNOSIS — Z12.31 ENCOUNTER FOR SCREENING MAMMOGRAM FOR MALIGNANT NEOPLASM OF BREAST: ICD-10-CM

## 2025-03-26 PROCEDURE — 77067 SCR MAMMO BI INCL CAD: CPT

## 2025-03-26 PROCEDURE — 77063 BREAST TOMOSYNTHESIS BI: CPT

## 2025-03-28 ENCOUNTER — RESULTS FOLLOW-UP (OUTPATIENT)
Age: 45
End: 2025-03-28

## 2025-03-28 ENCOUNTER — HOSPITAL ENCOUNTER (OUTPATIENT)
Dept: RADIOLOGY | Facility: CLINIC | Age: 45
Discharge: HOME | End: 2025-03-28
Payer: COMMERCIAL

## 2025-03-28 VITALS
RESPIRATION RATE: 18 BRPM | DIASTOLIC BLOOD PRESSURE: 87 MMHG | SYSTOLIC BLOOD PRESSURE: 106 MMHG | TEMPERATURE: 97.7 F | OXYGEN SATURATION: 98 % | HEART RATE: 67 BPM

## 2025-03-28 DIAGNOSIS — M54.16 LUMBAR RADICULOPATHY: ICD-10-CM

## 2025-03-28 DIAGNOSIS — M51.26 LUMBAR DISC HERNIATION: ICD-10-CM

## 2025-03-28 PROCEDURE — 64484 NJX AA&/STRD TFRM EPI L/S EA: CPT | Performed by: ANESTHESIOLOGY

## 2025-03-28 PROCEDURE — 64483 NJX AA&/STRD TFRM EPI L/S 1: CPT | Performed by: ANESTHESIOLOGY

## 2025-03-28 RX ORDER — PAPAVERINE HCL 150 MG
15 CAPSULE, EXTENDED RELEASE ORAL ONCE
Status: COMPLETED | OUTPATIENT
Start: 2025-03-28 | End: 2025-03-28

## 2025-03-28 RX ADMIN — DEXAMETHASONE SODIUM PHOSPHATE 15 MG: 10 INJECTION, SOLUTION INTRAMUSCULAR; INTRAVENOUS at 09:29

## 2025-03-28 RX ADMIN — IOHEXOL 2 ML: 300 INJECTION, SOLUTION INTRAVENOUS at 09:29

## 2025-03-28 NOTE — DISCHARGE INSTR - LAB
Epidural Steroid Injection   WHAT YOU NEED TO KNOW:   An epidural steroid injection (ADELSO) is a procedure to inject steroid medicine into the epidural space. The epidural space is between your spinal cord and vertebrae. Steroids reduce inflammation and fluid buildup in your spine that may be causing pain. You may be given pain medicine along with the steroids.          ACTIVITY  Do not drive or operate machinery today.  No strenuous activity today - bending, lifting, etc.  You may resume normal activites starting tomorrow - start slowly and as tolerated.  You may shower today, but no tub baths or hot tubs.  You may have numbness for several hours from the local anesthetic. Please use caution and common sense, especially with weight-bearing activities.    CARE OF THE INJECTION SITE  If you have soreness or pain, apply ice to the area today (20 minutes on/20 minutes off).  Starting tomorrow, you may use warm, moist heat or ice if needed.  You may have an increase or change in your discomfort for 36-48 hours after your treatment.  Apply ice and continue with any pain medication you have been prescribed.  Notify the [facility] if you have any of the following: redness, drainage, swelling, headache, stiff neck or fever above 100°F.    SPECIAL INSTRUCTIONS  Our office will contact you in approximately 14 days for a progress report.    MEDICATIONS  Continue to take all routine medications.  Our office may have instructed you to hold some medications.    As no general anesthesia was used in today's procedure, you should not experience any side effects related to anesthesia.     If you are diabetic, the steroids used in today's injection may temporarily increase your blood sugar levels after the first few days after your injection. Please keep a close eye on your sugars and alert the doctor who manages your diabetes if your sugars are significantly high from your baseline or you are symptomatic.     If you have a  problem specifically related to your procedure, please call our office at [phone].  Problems not related to your procedure should be directed to your primary care physician.

## 2025-03-28 NOTE — H&P
History of Present Illness: The patient is a 44 y.o. female who presents with complaints of low back and leg pain.    Past Medical History:   Diagnosis Date    Anxiety     BRCA1 negative 2018    Unsure which BRCA she was tested for    BRCA2 negative 2018    Unsure which BRCA she was tested for    Chickenpox     Cluster headache     Depression     Gastroparesis     GERD (gastroesophageal reflux disease)     Headache(784.0) 2009    Migraines…started shortly after son passed away    Headache, tension-type     Low back pain 2016    Had surgery for herniated disc    Lumbosacral disc disease 2016    Migraines        Past Surgical History:   Procedure Laterality Date    BACK SURGERY      LOWER Description: LS-S1 disc surgery    COLONOSCOPY      EGD      ORTHOPEDIC SURGERY      WTE    [place] LARGSC EXC VEENA&/STRPG CORDS/EPIGL MCRSCP/TLSCP Left 11/29/2023    Procedure: MICROLARYNGOSCOPY AND EXCISION;  Surgeon: Juan Jose Flynn MD;  Location[facility];  Service: ENT    WISDOM TOOTH EXTRACTION  08/1999         Current Outpatient Medications:     Aimovig 70 MG/ML SOAJ, INJECT 70MG UNDER THE SKIN EVERY MONTH- IN THE ABDOMEN, THIGH, OR OUTER AREA OF UPPER ARM (Patient not taking: Reported on 3/18/2025), Disp: , Rfl:     ALPRAZolam (XANAX) 1 mg tablet, Take 1 mg by mouth 3 (three) times a day, Disp: , Rfl:     amphetamine-dextroamphetamine (ADDERALL XR) 20 MG 24 hr capsule, Take 20 mg by mouth every morning, Disp: , Rfl:     Cholecalciferol (VITAMIN D3) 2000 units TABS, Take 2,000 Units by mouth in the morning, Disp: , Rfl:     desvenlafaxine succinate (PRISTIQ) 50 mg 24 hr tablet, Take 1 tablet (50 mg total) by mouth daily, Disp: 30 tablet, Rfl: 0    dexamethasone (DECADRON) 2 mg tablet, Decadron 2 mg 1 tab for 1-5  Days with breakfast for unrelenting migraine in moderation (Patient not taking: Reported on 3/18/2025), Disp: 5 tablet, Rfl: 0    escitalopram (LEXAPRO) 10 mg tablet, Take 1 tablet by mouth in the morning, Disp: ,  Rfl:     fluticasone (FLONASE) 50 mcg/act nasal spray, USE 1 TO 2 SPRAYS IN EACH NOSTRIL EVERY DAY AS NEEDED, Disp: , Rfl:     Gentle Laxative 5 MG EC tablet, Take 5 mg by mouth if needed for constipation, Disp: , Rfl:     ibuprofen (MOTRIN) 800 mg tablet, Take 1 tablet (800 mg total) by mouth every 6 (six) hours as needed for headaches, Disp: 15 tablet, Rfl: 0    Iron-Vitamin C  MG TABS, Take 1 tablet by mouth in the morning, Disp: , Rfl:     Linzess 290 MCG CAPS, Take 1 capsule by mouth daily before breakfast Take 30 minutes before, Disp: , Rfl:     Magnesium Oxide -Mg Supplement 400 MG CAPS, Take 400 mg by mouth Daily at 2am, Disp: , Rfl:     Melatonin 10 MG TABS, Take 10 mg by mouth if needed, Disp: , Rfl:     methocarbamol (ROBAXIN) 500 mg tablet, Take 500 mg by mouth if needed (Patient not taking: Reported on 3/18/2025), Disp: , Rfl:     metoclopramide (Reglan) 10 mg tablet, Take 1 tablet (10 mg total) by mouth 4 (four) times a day, Disp: 20 tablet, Rfl: 6    metoclopramide (REGLAN) 5 mg tablet, , Disp: , Rfl:     Motegrity 2 MG TABS, Take 1 tablet by mouth daily, Disp: , Rfl:     Omega-3 Fatty Acids (fish oil) 1,000 mg, Take 1,000 mg by mouth daily, Disp: , Rfl:     omeprazole (PriLOSEC) 20 mg delayed release capsule, TAKE 1 CAPSULE BY MOUTH TWO TIMES A DAY, Disp: , Rfl:     omeprazole (PriLOSEC) 40 MG capsule, Take 1 capsule by mouth 2 (two) times a day, Disp: , Rfl:     ondansetron (ZOFRAN-ODT) 8 mg disintegrating tablet, DISSOLVE ONE TABLET BY MOUTH EVERY DAY AS NEEDED, Disp: , Rfl:     oxyCODONE (ROXICODONE) 5 immediate release tablet, Take 1 tablet by mouth 4 times a day, Disp: , Rfl:     Riboflavin (B2 PO), Take 1 tablet by mouth in the morning, Disp: , Rfl:     Rimegepant Sulfate (Nurtec) 75 MG TBDP, Take 75 mg by mouth as needed (migraine) Limit of 1 in 24 hours, Disp: 16 tablet, Rfl: 6    rizatriptan (MAXALT) 10 mg tablet, TAKE 1 TABLET BY MOUTH ONCE AS NEEDED FOR MIGRAINE. MAY REPEAT DOSE IN 2  HOURS IF NEEDED. MAX 2/24 HOURS. 9/MONTH, Disp: 9 tablet, Rfl: 6    traZODone (DESYREL) 50 mg tablet, Take 1 tablet (50 mg total) by mouth daily at bedtime as needed for sleep May repeat x 1 as needed for sleep, Disp: 30 tablet, Rfl: 1    Current Facility-Administered Medications:     dexamethasone (PF) (DECADRON) injection 15 mg, 15 mg, Epidural, Once, Matt Gunn DO    iohexol (OMNIPAQUE) 300 mg/mL injection 2 mL, 2 mL, Epidural, Once, Matt Gunn DO    Allergies   Allergen Reactions    Lyrica [Pregabalin] Other (See Comments)     Other reaction(s): Suicidal ideation       Physical Exam:   Vitals:    03/28/25 0913   BP: 103/69   Pulse: 84   Resp: 16   Temp: 97.7 °F (36.5 °C)   SpO2: 99%     General: Awake, Alert, Oriented x 3, Mood and affect appropriate  Respiratory: Respirations even and unlabored  Cardiovascular: Peripheral pulses intact; no edema  Musculoskeletal Exam: Decreased range of motion lumbar spine    ASA Score: 2    Patient/Chart Verification  Patient ID Verified: Verbal  ID Band Applied: No  Consents Confirmed: To be obtained in the Procedural area  H&P( within 30 days) Verified: To be obtained in the Procedural area  Interval H&P(within 24 hr) Complete (required for Outpatients and Surgery Admit only): To be obtained in the Procedural area  Allergies Reviewed: Yes  Anticoag/NSAID held?: NA  Currently on antibiotics?: No  Pregnancy denied?: Yes    Assessment:   1. Lumbar disc herniation    2. Lumbar radiculopathy - Right        Plan: right l4 and l5 tfesi

## 2025-03-31 ENCOUNTER — TELEPHONE (OUTPATIENT)
Dept: PAIN MEDICINE | Facility: CLINIC | Age: 45
End: 2025-03-31

## 2025-03-31 ENCOUNTER — TELEPHONE (OUTPATIENT)
Age: 45
End: 2025-03-31

## 2025-03-31 NOTE — TELEPHONE ENCOUNTER
Caller: Jacy from Dr. Shoenberger's office     Doctor: Dr. Gunn    Reason for call: office called to set up Jacy for her procedure. Please call pt to schedule that     Call back#: [phone]

## 2025-03-31 NOTE — TELEPHONE ENCOUNTER
S/w Dr. Shoenberger's office. Per phone agent, Jacy was not aware that the pt had a procedure with [facility] last week. Jacy was hoping to help the pt get scheduled for a procedure. Advised phone agent, the writer did s/w the pt this morning and confirmed, ok to take oral steroid as prescribed by Dr. Shoenberger. Phone agent verbalized understanding and appreciation.

## 2025-03-31 NOTE — TELEPHONE ENCOUNTER
S/w pt, c/o R sided ha, dizzy and nausea over the weekend. Pt stated that the headache is worst if she get up from a sitting, bent over, crouched position. HA is better with lying down. Pt also c/o increased pain in her low back and down her R leg. Pt stated that she went to her pcp's open hours this morning and her bp was high. Confirmed lumbar tfesi of friday. Advised pt, as discussed on Friday, it could take up to 2 weeks to get the full effect of the procedure. The pain may get worse before it gets better. The writer would recommend, rest, ice / heat, medication as directed or prescribed. Drink lots of fluids. Continue to monitor - anticipate it will improve as the pain improves. Per SL, ok to take oral steroids as prescribed by pcp - this may help with the pain, which may help with the bp, which may help with the ha. Advised pt, it may take up to 2 weeks to get the full effect. Please cb if questions / issues arise or if the symptoms get worse. Pt verbalized understanding and appreciation.

## 2025-03-31 NOTE — TELEPHONE ENCOUNTER
Caller: Patient    Doctor/Office: Dr Gunn    Call regarding :  pattient calling after procedure because she is experience pain, headache and nausea     Call was transferred to: triage nurse

## 2025-04-02 ENCOUNTER — OFFICE VISIT (OUTPATIENT)
Dept: GASTROENTEROLOGY | Facility: CLINIC | Age: 45
End: 2025-04-02
Payer: COMMERCIAL

## 2025-04-02 VITALS
SYSTOLIC BLOOD PRESSURE: 110 MMHG | WEIGHT: 185 LBS | DIASTOLIC BLOOD PRESSURE: 76 MMHG | BODY MASS INDEX: 31.58 KG/M2 | HEIGHT: 64 IN

## 2025-04-02 DIAGNOSIS — F90.0 ATTENTION DEFICIT HYPERACTIVITY DISORDER, INATTENTIVE TYPE: ICD-10-CM

## 2025-04-02 DIAGNOSIS — K59.00 CONSTIPATION, UNSPECIFIED CONSTIPATION TYPE: Primary | ICD-10-CM

## 2025-04-02 DIAGNOSIS — K31.84 GASTROPARESIS: ICD-10-CM

## 2025-04-02 DIAGNOSIS — R11.0 NAUSEA: ICD-10-CM

## 2025-04-02 DIAGNOSIS — F32.A ANXIETY AND DEPRESSION: ICD-10-CM

## 2025-04-02 DIAGNOSIS — F41.9 ANXIETY AND DEPRESSION: ICD-10-CM

## 2025-04-02 PROCEDURE — 99204 OFFICE O/P NEW MOD 45 MIN: CPT | Performed by: INTERNAL MEDICINE

## 2025-04-02 RX ORDER — LINACLOTIDE 290 UG/1
290 CAPSULE, GELATIN COATED ORAL
Qty: 30 CAPSULE | Refills: 12 | Status: SHIPPED | OUTPATIENT
Start: 2025-04-02

## 2025-04-02 NOTE — ASSESSMENT & PLAN NOTE
Claims gastroparesis but gastric emptying scan only minimally abnormal.  Suspect some mild medicine induced delayed gastric emptying but not significant idiopathic gastroparesis  -Stop Reglan  -Reviewed gastroparesis diet   -Hope when we get her bowels moving better the upper GI symptoms will improve  -Minimize narcotics

## 2025-04-02 NOTE — PROGRESS NOTES
Name: Neyda Brooks      : 1980      MRN: [id number]  Encounter Provider: Gene Lucia MD  Encounter Date: 2025   Encounter department: [facility]  :  Assessment & Plan  Constipation, unspecified constipation type  Seems to be her major complaint currently.  Upper GI symptoms seem to get better when she moves her bowels.  Has had trials of Linzess, Motegrity but unclear if helped or why stopped.  -Start Linzess 290 mg daily  -Start MiraLAX 2 capfuls daily and titrate to adequate bowel movements  Orders:    Linzess 290 MCG CAPS; Take 1 capsule by mouth daily before breakfast Take 30 minutes before    Nausea  Claims gastroparesis but gastric emptying scan only minimally abnormal.  Suspect some mild medicine induced delayed gastric emptying but not significant idiopathic gastroparesis  -Stop Reglan  -Reviewed gastroparesis diet   -Hope when we get her bowels moving better the upper GI symptoms will improve  -Minimize narcotics       Gastroparesis  As above        Anxiety and depression  Contributing to her symptoms       Attention deficit hyperactivity disorder, inattentive type  Contributing to her symptoms           History of Present Illness   Neyda Brooks is a 44 y.o. female who presents for evaluation of her GI symptoms.  She reports over the last several years she has had problems with nausea and anorexia.  Initially she was having issues with diarrhea but now more issues with constipation.  She reports abdominal bloating when she feels constipated.  She uses multiple laxatives on Friday to have a good bowel movement on Saturday.  This resolves her GI symptoms but over the course of the week she does not move her bowels until she takes her bowel regimen.  She has been evaluated at [facility] and a workup has included a negative EGD in .  A gastric emptying scan which showed mild delayed gastric emptying at 4 hours but normal gastric emptying the  first and second hour.  A CAT scan was normal in 2023.  A colonoscopy was normal in 2023 as well.  She has had trials of Montag ready, Linzess, MiraLAX, Reglan, and omeprazole.  Out of frustration the lack of progress and diagnosis she decided to come in for second opinion.  She reports nausea but denies any vomiting.  She denies any heartburn or indigestion.  She denies any regurgitation.  She denies any real early satiety.  She denies any dysphagia or odynophagia.  She reports abdominal bloating and gas.  She is having these issues of constipation.  Her weight was initially down but currently she is gaining weight.  She reports significant amount of issues with her herniated disc.  She is taking Robaxin and oxycodone.  HPI  History obtained from: patient and patient's parent  Review of Systems   Constitutional:  Negative for chills and fever.   HENT:  Negative for ear pain and sore throat.    Eyes:  Negative for pain and visual disturbance.   Respiratory:  Negative for cough and shortness of breath.    Cardiovascular:  Negative for chest pain and palpitations.   Gastrointestinal:  Negative for abdominal pain and vomiting.   Genitourinary:  Negative for dysuria and hematuria.   Musculoskeletal:  Positive for back pain. Negative for arthralgias.   Skin:  Negative for color change and rash.   Neurological:  Negative for seizures and syncope.   All other systems reviewed and are negative.   A complete review of systems is negative other than that noted above in the HPI.    Past Medical History   Past Medical History:   Diagnosis Date    Anxiety     BRCA1 negative 2018    Unsure which BRCA she was tested for    BRCA2 negative 2018    Unsure which BRCA she was tested for    Chickenpox     Cluster headache     Depression     Gastroparesis     GERD (gastroesophageal reflux disease)     Headache(784.0) 2009    Migraines…started shortly after son passed away    Headache, tension-type     Low back pain 2016    Had surgery  for herniated disc    Lumbosacral disc disease 2016    Migraines      Past Surgical History:   Procedure Laterality Date    BACK SURGERY      LOWER Description: LS-S1 disc surgery    COLONOSCOPY      EGD      ORTHOPEDIC SURGERY      WTE    MT LARGSC EXC VEENA&/STRPG CORDS/EPIGL MCRSCP/TLSCP Left 11/29/2023    Procedure: MICROLARYNGOSCOPY AND EXCISION;  Surgeon: Juan Jose Flynn MD;  Location: [facility];  Service: ENT    WISDOM TOOTH EXTRACTION  08/1999     Family History   Problem Relation Age of Onset    No Known Problems Mother     Hypertension Father     Depression Father         Rico    No Known Problems Daughter     Breast cancer Maternal Grandmother         unknown age    Stroke Maternal Grandmother     Cancer Maternal Grandfather         Unknown origin and age    Breast cancer Paternal Grandmother         unknown age    No Known Problems Paternal Grandfather     Alcohol abuse Brother     Drug abuse Brother     Cancer Son 4        Neuroblastoma of abdomen    Ovarian cancer Maternal Aunt         at age 32    Breast cancer Maternal Aunt 32    No Known Problems Maternal Aunt     No Known Problems Maternal Aunt     No Known Problems Maternal Aunt     No Known Problems Maternal Aunt     Breast cancer Paternal Aunt         dx at age 49    No Known Problems Paternal Aunt     Sleep apnea Neg Hx       reports that she has never smoked. She has never used smokeless tobacco. She reports current drug use. Frequency: 7.00 times per week. Drugs: Amphetamines, Benzodiazepines, and Oxycodone. She reports that she does not drink alcohol.  Current Outpatient Medications   Medication Instructions    ALPRAZolam (XANAX) 1 mg, 3 times daily    amphetamine-dextroamphetamine (ADDERALL XR) 20 MG 24 hr capsule 20 mg, Every morning    desvenlafaxine succinate (PRISTIQ) 50 mg, Oral, Daily    dexamethasone (DECADRON) 2 mg tablet Decadron 2 mg 1 tab for 1-5  Days with breakfast for unrelenting migraine in moderation    escitalopram  "(LEXAPRO) 10 mg tablet 1 tablet, Daily    fish oil 1,000 mg, Daily    fluticasone (FLONASE) 50 mcg/act nasal spray USE 1 TO 2 SPRAYS IN EACH NOSTRIL EVERY DAY AS NEEDED    Iron-Vitamin C  MG TABS 1 tablet, Daily    Linzess 290 MCG CAPS 1 capsule, Oral, Daily before breakfast, Take 30 minutes before    Magnesium Oxide -Mg Supplement 400 mg, Daily  (0200)    Melatonin 10 mg, As needed    methocarbamol (ROBAXIN) 500 mg, As needed    metoclopramide (REGLAN) 10 mg, Oral, 4 times daily    Nurtec 75 mg, Oral, As needed, Limit of 1 in 24 hours    omeprazole (PriLOSEC) 40 MG capsule 1 capsule, 2 times daily    ondansetron (ZOFRAN-ODT) 8 mg disintegrating tablet DISSOLVE ONE TABLET BY MOUTH EVERY DAY AS NEEDED    oxyCODONE (ROXICODONE) 5 immediate release tablet 1 tablet, 4 times daily    Riboflavin (B2 PO) 1 tablet, Daily    rizatriptan (MAXALT) 10 mg tablet TAKE 1 TABLET BY MOUTH ONCE AS NEEDED FOR MIGRAINE. MAY REPEAT DOSE IN 2 HOURS IF NEEDED. MAX 2/24 HOURS. 9/MONTH    traZODone (DESYREL) 50 mg, Oral, Daily at bedtime PRN, May repeat x 1 as needed for sleep    Vitamin D3 2,000 Units, Daily     Allergies   Allergen Reactions    Lyrica [Pregabalin] Other (See Comments)     Other reaction(s): Suicidal ideation         Objective   /76   Ht 5' 4\" (1.626 m)   Wt 83.9 kg (185 lb)   LMP 02/26/2025 (Approximate)   BMI 31.76 kg/m²     Physical Exam   General appearance: alert, appears stated age and cooperative  Eyes: PERLLA, EOMI, no icterus   Head: Normocephalic, without obvious abnormality, atraumatic  Lungs: clear to auscultation bilaterally  Heart: regular rate and rhythm, S1, S2 normal, no murmur, click, rub or gallop  Abdomen: soft, non-tender; bowel sounds normal; no masses,  no organomegaly  Extremities: extremities normal, atraumatic, no cyanosis or edema  Neurologic: Grossly normal        Lab Results: I personally reviewed relevant lab results. CBC/BMP: No new results in last 24 hours. , LFTs: No new " results in last 24 hours.     Radiology Results Review: I have reviewed radiology reports from [facility] including: Gastric emptying scan, CT abdomen/pelvis, and procedure reports.      Administrative Statements   I have spent a total time of 45 minutes in caring for this patient on the day of the visit/encounter including Instructions for management, Impressions, Reviewing/placing orders in the medical record (including tests, medications, and/or procedures), and Obtaining or reviewing history  .

## 2025-04-11 ENCOUNTER — TELEPHONE (OUTPATIENT)
Dept: OBGYN CLINIC | Facility: HOSPITAL | Age: 45
End: 2025-04-11

## 2025-04-15 NOTE — TELEPHONE ENCOUNTER
Caller: Neyda   Doctor/office: Dr Gunn  CB#: [phone]    % of improvement: 50 %  Pain Scale (1-10): 5

## 2025-04-16 DIAGNOSIS — K59.00 CONSTIPATION, UNSPECIFIED CONSTIPATION TYPE: Primary | ICD-10-CM

## 2025-04-21 ENCOUNTER — ANNUAL EXAM (OUTPATIENT)
Age: 45
End: 2025-04-21
Payer: COMMERCIAL

## 2025-04-21 VITALS
SYSTOLIC BLOOD PRESSURE: 124 MMHG | DIASTOLIC BLOOD PRESSURE: 80 MMHG | HEIGHT: 65 IN | WEIGHT: 182.6 LBS | BODY MASS INDEX: 30.42 KG/M2

## 2025-04-21 DIAGNOSIS — Z12.4 SCREENING FOR CERVICAL CANCER: ICD-10-CM

## 2025-04-21 DIAGNOSIS — Z01.419 ENCOUNTER FOR ANNUAL ROUTINE GYNECOLOGICAL EXAMINATION: Primary | ICD-10-CM

## 2025-04-21 DIAGNOSIS — Z11.51 SCREENING FOR HUMAN PAPILLOMAVIRUS (HPV): ICD-10-CM

## 2025-04-21 DIAGNOSIS — N95.1 PERIMENOPAUSAL SYMPTOMS: ICD-10-CM

## 2025-04-21 DIAGNOSIS — Z12.31 ENCOUNTER FOR SCREENING MAMMOGRAM FOR MALIGNANT NEOPLASM OF BREAST: ICD-10-CM

## 2025-04-21 DIAGNOSIS — R92.333 HETEROGENEOUSLY DENSE TISSUE OF BOTH BREASTS ON MAMMOGRAPHY: ICD-10-CM

## 2025-04-21 PROCEDURE — G0476 HPV COMBO ASSAY CA SCREEN: HCPCS | Performed by: OBSTETRICS & GYNECOLOGY

## 2025-04-21 PROCEDURE — S0612 ANNUAL GYNECOLOGICAL EXAMINA: HCPCS | Performed by: OBSTETRICS & GYNECOLOGY

## 2025-04-21 PROCEDURE — G0145 SCR C/V CYTO,THINLAYER,RESCR: HCPCS | Performed by: OBSTETRICS & GYNECOLOGY

## 2025-04-21 RX ORDER — PROGESTERONE 100 MG/1
1 CAPSULE ORAL
Qty: 90 CAPSULE | Refills: 3 | Status: SHIPPED | OUTPATIENT
Start: 2025-04-21 | End: 2025-04-28

## 2025-04-21 RX ORDER — GALCANEZUMAB 120 MG/ML
INJECTION, SOLUTION SUBCUTANEOUS
COMMUNITY
Start: 2025-04-09

## 2025-04-21 NOTE — PROGRESS NOTES
Assessment/Plan:    1. Encounter for annual routine gynecological examination (Primary)      2. Screening for cervical cancer    - Liquid-based pap, screening    3. Screening for human papillomavirus (HPV)    - Liquid-based pap, screening    4. Encounter for screening mammogram for malignant neoplasm of breast    - Mammo screening bilateral w 3d and cad; Future    5. Heterogeneously dense tissue of both breasts on mammography    - US breast screening bilateral complete (ABUS); Future    6. Perimenopausal symptoms    - Progesterone 100 MG CAPS; Take 1 capsule by mouth daily at bedtime  Dispense: 90 capsule; Refill: 3        Subjective      Neyda Brooks is a 44 y.o. female who presents for annual exam. Periods are regular but she is still struggling with premenstrual mood changes and now notes hot flashes.  She denies any breast, urinary or sexual concerns.    Current contraception: vasectomy  History of abnormal Pap smear: no  Family history of uterine or ovarian cancer: yes  Regular self breast exam: yes  History of abnormal mammogram: no  Family history of breast cancer: yes  History of abnormal lipids: no    Menstrual History:  OB History          3    Para   3    Term   3       0    AB   0    Living   2         SAB   0    IAB   0    Ectopic   0    Multiple   0    Live Births   3           Obstetric Comments   2000 - 39 wks, , male, 7#, [facility], breast fed x 14 months;  age 6 neuroblastoma  2003 - 39 weeks, , male, 8#, [facility], breast fed x 6 months  9/15/2006 - 38 weeks, nvsd, female, 7#, [facility], breast fed x 6 weeks                 Menarche age: 10  Patient's last menstrual period was 2025 (approximate).  Period Cycle (Days): 28  Period Duration (Days): 2-3  Period Pattern: Regular  Menstrual Flow: Heavy, Light (Heavy x1 day)  Dysmenorrhea: (!) Mild    Past Medical History:   Diagnosis Date    Anxiety     BRCA1 negative     Unsure which BRCA she was tested for    BRCA2  "negative 2018    Unsure which BRCA she was tested for    Chickenpox     Cluster headache     Depression     Gastroparesis     GERD (gastroesophageal reflux disease)     Headache(784.0) 2009    Migraines…started shortly after son passed away    Headache, tension-type     Low back pain 2016    Had surgery for herniated disc    Lumbosacral disc disease 2016    Migraines        Family History   Problem Relation Age of Onset    No Known Problems Mother     Hypertension Father     Depression Father         Rico    No Known Problems Daughter     Breast cancer Maternal Grandmother         unknown age    Stroke Maternal Grandmother     Cancer Maternal Grandfather         Unknown origin and age    Breast cancer Paternal Grandmother         unknown age    No Known Problems Paternal Grandfather     Alcohol abuse Brother     Drug abuse Brother     Cancer Son 4        Neuroblastoma of abdomen    Cancer Son         Neuroblastoma    Ovarian cancer Maternal Aunt         at age 32    Breast cancer Maternal Aunt 32    No Known Problems Maternal Aunt     No Known Problems Maternal Aunt     No Known Problems Maternal Aunt     No Known Problems Maternal Aunt     Breast cancer Paternal Aunt         dx at age 49    No Known Problems Paternal Aunt     Sleep apnea Neg Hx        The following portions of the patient's history were reviewed and updated as appropriate: allergies, current medications, past family history, past medical history, past social history, past surgical history, and problem list.    Review of Systems  Pertinent items are noted in HPI.      Objective      /80 (BP Location: Right arm, Patient Position: Sitting, Cuff Size: Large)   Ht 5' 4.5\" (1.638 m)   Wt 82.8 kg (182 lb 9.6 oz)   LMP 03/27/2025 (Approximate)   BMI 30.86 kg/m²     General:   alert and oriented, in no acute distress   Heart:  Breasts: regular rate and rhythm  appear normal, no suspicious masses, no skin or nipple changes or axillary nodes. "   Lungs: Effort normal   Abdomen: soft, non-tender, without masses or organomegaly   Vulva: normal   Vagina: normal mucosa   Cervix: no lesions   Uterus: normal size, mobile, non-tender   Adnexa:  Bladder: normal adnexa and no mass, fullness, tenderness  Non-tender, no prolapse

## 2025-04-24 ENCOUNTER — RESULTS FOLLOW-UP (OUTPATIENT)
Age: 45
End: 2025-04-24

## 2025-04-24 LAB
LAB AP GYN PRIMARY INTERPRETATION: NORMAL
LAB AP LMP: NORMAL
Lab: NORMAL

## 2025-04-28 ENCOUNTER — APPOINTMENT (OUTPATIENT)
Dept: RADIOLOGY | Facility: CLINIC | Age: 45
End: 2025-04-28
Payer: COMMERCIAL

## 2025-04-28 DIAGNOSIS — N95.1 PERIMENOPAUSAL SYMPTOMS: Primary | ICD-10-CM

## 2025-04-28 DIAGNOSIS — K59.00 CONSTIPATION, UNSPECIFIED CONSTIPATION TYPE: Primary | ICD-10-CM

## 2025-04-28 DIAGNOSIS — K59.00 CONSTIPATION, UNSPECIFIED CONSTIPATION TYPE: ICD-10-CM

## 2025-04-28 PROCEDURE — 74022 RADEX COMPL AQT ABD SERIES: CPT

## 2025-04-28 RX ORDER — PROGESTERONE 200 MG/1
1 CAPSULE ORAL
Qty: 90 CAPSULE | Refills: 3 | Status: SHIPPED | OUTPATIENT
Start: 2025-04-28 | End: 2026-04-28

## 2025-04-29 ENCOUNTER — RESULTS FOLLOW-UP (OUTPATIENT)
Dept: GASTROENTEROLOGY | Facility: CLINIC | Age: 45
End: 2025-04-29

## 2025-05-06 ENCOUNTER — TELEPHONE (OUTPATIENT)
Age: 45
End: 2025-05-06

## 2025-05-12 ENCOUNTER — APPOINTMENT (OUTPATIENT)
Dept: LAB | Facility: CLINIC | Age: 45
End: 2025-05-12
Payer: COMMERCIAL

## 2025-05-12 DIAGNOSIS — Z01.812 PRE-OPERATIVE LABORATORY EXAMINATION: ICD-10-CM

## 2025-05-12 DIAGNOSIS — M54.16 LUMBAR RADICULOPATHY: ICD-10-CM

## 2025-05-12 DIAGNOSIS — Z79.01 LONG TERM (CURRENT) USE OF ANTICOAGULANTS: ICD-10-CM

## 2025-05-12 DIAGNOSIS — Z79.899 ENCOUNTER FOR LONG-TERM (CURRENT) USE OF MEDICATIONS: ICD-10-CM

## 2025-05-12 DIAGNOSIS — M51.26 OTHER INTERVERTEBRAL DISC DISPLACEMENT, LUMBAR REGION: ICD-10-CM

## 2025-05-12 LAB
ALBUMIN SERPL BCG-MCNC: 4.3 G/DL (ref 3.5–5)
ALP SERPL-CCNC: 28 U/L (ref 34–104)
ALT SERPL W P-5'-P-CCNC: 20 U/L (ref 7–52)
ANION GAP SERPL CALCULATED.3IONS-SCNC: 7 MMOL/L (ref 4–13)
APTT PPP: 28 SECONDS (ref 23–34)
AST SERPL W P-5'-P-CCNC: 16 U/L (ref 13–39)
B-HCG SERPL-ACNC: <0.6 MIU/ML (ref 0–5)
BACTERIA UR QL AUTO: ABNORMAL /HPF
BASOPHILS # BLD AUTO: 0.04 THOUSANDS/ÂΜL (ref 0–0.1)
BASOPHILS NFR BLD AUTO: 1 % (ref 0–1)
BILIRUB SERPL-MCNC: 0.34 MG/DL (ref 0.2–1)
BILIRUB UR QL STRIP: NEGATIVE
BUN SERPL-MCNC: 13 MG/DL (ref 5–25)
CALCIUM SERPL-MCNC: 8.9 MG/DL (ref 8.4–10.2)
CHLORIDE SERPL-SCNC: 102 MMOL/L (ref 96–108)
CLARITY UR: ABNORMAL
CO2 SERPL-SCNC: 28 MMOL/L (ref 21–32)
COLOR UR: YELLOW
CREAT SERPL-MCNC: 0.76 MG/DL (ref 0.6–1.3)
EOSINOPHIL # BLD AUTO: 0.1 THOUSAND/ÂΜL (ref 0–0.61)
EOSINOPHIL NFR BLD AUTO: 2 % (ref 0–6)
ERYTHROCYTE [DISTWIDTH] IN BLOOD BY AUTOMATED COUNT: 12.3 % (ref 11.6–15.1)
EST. AVERAGE GLUCOSE BLD GHB EST-MCNC: 103 MG/DL
GFR SERPL CREATININE-BSD FRML MDRD: 95 ML/MIN/1.73SQ M
GLUCOSE P FAST SERPL-MCNC: 102 MG/DL (ref 65–99)
GLUCOSE UR STRIP-MCNC: NEGATIVE MG/DL
HBA1C MFR BLD: 5.2 %
HCT VFR BLD AUTO: 42.2 % (ref 34.8–46.1)
HGB BLD-MCNC: 13.7 G/DL (ref 11.5–15.4)
HGB UR QL STRIP.AUTO: ABNORMAL
IMM GRANULOCYTES # BLD AUTO: 0.01 THOUSAND/UL (ref 0–0.2)
IMM GRANULOCYTES NFR BLD AUTO: 0 % (ref 0–2)
INR PPP: 0.85 (ref 0.85–1.19)
KETONES UR STRIP-MCNC: NEGATIVE MG/DL
LEUKOCYTE ESTERASE UR QL STRIP: ABNORMAL
LYMPHOCYTES # BLD AUTO: 1.74 THOUSANDS/ÂΜL (ref 0.6–4.47)
LYMPHOCYTES NFR BLD AUTO: 36 % (ref 14–44)
MCH RBC QN AUTO: 29.1 PG (ref 26.8–34.3)
MCHC RBC AUTO-ENTMCNC: 32.5 G/DL (ref 31.4–37.4)
MCV RBC AUTO: 90 FL (ref 82–98)
MONOCYTES # BLD AUTO: 0.47 THOUSAND/ÂΜL (ref 0.17–1.22)
MONOCYTES NFR BLD AUTO: 10 % (ref 4–12)
NEUTROPHILS # BLD AUTO: 2.46 THOUSANDS/ÂΜL (ref 1.85–7.62)
NEUTS SEG NFR BLD AUTO: 51 % (ref 43–75)
NITRITE UR QL STRIP: NEGATIVE
NON-SQ EPI CELLS URNS QL MICRO: ABNORMAL /HPF
NRBC BLD AUTO-RTO: 0 /100 WBCS
PH UR STRIP.AUTO: 5.5 [PH]
PLATELET # BLD AUTO: 196 THOUSANDS/UL (ref 149–390)
PMV BLD AUTO: 11.5 FL (ref 8.9–12.7)
POTASSIUM SERPL-SCNC: 4 MMOL/L (ref 3.5–5.3)
PROT SERPL-MCNC: 6.4 G/DL (ref 6.4–8.4)
PROT UR STRIP-MCNC: ABNORMAL MG/DL
PROTHROMBIN TIME: 12 SECONDS (ref 12.3–15)
RBC # BLD AUTO: 4.7 MILLION/UL (ref 3.81–5.12)
RBC #/AREA URNS AUTO: ABNORMAL /HPF
SODIUM SERPL-SCNC: 137 MMOL/L (ref 135–147)
SP GR UR STRIP.AUTO: 1.02 (ref 1–1.03)
UROBILINOGEN UR STRIP-ACNC: <2 MG/DL
WBC # BLD AUTO: 4.82 THOUSAND/UL (ref 4.31–10.16)
WBC #/AREA URNS AUTO: ABNORMAL /HPF

## 2025-05-12 PROCEDURE — 84702 CHORIONIC GONADOTROPIN TEST: CPT

## 2025-05-12 PROCEDURE — 80053 COMPREHEN METABOLIC PANEL: CPT

## 2025-05-12 PROCEDURE — 85730 THROMBOPLASTIN TIME PARTIAL: CPT

## 2025-05-12 PROCEDURE — 36415 COLL VENOUS BLD VENIPUNCTURE: CPT

## 2025-05-12 PROCEDURE — 85610 PROTHROMBIN TIME: CPT

## 2025-05-12 PROCEDURE — 83036 HEMOGLOBIN GLYCOSYLATED A1C: CPT

## 2025-05-12 PROCEDURE — 81001 URINALYSIS AUTO W/SCOPE: CPT

## 2025-05-12 PROCEDURE — 85025 COMPLETE CBC W/AUTO DIFF WBC: CPT

## 2025-05-13 ENCOUNTER — OFFICE VISIT (OUTPATIENT)
Dept: URGENT CARE | Facility: CLINIC | Age: 45
End: 2025-05-13
Payer: COMMERCIAL

## 2025-05-13 VITALS
SYSTOLIC BLOOD PRESSURE: 126 MMHG | WEIGHT: 187 LBS | OXYGEN SATURATION: 99 % | TEMPERATURE: 98 F | BODY MASS INDEX: 31.6 KG/M2 | HEART RATE: 86 BPM | DIASTOLIC BLOOD PRESSURE: 80 MMHG | RESPIRATION RATE: 18 BRPM

## 2025-05-13 DIAGNOSIS — M54.50 CHRONIC RIGHT-SIDED LOW BACK PAIN WITHOUT SCIATICA: ICD-10-CM

## 2025-05-13 DIAGNOSIS — N39.0 URINARY TRACT INFECTION WITHOUT HEMATURIA, SITE UNSPECIFIED: Primary | ICD-10-CM

## 2025-05-13 DIAGNOSIS — G89.29 CHRONIC RIGHT-SIDED LOW BACK PAIN WITHOUT SCIATICA: ICD-10-CM

## 2025-05-13 LAB
SL AMB  POCT GLUCOSE, UA: NEGATIVE
SL AMB LEUKOCYTE ESTERASE,UA: NEGATIVE
SL AMB POCT BILIRUBIN,UA: NEGATIVE
SL AMB POCT BLOOD,UA: NEGATIVE
SL AMB POCT CLARITY,UA: CLEAR
SL AMB POCT COLOR,UA: YELLOW
SL AMB POCT KETONES,UA: NEGATIVE
SL AMB POCT NITRITE,UA: NEGATIVE
SL AMB POCT PH,UA: 6
SL AMB POCT SPECIFIC GRAVITY,UA: 1
SL AMB POCT URINE PROTEIN: NEGATIVE
SL AMB POCT UROBILINOGEN: 0.2

## 2025-05-13 PROCEDURE — 99213 OFFICE O/P EST LOW 20 MIN: CPT | Performed by: PHYSICIAN ASSISTANT

## 2025-05-13 PROCEDURE — 81002 URINALYSIS NONAUTO W/O SCOPE: CPT | Performed by: PHYSICIAN ASSISTANT

## 2025-05-13 PROCEDURE — 87086 URINE CULTURE/COLONY COUNT: CPT | Performed by: PHYSICIAN ASSISTANT

## 2025-05-13 RX ORDER — LIDOCAINE 50 MG/G
1 PATCH TOPICAL DAILY
Qty: 15 PATCH | Refills: 0 | Status: SHIPPED | OUTPATIENT
Start: 2025-05-13

## 2025-05-13 RX ORDER — CEPHALEXIN 500 MG/1
500 CAPSULE ORAL EVERY 8 HOURS SCHEDULED
Qty: 30 CAPSULE | Refills: 0 | Status: SHIPPED | OUTPATIENT
Start: 2025-05-13 | End: 2025-05-23

## 2025-05-13 NOTE — PROGRESS NOTES
"[facility]        NAME: Neyda Brooks is a 44 y.o. female  : 1980    MRN: [id number]  DATE: May 13, 2025  TIME: 6:34 PM    /80 (BP Location: Right arm, Patient Position: Sitting)   Pulse 86   Temp 98 °F (36.7 °C) (Tympanic)   Resp 18   Wt 84.8 kg (187 lb)   LMP 2025 (Approximate)   SpO2 99%   BMI 31.60 kg/m²     Assessment and Plan   Urinary tract infection without hematuria, site unspecified [N39.0]  1. Urinary tract infection without hematuria, site unspecified  POCT urine dip    Urine culture    lidocaine (Lidoderm) 5 %    cephalexin (KEFLEX) 500 mg capsule      2. Chronic right-sided low back pain without sciatica  lidocaine (Lidoderm) 5 %    cephalexin (KEFLEX) 500 mg capsule            Patient Instructions       Follow up with PCP in 3-5 days.  Proceed to  ER if symptoms worsen.    Chief Complaint     Chief Complaint   Patient presents with    Nausea     Started \" a long time ago\" with nause, reports chronic nausea related to other symptoms, worse over past 24 hours, also reports low back pain that is different from chronic back pain, has surgery scheduled for next month and had lab work done concerned that urine dip was positive          History of Present Illness       Pt with chronic low back pain and here for urine dip yesterday showing leukocytes for presurgery testing yesterday     Nausea  Associated symptoms include nausea.       Review of Systems   Review of Systems   Constitutional: Negative.    HENT: Negative.     Eyes: Negative.    Respiratory: Negative.     Cardiovascular: Negative.    Gastrointestinal:  Positive for nausea.   Endocrine: Negative.    Genitourinary: Negative.    Musculoskeletal:  Positive for back pain.   Skin: Negative.    Allergic/Immunologic: Negative.    Neurological: Negative.    Hematological: Negative.    Psychiatric/Behavioral: Negative.     All other systems reviewed and are negative.        Current Medications       Current Outpatient " Medications:     cephalexin (KEFLEX) 500 mg capsule, Take 1 capsule (500 mg total) by mouth every 8 (eight) hours for 10 days, Disp: 30 capsule, Rfl: 0    lidocaine (Lidoderm) 5 %, Apply 1 patch topically over 12 hours daily Remove & Discard patch within 12 hours or as directed by MD, Disp: 15 patch, Rfl: 0    ALPRAZolam (XANAX) 1 mg tablet, Take 1 mg by mouth 3 (three) times a day, Disp: , Rfl:     amphetamine-dextroamphetamine (ADDERALL XR) 20 MG 24 hr capsule, Take 20 mg by mouth every morning, Disp: , Rfl:     Cholecalciferol (VITAMIN D3) 2000 units TABS, Take 2,000 Units by mouth in the morning, Disp: , Rfl:     desvenlafaxine succinate (PRISTIQ) 50 mg 24 hr tablet, Take 1 tablet (50 mg total) by mouth daily, Disp: 30 tablet, Rfl: 0    dexamethasone (DECADRON) 2 mg tablet, Decadron 2 mg 1 tab for 1-5  Days with breakfast for unrelenting migraine in moderation, Disp: 5 tablet, Rfl: 0    Emgality 120 MG/ML SOAJ, INJECT 2 PENS BELOW THE SKIN ONCE A MONTH FOR THE FIRST MONTH LOADING DOSE, Disp: , Rfl:     escitalopram (LEXAPRO) 10 mg tablet, Take 1 tablet by mouth in the morning, Disp: , Rfl:     fluticasone (FLONASE) 50 mcg/act nasal spray, USE 1 TO 2 SPRAYS IN EACH NOSTRIL EVERY DAY AS NEEDED, Disp: , Rfl:     Iron-Vitamin C  MG TABS, Take 1 tablet by mouth in the morning, Disp: , Rfl:     linaCLOtide 145 MCG CAPS, Take 1 capsule (145 mcg total) by mouth daily, Disp: 30 capsule, Rfl: 2    Linzess 290 MCG CAPS, Take 1 capsule by mouth daily before breakfast Take 30 minutes before, Disp: 30 capsule, Rfl: 12    Magnesium Oxide -Mg Supplement 400 MG CAPS, Take 400 mg by mouth Daily at 2am, Disp: , Rfl:     Melatonin 10 MG TABS, Take 10 mg by mouth if needed, Disp: , Rfl:     methocarbamol (ROBAXIN) 500 mg tablet, Take 500 mg by mouth if needed, Disp: , Rfl:     metoclopramide (Reglan) 10 mg tablet, Take 1 tablet (10 mg total) by mouth 4 (four) times a day, Disp: 20 tablet, Rfl: 6    Omega-3 Fatty Acids (fish  oil) 1,000 mg, Take 1,000 mg by mouth daily, Disp: , Rfl:     omeprazole (PriLOSEC) 40 MG capsule, Take 1 capsule by mouth 2 (two) times a day, Disp: , Rfl:     ondansetron (ZOFRAN-ODT) 8 mg disintegrating tablet, DISSOLVE ONE TABLET BY MOUTH EVERY DAY AS NEEDED, Disp: , Rfl:     oxyCODONE (ROXICODONE) 5 immediate release tablet, Take 1 tablet by mouth 4 times a day, Disp: , Rfl:     Progesterone 200 MG CAPS, Take 1 capsule by mouth daily at bedtime, Disp: 90 capsule, Rfl: 3    Riboflavin (B2 PO), Take 1 tablet by mouth in the morning, Disp: , Rfl:     Rimegepant Sulfate (Nurtec) 75 MG TBDP, Take 75 mg by mouth as needed (migraine) Limit of 1 in 24 hours, Disp: 16 tablet, Rfl: 6    rizatriptan (MAXALT) 10 mg tablet, TAKE 1 TABLET BY MOUTH ONCE AS NEEDED FOR MIGRAINE. MAY REPEAT DOSE IN 2 HOURS IF NEEDED. MAX 2/24 HOURS. 9/MONTH, Disp: 9 tablet, Rfl: 6    traZODone (DESYREL) 50 mg tablet, Take 1 tablet (50 mg total) by mouth daily at bedtime as needed for sleep May repeat x 1 as needed for sleep, Disp: 30 tablet, Rfl: 1    Current Allergies     Allergies as of 05/13/2025 - Reviewed 05/13/2025   Allergen Reaction Noted    Lyrica [pregabalin] Other (See Comments) 04/12/2017            The following portions of the patient's history were reviewed and updated as appropriate: allergies, current medications, past family history, past medical history, past social history, past surgical history and problem list.     Past Medical History:   Diagnosis Date    Anxiety     BRCA1 negative 2018    Unsure which BRCA she was tested for    BRCA2 negative 2018    Unsure which BRCA she was tested for    Chickenpox     Cluster headache     Depression     Gastroparesis     GERD (gastroesophageal reflux disease)     Headache(784.0) 2009    Migraines…started shortly after son passed away    Headache, tension-type     Low back pain 2016    Had surgery for herniated disc    Lumbosacral disc disease 2016    Migraines        Past Surgical  History:   Procedure Laterality Date    BACK SURGERY      LOWER Description: LS-S1 disc surgery    COLONOSCOPY      EGD      ORTHOPEDIC SURGERY      WTE    NH LARGSC EXC VEENA&/STRPG CORDS/EPIGL MCRSCP/TLSCP Left 11/29/2023    Procedure: MICROLARYNGOSCOPY AND EXCISION;  Surgeon: Juan Jose Flynn MD;  [facility];  Service: ENT    WISDOM TOOTH EXTRACTION  08/1999       Family History   Problem Relation Age of Onset    No Known Problems Mother     Hypertension Father     Depression Father         Rico    No Known Problems Daughter     Breast cancer Maternal Grandmother         unknown age    Stroke Maternal Grandmother     Cancer Maternal Grandfather         Unknown origin and age    Breast cancer Paternal Grandmother         unknown age    No Known Problems Paternal Grandfather     Alcohol abuse Brother     Drug abuse Brother     Cancer Son 4        Neuroblastoma of abdomen    Cancer Son         Neuroblastoma    Ovarian cancer Maternal Aunt         at age 32    Breast cancer Maternal Aunt 32    No Known Problems Maternal Aunt     No Known Problems Maternal Aunt     No Known Problems Maternal Aunt     No Known Problems Maternal Aunt     Breast cancer Paternal Aunt         dx at age 49    No Known Problems Paternal Aunt     Sleep apnea Neg Hx          Medications have been verified.        Objective   /80 (BP Location: Right arm, Patient Position: Sitting)   Pulse 86   Temp 98 °F (36.7 °C) (Tympanic)   Resp 18   Wt 84.8 kg (187 lb)   LMP 03/27/2025 (Approximate)   SpO2 99%   BMI 31.60 kg/m²        Physical Exam     Physical Exam  Vitals and nursing note reviewed.   Constitutional:       Appearance: Normal appearance. She is normal weight.   HENT:      Head: Normocephalic and atraumatic.      Right Ear: Tympanic membrane, ear canal and external ear normal.      Left Ear: Tympanic membrane, ear canal and external ear normal.      Nose: Nose normal.      Mouth/Throat:      Mouth: Mucous membranes  are moist.      Pharynx: Oropharynx is clear.   Eyes:      Extraocular Movements: Extraocular movements intact.      Conjunctiva/sclera: Conjunctivae normal.      Pupils: Pupils are equal, round, and reactive to light.   Cardiovascular:      Rate and Rhythm: Normal rate and regular rhythm.      Pulses: Normal pulses.      Heart sounds: Normal heart sounds.   Pulmonary:      Effort: Pulmonary effort is normal.      Breath sounds: Normal breath sounds.   Abdominal:      General: Bowel sounds are normal.      Palpations: Abdomen is soft.   Musculoskeletal:         General: Normal range of motion.      Cervical back: Normal range of motion and neck supple.      Comments: Right parapinal and right sacoiliac tender no sciatic notch pain  no flank pain    Skin:     General: Skin is warm.      Capillary Refill: Capillary refill takes less than 2 seconds.   Neurological:      Mental Status: She is alert.

## 2025-05-14 LAB — BACTERIA UR CULT: NORMAL

## 2025-05-15 ENCOUNTER — RESULTS FOLLOW-UP (OUTPATIENT)
Dept: URGENT CARE | Facility: CLINIC | Age: 45
End: 2025-05-15

## 2025-05-19 ENCOUNTER — HOSPITAL ENCOUNTER (OUTPATIENT)
Dept: RADIOLOGY | Age: 45
Discharge: HOME | End: 2025-05-19
Attending: OBSTETRICS & GYNECOLOGY
Payer: COMMERCIAL

## 2025-05-19 VITALS — WEIGHT: 187 LBS | BODY MASS INDEX: 31.16 KG/M2 | HEIGHT: 65 IN

## 2025-05-19 DIAGNOSIS — R92.333 HETEROGENEOUSLY DENSE TISSUE OF BOTH BREASTS ON MAMMOGRAPHY: ICD-10-CM

## 2025-05-19 PROCEDURE — 76641 ULTRASOUND BREAST COMPLETE: CPT

## 2025-05-21 ENCOUNTER — HOSPITAL ENCOUNTER (OUTPATIENT)
Dept: ULTRASOUND IMAGING | Facility: CLINIC | Age: 45
Discharge: HOME | End: 2025-05-21
Attending: OBSTETRICS & GYNECOLOGY
Payer: COMMERCIAL

## 2025-05-21 ENCOUNTER — HOSPITAL ENCOUNTER (OUTPATIENT)
Dept: MAMMOGRAPHY | Facility: CLINIC | Age: 45
Discharge: HOME | End: 2025-05-21
Payer: COMMERCIAL

## 2025-05-21 VITALS — SYSTOLIC BLOOD PRESSURE: 111 MMHG | HEART RATE: 65 BPM | DIASTOLIC BLOOD PRESSURE: 76 MMHG

## 2025-05-21 DIAGNOSIS — R92.8 ABNORMAL ULTRASOUND OF BREAST: ICD-10-CM

## 2025-05-21 DIAGNOSIS — Z98.890 STATUS POST BREAST BIOPSY: ICD-10-CM

## 2025-05-21 PROCEDURE — 88305 TISSUE EXAM BY PATHOLOGIST: CPT | Performed by: STUDENT IN AN ORGANIZED HEALTH CARE EDUCATION/TRAINING PROGRAM

## 2025-05-21 PROCEDURE — 19083 BX BREAST 1ST LESION US IMAG: CPT

## 2025-05-21 PROCEDURE — 76642 ULTRASOUND BREAST LIMITED: CPT

## 2025-05-21 PROCEDURE — 88342 IMHCHEM/IMCYTCHM 1ST ANTB: CPT | Performed by: STUDENT IN AN ORGANIZED HEALTH CARE EDUCATION/TRAINING PROGRAM

## 2025-05-21 PROCEDURE — A4648 IMPLANTABLE TISSUE MARKER: HCPCS

## 2025-05-21 PROCEDURE — 88341 IMHCHEM/IMCYTCHM EA ADD ANTB: CPT | Performed by: STUDENT IN AN ORGANIZED HEALTH CARE EDUCATION/TRAINING PROGRAM

## 2025-05-21 RX ORDER — LIDOCAINE HYDROCHLORIDE 10 MG/ML
5 INJECTION, SOLUTION EPIDURAL; INFILTRATION; INTRACAUDAL; PERINEURAL ONCE
Status: COMPLETED | OUTPATIENT
Start: 2025-05-21 | End: 2025-05-21

## 2025-05-21 RX ADMIN — LIDOCAINE HYDROCHLORIDE 5 ML: 10 INJECTION, SOLUTION EPIDURAL; INFILTRATION; INTRACAUDAL; PERINEURAL at 14:09

## 2025-05-21 NOTE — PROGRESS NOTES
Procedure type:    __x___ultrasound guided _____stereotactic    Breast:    __x___Left _____Right    Location: 11:00 7 cmfn    Needle: 14g    # of passes: 4     Clip: 14g    Performed by: Dr Hurtado     Pressure held for 5 minutes by: Swati Quintero Strips:    ___X__yes _____no    Band aid:    __X___yes_____no    Tolerated procedure:    __X___yes _____no

## 2025-05-21 NOTE — PROGRESS NOTES
Met with patient and Dr. Tavera  regarding recommendation for;      _____ RIGHT __x1____LEFT      ___x__Ultrasound guided  ______Stereotactic  Breast biopsy.      __x___Verbalized understanding.      Blood thinners:  _____yes ___x__no    Date stopped: _____x______    Biopsy teaching sheet given:  _______yes __x____no  ADD ON BIOPSY; will receive AVS

## 2025-05-23 ENCOUNTER — TELEPHONE (OUTPATIENT)
Dept: MAMMOGRAPHY | Facility: CLINIC | Age: 45
End: 2025-05-23

## 2025-05-23 PROCEDURE — 88341 IMHCHEM/IMCYTCHM EA ADD ANTB: CPT | Performed by: STUDENT IN AN ORGANIZED HEALTH CARE EDUCATION/TRAINING PROGRAM

## 2025-05-23 PROCEDURE — 88305 TISSUE EXAM BY PATHOLOGIST: CPT | Performed by: STUDENT IN AN ORGANIZED HEALTH CARE EDUCATION/TRAINING PROGRAM

## 2025-05-23 PROCEDURE — 88342 IMHCHEM/IMCYTCHM 1ST ANTB: CPT | Performed by: STUDENT IN AN ORGANIZED HEALTH CARE EDUCATION/TRAINING PROGRAM

## 2025-05-27 DIAGNOSIS — Z91.89 AT HIGH RISK FOR BREAST CANCER: Primary | ICD-10-CM

## 2025-06-11 ENCOUNTER — TELEPHONE (OUTPATIENT)
Dept: NEUROSURGERY | Facility: CLINIC | Age: 45
End: 2025-06-11

## 2025-06-11 DIAGNOSIS — M54.16 LUMBAR RADICULOPATHY: ICD-10-CM

## 2025-06-11 DIAGNOSIS — M51.26 OTHER INTERVERTEBRAL DISC DISPLACEMENT, LUMBAR REGION: Primary | ICD-10-CM

## 2025-06-11 NOTE — TELEPHONE ENCOUNTER
Pt's  called and cancelled 06/25/2025 surgery because pt is inpt at a facility.  Pt's last MRI L-Spine was 02/09/2025.    In basket message sent to DKO: Does pt need updated imaging and follow up with you before rescheduling surgery to Sept?

## 2025-07-17 ENCOUNTER — TELEPHONE (OUTPATIENT)
Age: 45
End: 2025-07-17

## 2025-07-17 NOTE — TELEPHONE ENCOUNTER
José Galicia, patient's provider called to inquire about getting patient set up with TMS therapy. José would like a call back to discuss [phone]

## 2025-07-23 ENCOUNTER — TELEPHONE (OUTPATIENT)
Age: 45
End: 2025-07-23

## (undated) DEVICE — TELFA NON-ADHERENT ABSORBENT DRESSING: Brand: TELFA

## (undated) DEVICE — BETHLEHEM UNIVERSAL OUTPATIENT: Brand: CARDINAL HEALTH

## (undated) DEVICE — GLOVE SRG BIOGEL 7

## (undated) DEVICE — NEEDLE 18 G X 1 1/2 SAFETY

## (undated) DEVICE — TUBING SUCTION 5MM X 12 FT